# Patient Record
Sex: FEMALE | Race: BLACK OR AFRICAN AMERICAN | NOT HISPANIC OR LATINO | Employment: FULL TIME | URBAN - METROPOLITAN AREA
[De-identification: names, ages, dates, MRNs, and addresses within clinical notes are randomized per-mention and may not be internally consistent; named-entity substitution may affect disease eponyms.]

---

## 2017-01-03 ENCOUNTER — OFFICE VISIT (OUTPATIENT)
Dept: PULMONOLOGY | Facility: CLINIC | Age: 37
End: 2017-01-03
Payer: COMMERCIAL

## 2017-01-03 VITALS
WEIGHT: 156.94 LBS | OXYGEN SATURATION: 99 % | HEIGHT: 67 IN | SYSTOLIC BLOOD PRESSURE: 102 MMHG | RESPIRATION RATE: 18 BRPM | DIASTOLIC BLOOD PRESSURE: 64 MMHG | BODY MASS INDEX: 24.63 KG/M2 | HEART RATE: 79 BPM

## 2017-01-03 DIAGNOSIS — G47.33 OSA (OBSTRUCTIVE SLEEP APNEA): Primary | ICD-10-CM

## 2017-01-03 PROCEDURE — 99999 PR PBB SHADOW E&M-EST. PATIENT-LVL III: CPT | Mod: PBBFAC,,, | Performed by: NURSE PRACTITIONER

## 2017-01-03 PROCEDURE — 99213 OFFICE O/P EST LOW 20 MIN: CPT | Mod: S$GLB,,, | Performed by: NURSE PRACTITIONER

## 2017-01-03 PROCEDURE — 1159F MED LIST DOCD IN RCRD: CPT | Mod: S$GLB,,, | Performed by: NURSE PRACTITIONER

## 2017-01-03 NOTE — PROGRESS NOTES
"Subjective:      Patient ID: Shasta Rivera is a 36 y.o. female.    Chief Complaint: Sleep Apnea    HPI Comments: Patient presents for sleep apnea on autopap. She had problems with mask initially, then URI. She is motivated to wear pap. Breathing better at night with PAP.         Visit Vitals    /64    Pulse 79    Resp 18    Ht 5' 7" (1.702 m)    Wt 71.2 kg (156 lb 15.5 oz)    LMP 12/07/2016 (Approximate)    SpO2 99%    BMI 24.58 kg/m2     Body mass index is 24.58 kg/(m^2).    Review of Systems   Constitutional: Negative.    HENT: Negative.    Respiratory: Negative.    Cardiovascular: Negative.    Musculoskeletal: Negative.    Gastrointestinal: Negative.    Neurological: Negative.    Psychiatric/Behavioral: Negative.      Objective:      Physical Exam   Constitutional: She is oriented to person, place, and time. She appears well-developed and well-nourished.   HENT:   Head: Normocephalic and atraumatic.   Mouth/Throat: Oropharynx is clear and moist.   Neck: Normal range of motion. Neck supple.   Cardiovascular: Normal rate and regular rhythm.  Exam reveals no gallop.    No murmur heard.  Pulmonary/Chest: Effort normal and breath sounds normal.   Abdominal: Soft. She exhibits no mass.   Musculoskeletal: Normal range of motion. She exhibits no edema.   Neurological: She is alert and oriented to person, place, and time.   Skin: Skin is warm and dry.   Psychiatric: She has a normal mood and affect.     Personal Diagnostic Review  Autopap download: Patient wears on average 5 hrs and 10 minutes. Greater than 4 hrs 39 % of the time. 90% percentile pressure 7 with AHI 2.2 events/hr      Assessment:       1. SAMIRA (obstructive sleep apnea)        Outpatient Encounter Prescriptions as of 1/3/2017   Medication Sig Dispense Refill    cetirizine (ZYRTEC) 10 MG tablet TAKE ONE TABLET BY MOUTH ONE TIME DAILY  30 tablet 9    hydrocodone-acetaminophen 10-325mg (NORCO)  mg Tab TAKE 1 TABLET BY MOUTH TWICE A " DAY AS NEEDED FOR 30 DAYS  0    SUDAFED 12 HOUR 120 mg 12 hr tablet TAKE 1 TABLET BY MOUTH TWICE DAILY 60 tablet 5    terbinafine HCl (LAMISIL) 250 mg tablet Take 1 tablet (250 mg total) by mouth once daily. 1 pill by mouth x 90 days. Avoid alcohol intake while taking medication 90 tablet 0    zolpidem (AMBIEN) 5 MG Tab TAKE 1 TO 2 TABLETS BY MOUTH NIGHTLY AT BEDTIME AS NEEDED 30 tablet 3     No facility-administered encounter medications on file as of 1/3/2017.      No orders of the defined types were placed in this encounter.    Plan:      Follow up in 6 weeks to review compliance and symptoms or call earlier if any problems

## 2017-02-09 ENCOUNTER — LAB VISIT (OUTPATIENT)
Dept: LAB | Facility: HOSPITAL | Age: 37
End: 2017-02-09
Attending: PODIATRIST
Payer: COMMERCIAL

## 2017-02-09 ENCOUNTER — OFFICE VISIT (OUTPATIENT)
Dept: PODIATRY | Facility: CLINIC | Age: 37
End: 2017-02-09
Payer: COMMERCIAL

## 2017-02-09 VITALS
BODY MASS INDEX: 23.88 KG/M2 | DIASTOLIC BLOOD PRESSURE: 75 MMHG | HEART RATE: 82 BPM | WEIGHT: 152.13 LBS | SYSTOLIC BLOOD PRESSURE: 112 MMHG | HEIGHT: 67 IN

## 2017-02-09 DIAGNOSIS — B35.1 DERMATOPHYTOSIS OF NAIL: Primary | ICD-10-CM

## 2017-02-09 DIAGNOSIS — B35.1 DERMATOPHYTOSIS OF NAIL: ICD-10-CM

## 2017-02-09 LAB
ALBUMIN SERPL BCP-MCNC: 3.9 G/DL
ALP SERPL-CCNC: 57 U/L
ALT SERPL W/O P-5'-P-CCNC: 12 U/L
AST SERPL-CCNC: 20 U/L
BILIRUB DIRECT SERPL-MCNC: 0.2 MG/DL
BILIRUB SERPL-MCNC: 0.5 MG/DL
PROT SERPL-MCNC: 7.8 G/DL

## 2017-02-09 PROCEDURE — 99213 OFFICE O/P EST LOW 20 MIN: CPT | Mod: S$GLB,,, | Performed by: PODIATRIST

## 2017-02-09 PROCEDURE — 99999 PR PBB SHADOW E&M-EST. PATIENT-LVL III: CPT | Mod: PBBFAC,,, | Performed by: PODIATRIST

## 2017-02-09 NOTE — PROGRESS NOTES
"Podiatry Note  Requesting Consult Provider:   PCP: Dr. Mary Redmond MD    CHIEF COMPLAINT   Chief Complaint   Patient presents with    Follow-up     bilateral hallux fungal nails, pt states fungus improvement with Lamisil treatment         HPI:    Shasta Rivera is a 36 y.o. female presenting to podiatry clinic for fu fungal toenails. Pt states she has started lamisil and has noted improvement in nail discoloration. She is pleased with new results. She has no complaints with taking oral medication.        REVIEW OF SYSTEMS  General: Denies any fever or chills  Chest: Denies shortness of breath, wheezing, coughing, or sputum production  Heart: Denies chest pain, cold extremities, orthopenia, or reduced exercise tolerance  As noted above and per history of current illness above, otherwise negative in the remainder of the 14 systems.      PHYSICAL EXAM:      Vitals:    02/09/17 1015   BP: 112/75   Pulse: 82   Weight: 69 kg (152 lb 1.9 oz)   Height: 5' 7" (1.702 m)       General: This patient is well-developed, well-nourished and appears stated age, well-oriented to person, place and time, and cooperative and pleasant on today's visit      LOWER EXTREMITY  Vascular:   · Palpable DP/PT pulses b/l  · Skin temperature warm to warm from prox to distally  · CFT <5 secs b/l  · There is no edema noted b/l    Dermatologic:   · Nails 1-5 with clean nail plate, no evidence of mycosis   · No erythema or drainage noted b/l  · Webspaces are C/D/I B/L  · There is no hyperkeratotic tissue noted.   · Skin texture and turgor WNL    Neurologic:  · epicritic sensation grossly intact b/l   · Light touch and sharp/dull sensation intact b/l  · Achilles and patellar deep tendon reflexes intact  · Babinski reflex absent b/l    Musculoskeletal/Orthopedic:  · No symptomatic structural abnormalities noted.   · Muscle strength AT/EHL/EDL/PT: 5/5; Achilles/Gastroc/Soleus: 5/5; PB/PL: 5/5 Muscle tone is normal.  · Ankle joint ROM  B/L " supple DF/PF, non-crepitus  · STJ ROM supple inv/ev, non crepitus   · MTPJ b/l supple DF/PF, non crepitus    ASSESSMENT   1. Onychomycosis due to dermatophyte     PLAN    1. Patient was educated about clinical and imaging findings, and verbalizes understanding of above.  2. I Discussed treatment options for nail fungus.   -continue with oral lamisil   3. RTC PRN     Report Electronically Signed By:  Beba Dick DPM   Podiatric Medicine & Surgery  [facility]  2/9/2017

## 2017-02-14 ENCOUNTER — TELEPHONE (OUTPATIENT)
Dept: PULMONOLOGY | Facility: CLINIC | Age: 37
End: 2017-02-14

## 2017-02-15 ENCOUNTER — OFFICE VISIT (OUTPATIENT)
Dept: SLEEP MEDICINE | Facility: CLINIC | Age: 37
End: 2017-02-15
Payer: COMMERCIAL

## 2017-02-15 VITALS
WEIGHT: 151 LBS | SYSTOLIC BLOOD PRESSURE: 102 MMHG | DIASTOLIC BLOOD PRESSURE: 76 MMHG | OXYGEN SATURATION: 99 % | HEART RATE: 88 BPM | HEIGHT: 67 IN | BODY MASS INDEX: 23.7 KG/M2 | RESPIRATION RATE: 18 BRPM

## 2017-02-15 DIAGNOSIS — Z72.820 LACK OF ADEQUATE SLEEP: ICD-10-CM

## 2017-02-15 DIAGNOSIS — G47.33 OSA (OBSTRUCTIVE SLEEP APNEA): Primary | ICD-10-CM

## 2017-02-15 PROCEDURE — 99213 OFFICE O/P EST LOW 20 MIN: CPT | Mod: S$GLB,,, | Performed by: NURSE PRACTITIONER

## 2017-02-15 PROCEDURE — 99999 PR PBB SHADOW E&M-EST. PATIENT-LVL III: CPT | Mod: PBBFAC,,, | Performed by: NURSE PRACTITIONER

## 2017-02-15 NOTE — PROGRESS NOTES
"Subjective:      Patient ID: Shasta Rivera is a 36 y.o. female.    Chief Complaint: Sleep Apnea    HPI Comments: Patient presents to the office today for evaluation of sleep apnea.  She is wondering CPAP nightly, but still has daytime hypersomnolence.  She is sleeping more soundly.  She admits to approximately 5 hours of sleep per night and watching TV in bed.  [place] Sleepiness Scale score 14      Visit Vitals    /76    Pulse 88    Resp 18    Ht 5' 7" (1.702 m)    Wt 68.5 kg (151 lb 0.2 oz)    LMP 02/09/2017    SpO2 99%    BMI 23.65 kg/m2     Body mass index is 23.65 kg/(m^2).    Review of Systems   Constitutional: Positive for fatigue.   HENT: Negative.    Respiratory: Negative.    Cardiovascular: Negative.    Musculoskeletal: Negative.    Gastrointestinal: Negative.    Neurological: Negative.    Psychiatric/Behavioral: Positive for sleep disturbance.     Objective:      Physical Exam   Constitutional: She is oriented to person, place, and time. She appears well-developed and well-nourished.   HENT:   Head: Normocephalic and atraumatic.   Mouth/Throat: Oropharynx is clear and moist.   Neck: Normal range of motion. Neck supple.   Cardiovascular: Exam reveals no gallop.    No murmur heard.  Abdominal: She exhibits no mass. There is no tenderness.   Musculoskeletal: She exhibits no edema.   Neurological: She is alert and oriented to person, place, and time.   Psychiatric: She has a normal mood and affect.     Personal Diagnostic Review  Autopap download: Patient wears on average 5 hrs and 43 minutes. Greater than 4 hrs 93.3 % of the time. 90% percentile pressure 7 with AHI 1.9 events/hr      Assessment:       1. SAMIRA (obstructive sleep apnea)    2. Lack of adequate sleep        Outpatient Encounter Prescriptions as of 2/15/2017   Medication Sig Dispense Refill    cetirizine (ZYRTEC) 10 MG tablet TAKE ONE TABLET BY MOUTH ONE TIME DAILY  30 tablet 9    hydrocodone-acetaminophen 10-325mg (NORCO) "  mg Tab TAKE 1 TABLET BY MOUTH TWICE A DAY AS NEEDED FOR 30 DAYS  0    SUDAFED 12 HOUR 120 mg 12 hr tablet TAKE 1 TABLET BY MOUTH TWICE DAILY 60 tablet 5    terbinafine HCl (LAMISIL) 250 mg tablet Take 1 tablet (250 mg total) by mouth once daily. 1 pill by mouth x 90 days. Avoid alcohol intake while taking medication 90 tablet 0    zolpidem (AMBIEN) 5 MG Tab TAKE 1 TO 2 TABLETS BY MOUTH NIGHTLY AT BEDTIME AS NEEDED 30 tablet 3     No facility-administered encounter medications on file as of 2/15/2017.      No orders of the defined types were placed in this encounter.    Plan:   Doing well on PAP settings. Patient is compliant. Obtain 8hrs of sleep.  Follow up in 6 months with PAP data download or call earlier if any problems.    Total time spent in face to face counseling and coordination of care -  20minutes over 50% of time was used in discussion of prognosis, risks, benefits of treatment, instructions and compliance with regimen .

## 2017-02-27 ENCOUNTER — OFFICE VISIT (OUTPATIENT)
Dept: FAMILY MEDICINE | Facility: CLINIC | Age: 37
End: 2017-02-27
Payer: COMMERCIAL

## 2017-02-27 VITALS
WEIGHT: 151 LBS | HEIGHT: 66 IN | TEMPERATURE: 97 F | OXYGEN SATURATION: 99 % | RESPIRATION RATE: 18 BRPM | HEART RATE: 89 BPM | SYSTOLIC BLOOD PRESSURE: 118 MMHG | BODY MASS INDEX: 24.27 KG/M2 | DIASTOLIC BLOOD PRESSURE: 64 MMHG

## 2017-02-27 DIAGNOSIS — Z12.4 CERVICAL CANCER SCREENING: ICD-10-CM

## 2017-02-27 DIAGNOSIS — D25.9 UTERINE LEIOMYOMA, UNSPECIFIED LOCATION: ICD-10-CM

## 2017-02-27 DIAGNOSIS — J30.89 NON-SEASONAL ALLERGIC RHINITIS DUE TO OTHER ALLERGIC TRIGGER: ICD-10-CM

## 2017-02-27 DIAGNOSIS — Z00.00 PREVENTATIVE HEALTH CARE: Primary | ICD-10-CM

## 2017-02-27 PROCEDURE — 99999 PR PBB SHADOW E&M-EST. PATIENT-LVL III: CPT | Mod: PBBFAC,,, | Performed by: FAMILY MEDICINE

## 2017-02-27 PROCEDURE — 99395 PREV VISIT EST AGE 18-39: CPT | Mod: S$GLB,,, | Performed by: FAMILY MEDICINE

## 2017-02-27 PROCEDURE — 88175 CYTOPATH C/V AUTO FLUID REDO: CPT

## 2017-02-27 RX ORDER — ZOLPIDEM TARTRATE 5 MG/1
TABLET ORAL
Qty: 30 TABLET | Refills: 5 | Status: SHIPPED | OUTPATIENT
Start: 2017-02-27 | End: 2017-08-27 | Stop reason: SDUPTHER

## 2017-02-27 RX ORDER — CETIRIZINE HYDROCHLORIDE 10 MG/1
10 TABLET ORAL DAILY
Qty: 30 TABLET | Refills: 9 | Status: SHIPPED | OUTPATIENT
Start: 2017-02-27 | End: 2017-04-06 | Stop reason: SDUPTHER

## 2017-02-27 RX ORDER — PSEUDOEPHEDRINE HCL 120 MG/1
120 TABLET, FILM COATED, EXTENDED RELEASE ORAL 2 TIMES DAILY
Qty: 60 TABLET | Refills: 5 | Status: SHIPPED | OUTPATIENT
Start: 2017-02-27 | End: 2017-09-24 | Stop reason: SDUPTHER

## 2017-02-27 NOTE — PROGRESS NOTES
CHIEF COMPLAINT: This is a 36-year-old female here for preventive health exam.    SUBJECTIVE: The patient reports that she was diagnosed with mild SAMIRA and has been using CPAP with no improvement.  Patient admits that she does not sleep more than 4-6 hours a night, however.  She is working full-time and going to grad school.  She's gained 8 pounds in the last year.  She is no longer taking OCP.  Her menstrual cycles are regular.  Patient complains of vivid dreams/nightmares with use of 5 mg of Ambien for insomnia.  She often takes half a tablet.  Patient is being treated for onychomycosis with terbinafine.  She requests refill of pseudoephedrine and Zyrtec for allergic rhinitis.    Eye exam 2016. Pap smear October 2014. Tdap September 2012. Flu vaccine October 2016.     ROS:  GENERAL: Patient denies fever, chills, night sweats. Patient denies weight loss. Patient denies anorexia, fatigue, weakness or swollen glands.  SKIN: Patient denies rash or hair loss.  HEENT: Patient denies sore throat, ear pain, hearing loss, nasal congestion, or runny nose. Patient denies visual disturbance, eye irritation or discharge.  LUNGS: Patient denies cough, wheeze or hemoptysis.  CARDIOVASCULAR: Patient denies chest pain, shortness of breath, palpitations, syncope or lower extremity edema.  GI: Patient denies abdominal pain, nausea, vomiting, diarrhea, constipation, blood in stool or melena.  GENITOURINARY: Patient denies pelvic pain, vaginal discharge, itch or odor. Patient denies irregular vaginal bleeding. Patient denies dysuria, frequency, hematuria, nocturia, urgency or incontinence.  BREASTS: Patient denies breast pain, mass or nipple discharge.  MUSCULOSKELETAL: Patient denies joint pain, swelling, redness or warmth.  NEUROLOGIC: Patient denies headache, vertigo, paresthesias, weakness in limb, dysarthria, dysphagia or abnormality of gait.  PSYCHIATRIC: Patient denies anxiety, depression, or memory loss. Positive for  insomnia.     OBJECTIVE:   GENERAL: Well-developed well-nourished black female alert and oriented x3, in no acute distress. Memory, judgment and cognition without deficit. Weight gain of a pounds in the last year.  SKIN: Clear without rash. Normal color and tone.  HEENT: Eyes: Clear conjunctivae. Pupils equal reactive to light and accommodation. Ears: Clear TMs. Clear canals. Nose: Without congestion. Pharynx: Without injection or exudates.  NECK: Supple, normal range of motion. No masses, lymphadenopathy or enlarged thyroid. No JVD. Carotids 2+ and equal. No bruits.  LUNGS: Clear to auscultation. Normal respiratory effort.  CARDIOVASCULAR: Regular rhythm, normal S1, S2 without murmur, gallop or rub.  BACK: No CVA or spinal tenderness.  BREASTS: No masses, tenderness or nipple discharge.  ABDOMEN: Normal appearance. Active bowel sounds. Soft, nontender without mass or organomegaly. No rebound or guarding.  EXTREMITIES: Without cyanosis, clubbing or edema. Distal pulses 2+ and equal. Normal range of motion in all extremities. No joint effusion, erythema or warmth.  NEUROLOGIC: Cranial nerves II through XII without deficit. Motor strength equal bilaterally. Sensation normal to touch. Deep tendon reflexes 2+ and equal. Gait without abnormality. No tremor. Negative cerebellar signs.   PELVIC: External: Without lesions or inflammation.  Vaginal: Clear.  Cervix: Small nabothian cyst, nontender.  Pap X2.  Uterus: Enlarged and retroflexed.  Adnexa: Non-tender, without masses.      ASSESSMENT:  1. Preventative health care    2. Non-seasonal allergic rhinitis due to other allergic trigger    3. Uterine leiomyoma, unspecified location    4. Cervical cancer screening      PLAN:   1.  Weight reduction.  Exercise regularly.  2.  Age-appropriate counseling.  3.  Refill medication.  4.  Follow-up annually.

## 2017-03-27 RX ORDER — TERBINAFINE HYDROCHLORIDE 250 MG/1
TABLET ORAL
Qty: 90 TABLET | Refills: 0 | Status: SHIPPED | OUTPATIENT
Start: 2017-03-27 | End: 2017-04-03 | Stop reason: ALTCHOICE

## 2017-03-29 ENCOUNTER — PATIENT MESSAGE (OUTPATIENT)
Dept: PODIATRY | Facility: CLINIC | Age: 37
End: 2017-03-29

## 2017-04-03 ENCOUNTER — LAB VISIT (OUTPATIENT)
Dept: LAB | Facility: HOSPITAL | Age: 37
End: 2017-04-03
Attending: PODIATRIST
Payer: COMMERCIAL

## 2017-04-03 ENCOUNTER — OFFICE VISIT (OUTPATIENT)
Dept: PODIATRY | Facility: CLINIC | Age: 37
End: 2017-04-03
Payer: COMMERCIAL

## 2017-04-03 ENCOUNTER — TELEPHONE (OUTPATIENT)
Dept: PODIATRY | Facility: CLINIC | Age: 37
End: 2017-04-03

## 2017-04-03 VITALS
DIASTOLIC BLOOD PRESSURE: 69 MMHG | HEART RATE: 97 BPM | BODY MASS INDEX: 23.74 KG/M2 | WEIGHT: 147.69 LBS | HEIGHT: 66 IN | SYSTOLIC BLOOD PRESSURE: 112 MMHG

## 2017-04-03 DIAGNOSIS — B35.1 DERMATOPHYTOSIS OF NAIL: ICD-10-CM

## 2017-04-03 DIAGNOSIS — B35.1 DERMATOPHYTOSIS OF NAIL: Primary | ICD-10-CM

## 2017-04-03 LAB
ALBUMIN SERPL BCP-MCNC: 3.9 G/DL
ALP SERPL-CCNC: 75 U/L
ALT SERPL W/O P-5'-P-CCNC: 16 U/L
AST SERPL-CCNC: 23 U/L
BILIRUB DIRECT SERPL-MCNC: 0.1 MG/DL
BILIRUB SERPL-MCNC: 0.2 MG/DL
PROT SERPL-MCNC: 8 G/DL

## 2017-04-03 PROCEDURE — 36415 COLL VENOUS BLD VENIPUNCTURE: CPT | Mod: PO

## 2017-04-03 PROCEDURE — 99999 PR PBB SHADOW E&M-EST. PATIENT-LVL III: CPT | Mod: PBBFAC,,, | Performed by: PODIATRIST

## 2017-04-03 PROCEDURE — 80076 HEPATIC FUNCTION PANEL: CPT

## 2017-04-03 PROCEDURE — 1160F RVW MEDS BY RX/DR IN RCRD: CPT | Mod: S$GLB,,, | Performed by: PODIATRIST

## 2017-04-03 PROCEDURE — 99213 OFFICE O/P EST LOW 20 MIN: CPT | Mod: S$GLB,,, | Performed by: PODIATRIST

## 2017-04-03 RX ORDER — TERBINAFINE HYDROCHLORIDE 250 MG/1
250 TABLET ORAL DAILY
Qty: 90 TABLET | Refills: 0 | Status: SHIPPED | OUTPATIENT
Start: 2017-04-03 | End: 2018-01-24

## 2017-04-03 NOTE — TELEPHONE ENCOUNTER
----- Message from Judith Burks sent at 4/3/2017 11:37 AM CDT -----  Contact: Carmencita with [facility]  [phone]  Checking on the status of a Prior Auth for Terbinafine requested on 3/27.  Please call ASAP.

## 2017-04-03 NOTE — MR AVS SNAPSHOT
[facility]  [address]  [place] [address]  Phone: [phone]  Fax: [phone]                  Shasta Rivera   4/3/2017 3:00 PM   Office Visit    Description:  Female : 1980   Provider:  Beba Dick DPM   Department:  [facility]           Reason for Visit     Follow-up           Diagnoses this Visit        Comments    Dermatophytosis of nail    -  Primary            To Do List           Future Appointments        Provider Department Dept Phone    4/3/2017 6:45 PM LAB, SAME DAY [facility] [phone]    2017 3:00 PM Beab Dick DPM [facility] [phone]    2017 3:25 PM LABORATORY, [facility] [phone]    2017 8:40 AM Elizabeth Lejeune, NP [facility] [phone]      Goals (5 Years of Data)     None       These Medications        Disp Refills Start End    terbinafine HCl (LAMISIL) 250 mg tablet 90 tablet 0 4/3/2017     Take 1 tablet (250 mg total) by mouth once daily. 1 pill by mouth x 90 days. Avoid alcohol intake while taking medication - Oral    Pharmacy: [place] [id number] [place] [facility]  [facility] #: [phone]         [facility]     [facility] Care Line -  Assistance  Unless otherwise directed by your provider, please contact [facility], our nurse care line that is available for  assistance.     Registered nurses in the [facility] provide: appointment scheduling, clinical advisement, health education, and other advisory services.  Call: [phone] (toll free)               Medications           Message regarding Medications     Verify the changes and/or additions to your medication regime listed below are the same as discussed with your clinician today.  If any of these changes or additions are incorrect, please notify your healthcare provider.        START taking these NEW medications        Refills  "   terbinafine HCl (LAMISIL) 250 mg tablet 0    Sig: Take 1 tablet (250 mg total) by mouth once daily. 1 pill by mouth x 90 days. Avoid alcohol intake while taking medication    Class: Normal    Route: Oral           Verify that the below list of medications is an accurate representation of the medications you are currently taking.  If none reported, the list may be blank. If incorrect, please contact your healthcare provider. Carry this list with you in case of emergency.           Current Medications     cetirizine (ZYRTEC) 10 MG tablet Take 1 tablet (10 mg total) by mouth once daily.    hydrocodone-acetaminophen 10-325mg (NORCO)  mg Tab TAKE 1 TABLET BY MOUTH TWICE A DAY AS NEEDED FOR 30 DAYS    zolpidem (AMBIEN) 5 MG Tab TAKE 1 TO 2 TABLETS BY MOUTH NIGHTLY AT BEDTIME AS NEEDED    pseudoephedrine (SUDAFED 12 HOUR) 120 mg 12 hr tablet Take 1 tablet (120 mg total) by mouth 2 (two) times daily.    terbinafine HCl (LAMISIL) 250 mg tablet Take 1 tablet (250 mg total) by mouth once daily. 1 pill by mouth x 90 days. Avoid alcohol intake while taking medication           Clinical Reference Information           Your Vitals Were     BP Pulse Height Weight BMI    112/69 (BP Location: Right arm, Patient Position: Sitting, BP Method: Automatic) 97 5' 6" (1.676 m) 67 kg (147 lb 11.3 oz) 23.84 kg/m2      Blood Pressure          Most Recent Value    BP  112/69      Allergies as of 4/3/2017     No Known Allergies      Immunizations Administered on Date of Encounter - 4/3/2017     None      Orders Placed During Today's Visit     Future Labs/Procedures Expected by Expires    Hepatic function panel  4/3/2017 6/2/2018    Hepatic function panel  4/3/2017 6/2/2018      Language Assistance Services     ATTENTION: Language assistance services are available, free of charge. Please call [phone].      ATENCIÓN: Si habla hazel, tiene a valdez disposición servicios gratuitos de asistencia lingüística. Llame al " [phone].     JACQUES Ý: N?u b?n nói Ti?ng Vi?t, có các d?ch v? h? tr? ngôn ng? mi?n phí dành cho b?n. G?i s? [phone].         [facility] complies with applicable Federal civil rights laws and does not discriminate on the basis of race, color, national origin, age, disability, or sex.

## 2017-04-03 NOTE — TELEPHONE ENCOUNTER
Returned pharmacy call there was no answer no voicemail option given.  Amie Núñez MA  Office of [facility]   Podiatry Department, [facility]

## 2017-04-03 NOTE — PROGRESS NOTES
"Podiatry Note    CHIEF COMPLAINT   Chief Complaint   Patient presents with    Follow-up     6 wk f/u for fungal nails. Verbalizes relief. Interested in alternative treatment         HPI:    Shasta Rivera is a 37 y.o. female presenting to podiatry clinic for fu fungal toenails. Pt states she has started lamisil and has noted improvement in nail discoloration. She is pleased with new results. She would like to continue therapy as there is a mild amount of residual discoloration b/l hallux.      REVIEW OF SYSTEMS  General: Denies any fever or chills  Chest: Denies shortness of breath, wheezing, coughing, or sputum production  Heart: Denies chest pain, cold extremities, orthopenia, or reduced exercise tolerance  As noted above and per history of current illness above, otherwise negative in the remainder of the 14 systems.      PHYSICAL EXAM:      Vitals:    04/03/17 1510   BP: 112/69   Pulse: 97   Weight: 67 kg (147 lb 11.3 oz)   Height: 5' 6" (1.676 m)   PainSc: 0-No pain       General: This patient is well-developed, well-nourished and appears stated age, well-oriented to person, place and time, and cooperative and pleasant on today's visit      LOWER EXTREMITY  Vascular:   · Palpable DP/PT pulses b/l  · Skin temperature warm to warm from prox to distally  · CFT <5 secs b/l  · There is no edema noted b/l    Dermatologic:   · Nails 1-5 with clean nail plate, distal discolration noted b/l hallux  · No erythema or drainage noted b/l  · Webspaces are C/D/I B/L  · There is no hyperkeratotic tissue noted.   · Skin texture and turgor WNL    Neurologic:  · epicritic sensation grossly intact b/l   · Light touch and sharp/dull sensation intact b/l  · Achilles and patellar deep tendon reflexes intact  · Babinski reflex absent b/l    Musculoskeletal/Orthopedic:  · No symptomatic structural abnormalities noted.   · Muscle strength AT/EHL/EDL/PT: 5/5; Achilles/Gastroc/Soleus: 5/5; PB/PL: 5/5 Muscle tone is normal.  · Ankle " joint ROM  B/L supple DF/PF, non-crepitus  · STJ ROM supple inv/ev, non crepitus   · MTPJ b/l supple DF/PF, non crepitus    ASSESSMENT   1. Onychomycosis due to dermatophyte     PLAN    1. Patient was educated about clinical and imaging findings, and verbalizes understanding of above.  2. I Discussed treatment options for nail fungus.   -continue with oral lamisil   -new LFTs baseline  3. RTC 6 wk follow up    Report Electronically Signed By:  Beba Dick DPM   Podiatric Medicine & Surgery  [facility]  4/3/2017

## 2017-04-05 ENCOUNTER — TELEPHONE (OUTPATIENT)
Dept: PODIATRY | Facility: CLINIC | Age: 37
End: 2017-04-05

## 2017-04-05 NOTE — TELEPHONE ENCOUNTER
Attempted to contact patient in reference to lab results reviewed by Dr. Dick, IAMM being normal and to inform patient that prescription medication is not covered by insurance so she should contact insurance to see what information is needed to cover medication. Unable to leave voicemail.

## 2017-04-06 RX ORDER — CETIRIZINE HYDROCHLORIDE 10 MG/1
10 TABLET ORAL DAILY
Qty: 30 TABLET | Refills: 5 | Status: SHIPPED | OUTPATIENT
Start: 2017-04-06 | End: 2023-05-23

## 2017-04-07 ENCOUNTER — OFFICE VISIT (OUTPATIENT)
Dept: OTOLARYNGOLOGY | Facility: CLINIC | Age: 37
End: 2017-04-07
Payer: COMMERCIAL

## 2017-04-07 VITALS
HEART RATE: 105 BPM | DIASTOLIC BLOOD PRESSURE: 79 MMHG | BODY MASS INDEX: 23.98 KG/M2 | WEIGHT: 148.56 LBS | SYSTOLIC BLOOD PRESSURE: 119 MMHG

## 2017-04-07 DIAGNOSIS — J06.9 UPPER RESPIRATORY TRACT INFECTION, UNSPECIFIED TYPE: Primary | ICD-10-CM

## 2017-04-07 DIAGNOSIS — J30.9 ALLERGIC RHINITIS, UNSPECIFIED ALLERGIC RHINITIS TRIGGER, UNSPECIFIED RHINITIS SEASONALITY: ICD-10-CM

## 2017-04-07 PROCEDURE — 1160F RVW MEDS BY RX/DR IN RCRD: CPT | Mod: S$GLB,,, | Performed by: PHYSICIAN ASSISTANT

## 2017-04-07 PROCEDURE — 99999 PR PBB SHADOW E&M-EST. PATIENT-LVL III: CPT | Mod: PBBFAC,,, | Performed by: PHYSICIAN ASSISTANT

## 2017-04-07 PROCEDURE — 99204 OFFICE O/P NEW MOD 45 MIN: CPT | Mod: S$GLB,,, | Performed by: PHYSICIAN ASSISTANT

## 2017-04-07 RX ORDER — FLUTICASONE PROPIONATE 50 MCG
2 SPRAY, SUSPENSION (ML) NASAL DAILY
Qty: 1 BOTTLE | Refills: 12 | Status: SHIPPED | OUTPATIENT
Start: 2017-04-07 | End: 2020-05-04

## 2017-04-07 RX ORDER — AMOXICILLIN AND CLAVULANATE POTASSIUM 875; 125 MG/1; MG/1
1 TABLET, FILM COATED ORAL 2 TIMES DAILY
Qty: 20 TABLET | Refills: 0 | Status: SHIPPED | OUTPATIENT
Start: 2017-04-07 | End: 2017-04-17

## 2017-04-07 RX ORDER — BENZONATATE 100 MG/1
100 CAPSULE ORAL 3 TIMES DAILY PRN
Qty: 20 CAPSULE | Refills: 0 | Status: SHIPPED | OUTPATIENT
Start: 2017-04-07 | End: 2017-04-17

## 2017-04-07 NOTE — PROGRESS NOTES
Subjective:       Patient ID: Shasta Rivera is a 37 y.o. female.    Chief Complaint: Sinus Problem and Cough    HPI Comments: Patient is a very pleasant 37 year old female here to see me today for the first time for evaluation of sinus issues.  She has history of allergic rhinitis and typically takes Zyrtec and Sudafed with good control.  She's had worsening nasal congestion, thick yellow nasal drainage and postnasal drip for the past 10 days.  She had low-grade fever 3 days ago.  She's coughing up thick discolored mucus; seems worse at night.  She says her chest is sore from coughing so much.  She has sore throat but denies dysphagia.  Denies ear pain or drainage.  She is not currently using any nasal sprays.    Review of Systems   Constitutional: Positive for fever (low-grade). Negative for activity change, appetite change and unexpected weight change.   HENT: Positive for congestion, postnasal drip, rhinorrhea and sore throat. Negative for ear discharge, ear pain, hearing loss, nosebleeds, sinus pressure, tinnitus and trouble swallowing.    Eyes: Negative for discharge and itching.   Respiratory: Positive for cough. Negative for shortness of breath and wheezing.         Has SAMIRA and wears CPAP   Cardiovascular: Negative for chest pain and palpitations.   Gastrointestinal: Negative for diarrhea, nausea and vomiting.   Musculoskeletal: Positive for back pain. Negative for arthralgias and neck pain.   Allergic/Immunologic: Positive for environmental allergies. Negative for food allergies.   Neurological: Positive for light-headedness and headaches. Negative for dizziness.   Hematological: Negative for adenopathy.   Psychiatric/Behavioral: Positive for sleep disturbance (up at night with cough).       Objective:      Physical Exam   Constitutional: She is oriented to person, place, and time. She appears well-developed and well-nourished. No distress.   HENT:   Head: Normocephalic and atraumatic.   Right Ear:  Hearing, tympanic membrane, external ear and ear canal normal. No middle ear effusion.   Left Ear: Hearing, tympanic membrane, external ear and ear canal normal.  No middle ear effusion.   Nose: Mucosal edema and rhinorrhea present. No nasal deformity or septal deviation. No epistaxis. Right sinus exhibits no maxillary sinus tenderness and no frontal sinus tenderness. Left sinus exhibits no maxillary sinus tenderness and no frontal sinus tenderness.   Mouth/Throat: Uvula is midline, oropharynx is clear and moist and mucous membranes are normal. Mucous membranes are not pale and not dry. No trismus in the jaw. Normal dentition. No oropharyngeal exudate or posterior oropharyngeal erythema. Tonsils are 2+ on the right. Tonsils are 2+ on the left. No tonsillar exudate.   Eyes: Conjunctivae, EOM and lids are normal. Pupils are equal, round, and reactive to light. Right eye exhibits no chemosis. Left eye exhibits no chemosis. Right conjunctiva is not injected. Left conjunctiva is not injected. No scleral icterus. Right eye exhibits normal extraocular motion and no nystagmus. Left eye exhibits normal extraocular motion and no nystagmus.   Neck: Trachea normal and phonation normal. No tracheal tenderness present. No tracheal deviation present. No thyroid mass and no thyromegaly present.   Cardiovascular: Intact distal pulses.    Pulmonary/Chest: Effort normal. No stridor. No respiratory distress.   Abdominal: She exhibits no distension.   Lymphadenopathy:        Head (right side): No submental, no submandibular, no preauricular, no posterior auricular and no occipital adenopathy present.        Head (left side): No submental, no submandibular, no preauricular, no posterior auricular and no occipital adenopathy present.     She has no cervical adenopathy.   Neurological: She is alert and oriented to person, place, and time. No cranial nerve deficit.   Skin: Skin is warm and dry. No rash noted. No erythema.   Psychiatric: She  has a normal mood and affect. Her behavior is normal.       Assessment:       1. Upper respiratory tract infection, unspecified type    2. Allergic rhinitis, unspecified allergic rhinitis trigger, unspecified rhinitis seasonality        Plan:         She's now had 10 days of symptoms and is worsening.  Will treat with Augmentin x 10 days.  Recommend Flonase.   The patient was given a prescription for a steroid nasal spray, and we discussed in detail the proper mechanism of use directing the spray away from the nasal septum.  In addition, we also discussed that it will take two to three weeks of daily use to achieve maximal effectiveness.  Also discussed using frequent nasal saline spray; plain Mucinex, and good hydration.  She requested something for cough; will add Tessalon as needed.  Return to clinic only as needed.

## 2017-04-14 ENCOUNTER — PATIENT MESSAGE (OUTPATIENT)
Dept: PODIATRY | Facility: CLINIC | Age: 37
End: 2017-04-14

## 2017-05-31 ENCOUNTER — OFFICE VISIT (OUTPATIENT)
Dept: PODIATRY | Facility: CLINIC | Age: 37
End: 2017-05-31
Payer: COMMERCIAL

## 2017-05-31 ENCOUNTER — LAB VISIT (OUTPATIENT)
Dept: LAB | Facility: HOSPITAL | Age: 37
End: 2017-05-31
Attending: PODIATRIST
Payer: COMMERCIAL

## 2017-05-31 VITALS
HEART RATE: 75 BPM | DIASTOLIC BLOOD PRESSURE: 71 MMHG | SYSTOLIC BLOOD PRESSURE: 106 MMHG | HEIGHT: 66 IN | BODY MASS INDEX: 23.88 KG/M2 | WEIGHT: 148.56 LBS

## 2017-05-31 DIAGNOSIS — B35.1 DERMATOPHYTOSIS OF NAIL: Primary | ICD-10-CM

## 2017-05-31 DIAGNOSIS — B35.1 DERMATOPHYTOSIS OF NAIL: ICD-10-CM

## 2017-05-31 LAB
ALBUMIN SERPL BCP-MCNC: 3.9 G/DL
ALP SERPL-CCNC: 62 U/L
ALT SERPL W/O P-5'-P-CCNC: 15 U/L
AST SERPL-CCNC: 22 U/L
BILIRUB DIRECT SERPL-MCNC: 0.1 MG/DL
BILIRUB SERPL-MCNC: 0.3 MG/DL
PROT SERPL-MCNC: 7.9 G/DL

## 2017-05-31 PROCEDURE — 80076 HEPATIC FUNCTION PANEL: CPT

## 2017-05-31 PROCEDURE — 99999 PR PBB SHADOW E&M-EST. PATIENT-LVL III: CPT | Mod: PBBFAC,,, | Performed by: PODIATRIST

## 2017-05-31 PROCEDURE — 99213 OFFICE O/P EST LOW 20 MIN: CPT | Mod: S$GLB,,, | Performed by: PODIATRIST

## 2017-05-31 PROCEDURE — 36415 COLL VENOUS BLD VENIPUNCTURE: CPT | Mod: PO

## 2017-05-31 NOTE — PROGRESS NOTES
"Podiatry Note    CHIEF COMPLAINT   Chief Complaint   Patient presents with    Follow-up     6 wk f/u fungal nails         HPI:    Shasta Rivera is a 37 y.o. female presenting to podiatry clinic for fu fungal toenails. Pt states she is scheduled for LFT today. She notes improvement with nail since initiation of lamisil. No further complaints.    REVIEW OF SYSTEMS  General: Denies any fever or chills  Chest: Denies shortness of breath, wheezing, coughing, or sputum production  Heart: Denies chest pain, cold extremities, orthopenia, or reduced exercise tolerance  As noted above and per history of current illness above, otherwise negative in the remainder of the 14 systems.      PHYSICAL EXAM:      Vitals:    05/31/17 1523   BP: 106/71   Pulse: 75   Weight: 67.4 kg (148 lb 9.4 oz)   Height: 5' 6" (1.676 m)       General: This patient is well-developed, well-nourished and appears stated age, well-oriented to person, place and time, and cooperative and pleasant on today's visit      LOWER EXTREMITY  Vascular:   · Palpable DP/PT pulses b/l  · Skin temperature warm to warm from prox to distally  · CFT <5 secs b/l  · There is no edema noted b/l    Dermatologic:   · Nails 1-5 with clean nail plate, distal discolration noted b/l hallux MILD  · No erythema or drainage noted b/l  · Webspaces are C/D/I B/L  · There is no hyperkeratotic tissue noted.   · Skin texture and turgor WNL    Neurologic:  · epicritic sensation grossly intact b/l   · Light touch and sharp/dull sensation intact b/l  · Achilles and patellar deep tendon reflexes intact  · Babinski reflex absent b/l    Musculoskeletal/Orthopedic:  · No symptomatic structural abnormalities noted.   · Muscle strength AT/EHL/EDL/PT: 5/5; Achilles/Gastroc/Soleus: 5/5; PB/PL: 5/5 Muscle tone is normal.  · Ankle joint ROM  B/L supple DF/PF, non-crepitus  · STJ ROM supple inv/ev, non crepitus   · MTPJ b/l supple DF/PF, non crepitus    ASSESSMENT   1. Onychomycosis due to " dermatophyte     PLAN    1. Patient was educated about clinical and imaging findings, and verbalizes understanding of above.  2. I Discussed treatment options for nail fungus.   -continue with oral lamisil   -new LFTs baseline today    Report Electronically Signed By:  Beba Dick DPM   Podiatric Medicine & Surgery  [facility]  5/31/2017

## 2017-07-06 ENCOUNTER — OFFICE VISIT (OUTPATIENT)
Dept: FAMILY MEDICINE | Facility: CLINIC | Age: 37
End: 2017-07-06
Payer: COMMERCIAL

## 2017-07-06 VITALS
HEIGHT: 66 IN | WEIGHT: 146.81 LBS | DIASTOLIC BLOOD PRESSURE: 74 MMHG | HEART RATE: 80 BPM | TEMPERATURE: 99 F | BODY MASS INDEX: 23.59 KG/M2 | SYSTOLIC BLOOD PRESSURE: 120 MMHG

## 2017-07-06 DIAGNOSIS — H57.13 EYE DISCOMFORT, BILATERAL: ICD-10-CM

## 2017-07-06 DIAGNOSIS — B34.9 ACUTE VIRAL SYNDROME: Primary | ICD-10-CM

## 2017-07-06 DIAGNOSIS — R50.9 FEVER, UNSPECIFIED FEVER CAUSE: ICD-10-CM

## 2017-07-06 PROCEDURE — 99999 PR PBB SHADOW E&M-EST. PATIENT-LVL III: CPT | Mod: PBBFAC,,, | Performed by: FAMILY MEDICINE

## 2017-07-06 PROCEDURE — 99213 OFFICE O/P EST LOW 20 MIN: CPT | Mod: S$GLB,,, | Performed by: FAMILY MEDICINE

## 2017-07-06 NOTE — PROGRESS NOTES
Subjective:      Patient ID: Shasta Rivera is a 37 y.o. female.    Chief Complaint: Fever and Other (eyes sore feeling)      Past Medical History:   Diagnosis Date    Abnormal cervical Pap smear with positive HPV DNA test     Allergic rhinitis     Onychomycosis     SAMIRA on CPAP     Uterine fibroid      History reviewed. No pertinent surgical history.  Family History   Problem Relation Age of Onset    Hypertension Mother     Hyperlipidemia Mother     Stroke Maternal Grandfather     Hyperlipidemia Father     Diabetes Paternal Grandmother     Hypertension Paternal Grandmother     Cancer Maternal Grandmother      Social History     Social History    Marital status:      Spouse name: N/A    Number of children: N/A    Years of education: N/A     Occupational History     [employer]     Social History Main Topics    Smoking status: Never Smoker    Smokeless tobacco: Never Used    Alcohol use 0.6 oz/week     1 Glasses of wine per week    Drug use: No    Sexual activity: No     Other Topics Concern    Not on file     Social History Narrative    The patient is  and works as a .       Current Outpatient Prescriptions:     cetirizine (ZYRTEC) 10 MG tablet, Take 1 tablet (10 mg total) by mouth once daily., Disp: 30 tablet, Rfl: 5    hydrocodone-acetaminophen 10-325mg (NORCO)  mg Tab, TAKE 1 TABLET BY MOUTH TWICE A DAY AS NEEDED FOR 30 DAYS, Disp: , Rfl: 0    pseudoephedrine (SUDAFED 12 HOUR) 120 mg 12 hr tablet, Take 1 tablet (120 mg total) by mouth 2 (two) times daily., Disp: 60 tablet, Rfl: 5    terbinafine HCl (LAMISIL) 250 mg tablet, Take 1 tablet (250 mg total) by mouth once daily. 1 pill by mouth x 90 days. Avoid alcohol intake while taking medication, Disp: 90 tablet, Rfl: 0    zolpidem (AMBIEN) 5 MG Tab, TAKE 1 TO 2 TABLETS BY MOUTH NIGHTLY AT BEDTIME AS NEEDED, Disp: 30 tablet, Rfl: 5    fluticasone (FLONASE) 50 mcg/actuation nasal spray, 2 sprays by Each Nare  "route once daily., Disp: 1 Bottle, Rfl: 12  Review of patient's allergies indicates:  No Known Allergies    Review of Systems   Constitutional: Negative for chills and fever.   HENT: Negative for ear pain and sinus pressure.    Eyes: Positive for photophobia. Negative for discharge, redness, itching and visual disturbance.   Respiratory: Negative for cough and shortness of breath.    Cardiovascular: Negative for chest pain and palpitations.   Gastrointestinal: Negative for abdominal pain and nausea.   Musculoskeletal: Positive for arthralgias and myalgias.   Neurological: Positive for light-headedness. Negative for speech difficulty and weakness.     HPI   Monday started with fever of 101.  She has had intermittent fevers up until yesterday.  No fevers today. Other symptoms have been achiness, malaise, eye sensitivity.  Eyes sensitive to light and extreme movement on right or left.  This symptoms is nearly gone.  She has also had skin sensitivity which is improving.  Went back to work today.      Objective:   /74   Pulse 80   Temp 98.9 °F (37.2 °C) (Tympanic)   Ht 5' 6" (1.676 m)   Wt 66.6 kg (146 lb 13.2 oz)   BMI 23.70 kg/m²      Physical Exam   Constitutional: She is oriented to person, place, and time. She is cooperative. No distress.   HENT:   Right Ear: Hearing, tympanic membrane, external ear and ear canal normal.   Left Ear: Hearing, tympanic membrane, external ear and ear canal normal.   Mouth/Throat: Uvula is midline, oropharynx is clear and moist and mucous membranes are normal.   Eyes: Conjunctivae, EOM and lids are normal. Pupils are equal, round, and reactive to light.   Cardiovascular: Normal rate, regular rhythm and normal heart sounds.    Pulmonary/Chest: Effort normal and breath sounds normal.   Abdominal: Soft. There is no tenderness. There is no rebound and no guarding.   Musculoskeletal: She exhibits no edema.   Neurological: She is alert and oriented to person, place, and time. No " cranial nerve deficit. Coordination and gait normal.   Skin: Skin is warm, dry and intact. No rash noted. She is not diaphoretic. No erythema.   Psychiatric: She has a normal mood and affect. Her speech is normal and behavior is normal.   Nursing note and vitals reviewed.          Assessment:       1. Acute viral syndrome    2. Fever, unspecified fever cause    3. Eye discomfort, bilateral            Plan:         Acute viral syndrome  Comments:  Getting better at this time.  Call if not continuing to improve or worsens.    Fever, unspecified fever cause  Comments:  Last fever yeterday - started Monday.  Today afebrile.    Eye discomfort, bilateral  Comments:  Eye disomfort over farhana past week with virus, but improved today.  RTC if persistent or worsening.        Patient Care Team:  Mary Redmond MD as PCP - General (Family Medicine)

## 2017-07-06 NOTE — PATIENT INSTRUCTIONS
"  Viral Syndrome (Adult)  A viral illness may cause a number of symptoms. The symptoms depend on the part of the body that the virus affects. If it settles in your nose, throat, and lungs, it may cause cough, sore throat, congestion, and sometimes headache. If it settles in your stomach and intestinal tract, it may cause vomiting and diarrhea. Sometimes it causes vague symptoms like "aching all over," feeling tired, loss of appetite, or fever.  A viral illness usually lasts 1 to 2 weeks, but sometimes it lasts longer. In some cases, a more serious infection can look like a viral syndrome in the first few days of the illness. You may need another exam and additional tests to know the difference. Watch for the warning signs listed below.  Home care  Follow these guidelines for taking care of yourself at home:  · If symptoms are severe, rest at home for the first 2 to 3 days.  · Stay away from cigarette smoke - both your smoke and the smoke from others.  · You may use over-the-counter acetaminophen or ibuprofen for fever, muscle aching, and headache, unless another medicine was prescribed for this. If you have chronic liver or kidney disease or ever had a stomach ulcer or GI bleeding, talk with your doctor before using these medicines. No one who is younger than 18 and ill with a fever should take aspirin. It may cause severe disease or death.  · Your appetite may be poor, so a light diet is fine. Avoid dehydration by drinking 8 to 12 8-ounce glasses of fluids each day. This may include water; orange juice; lemonade; apple, grape, and cranberry juice; clear fruit drinks; electrolyte replacement and sports drinks; and decaffeinated teas and coffee. If you have been diagnosed with a kidney disease, ask your doctor how much and what types of fluids you should drink to prevent dehydration. If you have kidney disease, drinking too much fluid can cause it build up in the your body and be dangerous to your " health.  · Over-the-counter remedies won't shorten the length of the illness but may be helpful for cough, sore throat; and nasal and sinus congestion. Don't use decongestants if you have high blood pressure.  Follow-up care  Follow up with your healthcare provider if you do not improve over the next week.  Call 911  Get emergency medical care if any of the following occur:  · Convulsion  · Feeling weak, dizzy, or like you are going to faint  · Chest pain, shortness of breath, wheezing, or difficulty breathing  When to seek medical advice  Call your healthcare provider right away if any of these occur:  · Cough with lots of colored sputum (mucus) or blood in your sputum  · Chest pain, shortness of breath, wheezing, or difficulty breathing  · Severe headache; face, neck, or ear pain  · Severe, constant pain in the lower right side of your belly (abdominal)  · Continued vomiting (cant keep liquids down)  · Frequent diarrhea (more than 5 times a day); blood (red or black color) or mucus in diarrhea  · Feeling weak, dizzy, or like you are going to faint  · Extreme thirst  · Fever of 100.4°F (38°C) or higher, or as directed by your healthcare provider  Date Last Reviewed: 9/25/2015  © 7218-3414 [employer]. [address] [place] [address], [place] [address]. All rights reserved. This information is not intended as a substitute for professional medical care. Always follow your healthcare professional's instructions.

## 2017-07-18 ENCOUNTER — OFFICE VISIT (OUTPATIENT)
Dept: SLEEP MEDICINE | Facility: CLINIC | Age: 37
End: 2017-07-18
Payer: COMMERCIAL

## 2017-07-18 VITALS
RESPIRATION RATE: 18 BRPM | OXYGEN SATURATION: 99 % | WEIGHT: 148.81 LBS | HEART RATE: 89 BPM | HEIGHT: 66 IN | SYSTOLIC BLOOD PRESSURE: 120 MMHG | DIASTOLIC BLOOD PRESSURE: 70 MMHG | BODY MASS INDEX: 23.92 KG/M2

## 2017-07-18 DIAGNOSIS — Z72.820 LACK OF ADEQUATE SLEEP: ICD-10-CM

## 2017-07-18 DIAGNOSIS — G47.33 OSA (OBSTRUCTIVE SLEEP APNEA): Primary | ICD-10-CM

## 2017-07-18 PROCEDURE — 99999 PR PBB SHADOW E&M-EST. PATIENT-LVL III: CPT | Mod: PBBFAC,,, | Performed by: NURSE PRACTITIONER

## 2017-07-18 PROCEDURE — 99213 OFFICE O/P EST LOW 20 MIN: CPT | Mod: S$GLB,,, | Performed by: NURSE PRACTITIONER

## 2017-07-18 NOTE — PROGRESS NOTES
"Subjective:      Patient ID: Shasta Rivera is a 37 y.o. female.    Chief Complaint: Sleep Apnea    Presents to office for review of AutoPAP therapy. Patient states improved symptoms with use of AutoPAP. Sleeping more soundly. Waking up feeling more refreshed. Improved daytime sleepiness. Patient states she is benefiting from use of the AutoPAP. She has not worn on some days due to viral illness. Not obtaining adequate sleep.  Patient Active Problem List:     Abnormal cervical Pap smear with positive HPV DNA test     Allergic rhinitis     Uterine fibroid            /70 (BP Location: Right arm, Patient Position: Sitting, BP Method: Manual)   Pulse 89   Resp 18   Ht 5' 6" (1.676 m)   Wt 67.5 kg (148 lb 13 oz)   SpO2 99%   BMI 24.02 kg/m²   Body mass index is 24.02 kg/m².    Review of Systems   Constitutional: Negative.    HENT: Negative.    Respiratory: Negative.    Cardiovascular: Negative.    Musculoskeletal: Negative.    Gastrointestinal: Negative.    Neurological: Negative.    Psychiatric/Behavioral: Negative.      Objective:      Physical Exam   Constitutional: She is oriented to person, place, and time. She appears well-developed and well-nourished.   HENT:   Head: Normocephalic and atraumatic.   Mouth/Throat: Oropharynx is clear and moist.   Neck: Normal range of motion. Neck supple.   Cardiovascular: Normal rate and regular rhythm.  Exam reveals no gallop.    No murmur heard.  Abdominal: Soft. She exhibits no mass. There is no tenderness.   Musculoskeletal: Normal range of motion. She exhibits no edema.   Neurological: She is alert and oriented to person, place, and time.   Skin: Skin is warm and dry.   Psychiatric: She has a normal mood and affect.     Personal Diagnostic Review  Autopap download: Patient wears on average 5 hrs and 25 minutes. Greater than 4 hrs 53 % of the time. 90% percentile pressure 6 with AHI 1.5 events/hr      Assessment:       1. SAMIRA (obstructive sleep apnea)    2. " Lack of adequate sleep        Outpatient Encounter Prescriptions as of 7/18/2017   Medication Sig Dispense Refill    cetirizine (ZYRTEC) 10 MG tablet Take 1 tablet (10 mg total) by mouth once daily. 30 tablet 5    fluticasone (FLONASE) 50 mcg/actuation nasal spray 2 sprays by Each Nare route once daily. 1 Bottle 12    hydrocodone-acetaminophen 10-325mg (NORCO)  mg Tab TAKE 1 TABLET BY MOUTH TWICE A DAY AS NEEDED FOR 30 DAYS  0    pseudoephedrine (SUDAFED 12 HOUR) 120 mg 12 hr tablet Take 1 tablet (120 mg total) by mouth 2 (two) times daily. 60 tablet 5    terbinafine HCl (LAMISIL) 250 mg tablet Take 1 tablet (250 mg total) by mouth once daily. 1 pill by mouth x 90 days. Avoid alcohol intake while taking medication 90 tablet 0    zolpidem (AMBIEN) 5 MG Tab TAKE 1 TO 2 TABLETS BY MOUTH NIGHTLY AT BEDTIME AS NEEDED 30 tablet 5     No facility-administered encounter medications on file as of 7/18/2017.      No orders of the defined types were placed in this encounter.    Plan:   Increase sleep time.   Doing well on PAP settings. Follow up in 12 months with PAP data download or call earlier if any problems.

## 2017-08-05 ENCOUNTER — PATIENT MESSAGE (OUTPATIENT)
Dept: PODIATRY | Facility: CLINIC | Age: 37
End: 2017-08-05

## 2017-08-29 RX ORDER — ZOLPIDEM TARTRATE 5 MG/1
TABLET ORAL
Qty: 30 TABLET | Refills: 5 | Status: SHIPPED | OUTPATIENT
Start: 2017-08-29 | End: 2018-01-30 | Stop reason: SDUPTHER

## 2017-09-25 RX ORDER — PSEUDOEPHEDRINE HYDROCHLORIDE 120 MG/1
120 TABLET, FILM COATED, EXTENDED RELEASE ORAL 2 TIMES DAILY
Qty: 60 TABLET | Refills: 5 | Status: SHIPPED | OUTPATIENT
Start: 2017-09-25 | End: 2018-04-28 | Stop reason: SDUPTHER

## 2018-01-24 ENCOUNTER — OFFICE VISIT (OUTPATIENT)
Dept: INTERNAL MEDICINE | Facility: CLINIC | Age: 38
End: 2018-01-24
Payer: COMMERCIAL

## 2018-01-24 VITALS
HEART RATE: 93 BPM | BODY MASS INDEX: 23.1 KG/M2 | DIASTOLIC BLOOD PRESSURE: 84 MMHG | TEMPERATURE: 97 F | SYSTOLIC BLOOD PRESSURE: 126 MMHG | HEIGHT: 66 IN | WEIGHT: 143.75 LBS

## 2018-01-24 DIAGNOSIS — Z20.828 EXPOSURE TO THE FLU: ICD-10-CM

## 2018-01-24 DIAGNOSIS — R68.89 FLU-LIKE SYMPTOMS: Primary | ICD-10-CM

## 2018-01-24 LAB
FLUAV AG SPEC QL IA: NEGATIVE
FLUBV AG SPEC QL IA: NEGATIVE
SPECIMEN SOURCE: NORMAL

## 2018-01-24 PROCEDURE — 87400 INFLUENZA A/B EACH AG IA: CPT | Mod: PO

## 2018-01-24 PROCEDURE — 99213 OFFICE O/P EST LOW 20 MIN: CPT | Mod: SA,S$GLB,, | Performed by: NURSE PRACTITIONER

## 2018-01-24 PROCEDURE — 99999 PR PBB SHADOW E&M-EST. PATIENT-LVL III: CPT | Mod: PBBFAC,,, | Performed by: NURSE PRACTITIONER

## 2018-01-24 NOTE — PROGRESS NOTES
Subjective:       Patient ID: Shasta Rivera is a 37 y.o. female.    Chief Complaint: Nasal Congestion; Cough; and Sore Throat    Pt presents with above symptoms that began last week but appear to be lingering. She reports multiple co-workers with flu. She wants to be sure she does not have the flu. She is taking theraflu, zyrtec, and sudafed with some relief.       Review of Systems   Constitutional: Positive for fatigue. Negative for chills and fever.   HENT: Positive for congestion, postnasal drip, rhinorrhea and sore throat. Negative for ear pain, sinus pressure and sneezing.    Eyes: Negative for photophobia, pain and visual disturbance.   Respiratory: Positive for cough. Negative for chest tightness and shortness of breath.    Cardiovascular: Negative for chest pain, palpitations and leg swelling.   Gastrointestinal: Negative for abdominal pain, constipation, diarrhea, nausea and vomiting.   Genitourinary: Negative for dysuria and frequency.   Musculoskeletal: Negative for arthralgias.   Neurological: Negative for dizziness, light-headedness and headaches.   Psychiatric/Behavioral: Negative for sleep disturbance.       Objective:      Physical Exam   Constitutional: She is oriented to person, place, and time. She appears well-developed and well-nourished.   HENT:   Head: Normocephalic and atraumatic.   Right Ear: Tympanic membrane normal.   Left Ear: Tympanic membrane normal.   Nose: Mucosal edema (mild ) present.   Mouth/Throat: Uvula is midline, oropharynx is clear and moist and mucous membranes are normal.   Eyes: Conjunctivae and EOM are normal.   Neck: Normal range of motion. Neck supple.   Cardiovascular: Normal rate, regular rhythm and normal heart sounds.    Pulmonary/Chest: Effort normal and breath sounds normal.   Musculoskeletal: Normal range of motion.   Neurological: She is alert and oriented to person, place, and time.   Skin: Skin is warm and dry.   Psychiatric: She has a normal mood and  affect.       Assessment:       1. Flu-like symptoms    2. Exposure to the flu        Plan:   Flu-like symptoms  -     Influenza antigen Nasopharyngeal Swab; Future; Expected date: 01/24/2018    Exposure to the flu      As above  Will call with results and further recommendations  Continue supportive care

## 2018-01-25 RX ORDER — OSELTAMIVIR PHOSPHATE 75 MG/1
75 CAPSULE ORAL DAILY
Qty: 10 CAPSULE | Refills: 0 | Status: SHIPPED | OUTPATIENT
Start: 2018-01-25 | End: 2018-01-30

## 2018-01-30 ENCOUNTER — OFFICE VISIT (OUTPATIENT)
Dept: FAMILY MEDICINE | Facility: CLINIC | Age: 38
End: 2018-01-30
Payer: COMMERCIAL

## 2018-01-30 VITALS
SYSTOLIC BLOOD PRESSURE: 118 MMHG | OXYGEN SATURATION: 98 % | DIASTOLIC BLOOD PRESSURE: 76 MMHG | BODY MASS INDEX: 22.46 KG/M2 | HEIGHT: 66 IN | HEART RATE: 122 BPM | RESPIRATION RATE: 18 BRPM | TEMPERATURE: 98 F | WEIGHT: 139.75 LBS

## 2018-01-30 DIAGNOSIS — Z00.00 PREVENTATIVE HEALTH CARE: Primary | ICD-10-CM

## 2018-01-30 DIAGNOSIS — J30.9 CHRONIC ALLERGIC RHINITIS, UNSPECIFIED SEASONALITY, UNSPECIFIED TRIGGER: ICD-10-CM

## 2018-01-30 DIAGNOSIS — F51.01 PRIMARY INSOMNIA: ICD-10-CM

## 2018-01-30 DIAGNOSIS — R87.618 ABNORMAL PAPANICOLAOU SMEAR OF CERVIX WITH POSITIVE HUMAN PAPILLOMA VIRUS (HPV) TEST: ICD-10-CM

## 2018-01-30 PROCEDURE — 99395 PREV VISIT EST AGE 18-39: CPT | Mod: S$GLB,,, | Performed by: FAMILY MEDICINE

## 2018-01-30 PROCEDURE — 99999 PR PBB SHADOW E&M-EST. PATIENT-LVL III: CPT | Mod: PBBFAC,,, | Performed by: FAMILY MEDICINE

## 2018-01-30 RX ORDER — ZOLPIDEM TARTRATE 5 MG/1
TABLET ORAL
Qty: 30 TABLET | Refills: 5 | Status: SHIPPED | OUTPATIENT
Start: 2018-01-30 | End: 2018-08-27 | Stop reason: SDUPTHER

## 2018-03-02 ENCOUNTER — PATIENT MESSAGE (OUTPATIENT)
Dept: FAMILY MEDICINE | Facility: CLINIC | Age: 38
End: 2018-03-02

## 2018-03-02 RX ORDER — ALPRAZOLAM 0.25 MG/1
.25-.5 TABLET ORAL 3 TIMES DAILY PRN
Qty: 30 TABLET | Refills: 0 | Status: SHIPPED | OUTPATIENT
Start: 2018-03-02 | End: 2018-04-26 | Stop reason: SDUPTHER

## 2018-04-27 RX ORDER — ALPRAZOLAM 0.25 MG/1
.25-.5 TABLET ORAL 3 TIMES DAILY PRN
Qty: 30 TABLET | Refills: 2 | Status: SHIPPED | OUTPATIENT
Start: 2018-04-27 | End: 2019-01-05 | Stop reason: SDUPTHER

## 2018-04-30 RX ORDER — PSEUDOEPHEDRINE HYDROCHLORIDE 120 MG/1
120 TABLET, FILM COATED, EXTENDED RELEASE ORAL 2 TIMES DAILY
Qty: 60 TABLET | Refills: 3 | Status: SHIPPED | OUTPATIENT
Start: 2018-04-30 | End: 2019-01-25 | Stop reason: SDUPTHER

## 2018-07-19 ENCOUNTER — OFFICE VISIT (OUTPATIENT)
Dept: INTERNAL MEDICINE | Facility: CLINIC | Age: 38
End: 2018-07-19
Payer: COMMERCIAL

## 2018-07-19 VITALS
WEIGHT: 147.94 LBS | BODY MASS INDEX: 23.77 KG/M2 | DIASTOLIC BLOOD PRESSURE: 78 MMHG | TEMPERATURE: 98 F | HEIGHT: 66 IN | OXYGEN SATURATION: 99 % | HEART RATE: 81 BPM | SYSTOLIC BLOOD PRESSURE: 128 MMHG

## 2018-07-19 DIAGNOSIS — J30.9 CHRONIC ALLERGIC RHINITIS: Primary | ICD-10-CM

## 2018-07-19 DIAGNOSIS — J01.10 ACUTE NON-RECURRENT FRONTAL SINUSITIS: ICD-10-CM

## 2018-07-19 PROCEDURE — 99999 PR PBB SHADOW E&M-EST. PATIENT-LVL IV: CPT | Mod: PBBFAC,,, | Performed by: NURSE PRACTITIONER

## 2018-07-19 PROCEDURE — 3008F BODY MASS INDEX DOCD: CPT | Mod: CPTII,S$GLB,, | Performed by: NURSE PRACTITIONER

## 2018-07-19 PROCEDURE — 96372 THER/PROPH/DIAG INJ SC/IM: CPT | Mod: S$GLB,,, | Performed by: NURSE PRACTITIONER

## 2018-07-19 PROCEDURE — 99214 OFFICE O/P EST MOD 30 MIN: CPT | Mod: 25,S$GLB,, | Performed by: NURSE PRACTITIONER

## 2018-07-19 RX ORDER — BETAMETHASONE SODIUM PHOSPHATE AND BETAMETHASONE ACETATE 3; 3 MG/ML; MG/ML
12 INJECTION, SUSPENSION INTRA-ARTICULAR; INTRALESIONAL; INTRAMUSCULAR; SOFT TISSUE
Status: COMPLETED | OUTPATIENT
Start: 2018-07-19 | End: 2018-07-19

## 2018-07-19 RX ORDER — AMOXICILLIN AND CLAVULANATE POTASSIUM 875; 125 MG/1; MG/1
1 TABLET, FILM COATED ORAL EVERY 12 HOURS
Qty: 14 TABLET | Refills: 0 | Status: SHIPPED | OUTPATIENT
Start: 2018-07-19 | End: 2018-07-26

## 2018-07-19 RX ADMIN — BETAMETHASONE SODIUM PHOSPHATE AND BETAMETHASONE ACETATE 12 MG: 3; 3 INJECTION, SUSPENSION INTRA-ARTICULAR; INTRALESIONAL; INTRAMUSCULAR; SOFT TISSUE at 01:07

## 2018-07-19 NOTE — PATIENT INSTRUCTIONS
Sinusitis (Antibiotic Treatment)    The sinuses are air-filled spaces within the bones of the face. They connect to the inside of the nose. Sinusitis is an inflammation of the tissue lining the sinus cavity. Sinus inflammation can occur during a cold. It can also be due to allergies to pollens and other particles in the air. Sinusitis can cause symptoms of sinus congestion and fullness. A sinus infection causes fever, headache and facial pain. There is often green or yellow drainage from the nose or into the back of the throat (post-nasal drip). You have been given antibiotics to treat this condition.  Home care:  · Take the full course of antibiotics as instructed. Do not stop taking them, even if you feel better.  · Drink plenty of water, hot tea, and other liquids. This may help thin mucus. It also may promote sinus drainage.  · Heat may help soothe painful areas of the face. Use a towel soaked in hot water. Or,  the shower and direct the hot spray onto your face. Using a vaporizer along with a menthol rub at night may also help.   · An expectorant containing guaifenesin may help thin the mucus and promote drainage from the sinuses.  · Over-the-counter decongestants may be used unless a similar medicine was prescribed. Nasal sprays work the fastest. Use one that contains phenylephrine or oxymetazoline. First blow the nose gently. Then use the spray. Do not use these medicines more often than directed on the label or symptoms may get worse. You may also use tablets containing pseudoephedrine. Avoid products that combine ingredients, because side effects may be increased. Read labels. You can also ask the pharmacist for help. (NOTE: Persons with high blood pressure should not use decongestants. They can raise blood pressure.)  · Over-the-counter antihistamines may help if allergies contributed to your sinusitis.    · Do not use nasal rinses or irrigation during an acute sinus infection, unless told to by  your health care provider. Rinsing may spread the infection to other sinuses.  · Use acetaminophen or ibuprofen to control pain, unless another pain medicine was prescribed. (If you have chronic liver or kidney disease or ever had a stomach ulcer, talk with your doctor before using these medicines. Aspirin should never be used in anyone under 18 years of age who is ill with a fever. It may cause severe liver damage.)  · Don't smoke. This can worsen symptoms.  Follow-up care  Follow up with your healthcare provider or our staff if you are not improving within the next week.  When to seek medical advice  Call your healthcare provider if any of these occur:  · Facial pain or headache becoming more severe  · Stiff neck  · Unusual drowsiness or confusion  · Swelling of the forehead or eyelids  · Vision problems, including blurred or double vision  · Fever of 100.4ºF (38ºC) or higher, or as directed by your healthcare provider  · Seizure  · Breathing problems  · Symptoms not resolving within 10 days  Date Last Reviewed: 4/13/2015  © 6291-5808 [employer]. [address] [place] [address], [place] [address]. All rights reserved. This information is not intended as a substitute for professional medical care. Always follow your healthcare professional's instructions.

## 2018-07-19 NOTE — PROGRESS NOTES
Subjective:       Patient ID: Shasta Rivera is a 38 y.o. female.    Chief Complaint: Sinusitis    Sinusitis   This is a new problem. The current episode started 1 to 4 weeks ago. The problem has been rapidly worsening since onset. The maximum temperature recorded prior to her arrival was 100.4 - 100.9 F. The fever has been present for 1 to 2 days. Her pain is at a severity of 6/10. The pain is moderate. Associated symptoms include congestion, headaches and sinus pressure. Pertinent negatives include no chills, coughing, diaphoresis, ear pain, hoarse voice, neck pain, shortness of breath, sneezing, sore throat or swollen glands. Past treatments include oral decongestants and saline sprays. The treatment provided mild relief.           Past Medical History:   Diagnosis Date    Abnormal cervical Pap smear with positive HPV DNA test     Allergic rhinitis     Onychomycosis     SAMIRA on CPAP     Uterine fibroid      Past Surgical History:   Procedure Laterality Date    LUMBAR EPIDURAL INJECTION       Past Surgical History:   Procedure Laterality Date    LUMBAR EPIDURAL INJECTION       Social History     Social History    Marital status:      Spouse name: N/A    Number of children: N/A    Years of education: N/A     Occupational History     [employer]     Social History Main Topics    Smoking status: Never Smoker    Smokeless tobacco: Never Used    Alcohol use 0.6 oz/week     1 Glasses of wine per week    Drug use: No    Sexual activity: No     Other Topics Concern    Not on file     Social History Narrative    The patient is  and works as a .  She is currently getting a Masters in Civil Engineering online.     Review of patient's allergies indicates:  No Known Allergies  Current Outpatient Prescriptions   Medication Sig    ALPRAZolam (XANAX) 0.25 MG tablet TAKE 1-2 TABLETS (0.25-0.5 MG TOTAL) BY MOUTH 3 (THREE) TIMES DAILY AS NEEDED FOR ANXIETY.    cetirizine (ZYRTEC) 10 MG  tablet Take 1 tablet (10 mg total) by mouth once daily.    fluticasone (FLONASE) 50 mcg/actuation nasal spray 2 sprays by Each Nare route once daily.    SUDAFED 12 HOUR 120 mg 12 hr tablet TAKE 1 TABLET (120 MG TOTAL) BY MOUTH 2 (TWO) TIMES DAILY.    zolpidem (AMBIEN) 5 MG Tab TAKE 1 TO 2 TABLETS BY MOUTH NIGHTLY AT BEDTIME AS NEEDED    amoxicillin-clavulanate 875-125mg (AUGMENTIN) 875-125 mg per tablet Take 1 tablet by mouth every 12 (twelve) hours. for 7 days     Current Facility-Administered Medications   Medication    betamethasone acetate-betamethasone sodium phosphate injection 12 mg           Review of Systems   Constitutional: Positive for fever. Negative for activity change, appetite change, chills, diaphoresis, fatigue and unexpected weight change.   HENT: Positive for congestion, postnasal drip, rhinorrhea, sinus pain and sinus pressure. Negative for dental problem, drooling, ear discharge, ear pain, facial swelling, hearing loss, hoarse voice, mouth sores, nosebleeds, sneezing, sore throat, tinnitus, trouble swallowing and voice change.    Eyes: Negative for photophobia, pain and visual disturbance.   Respiratory: Negative for cough, choking, chest tightness, shortness of breath and wheezing.    Cardiovascular: Negative for chest pain, palpitations and leg swelling.   Gastrointestinal: Negative for abdominal distention, abdominal pain, constipation, diarrhea, nausea and vomiting.   Genitourinary: Negative for dysuria.   Musculoskeletal: Negative for arthralgias, back pain, joint swelling, neck pain and neck stiffness.   Skin: Negative for rash.   Allergic/Immunologic: Negative for immunocompromised state.   Neurological: Positive for headaches. Negative for dizziness, tremors, seizures, syncope, facial asymmetry, speech difficulty, weakness, light-headedness and numbness.   Hematological: Negative for adenopathy. Does not bruise/bleed easily.   Psychiatric/Behavioral: Negative for confusion and  sleep disturbance.       Objective:      Physical Exam   Constitutional: She is oriented to person, place, and time. No distress.   HENT:   Right Ear: Tympanic membrane is erythematous.   Left Ear: Tympanic membrane is erythematous.   Nose: Mucosal edema, rhinorrhea and sinus tenderness present.   Mouth/Throat: No oral lesions. No uvula swelling. Posterior oropharyngeal erythema present. No oropharyngeal exudate.   Beefy red appearance to nasal mucosa with purulent discharge. Pain noted when head is lowered.   Eyes: Conjunctivae and EOM are normal. Pupils are equal, round, and reactive to light.   Neck: Normal range of motion. Neck supple.   Cardiovascular: Normal rate, regular rhythm and normal heart sounds.  Exam reveals no gallop and no friction rub.    No murmur heard.  Pulmonary/Chest: Effort normal and breath sounds normal. No respiratory distress. She has no wheezes. She has no rales.   Musculoskeletal: Normal range of motion.   Neurological: She is alert and oriented to person, place, and time.   Skin: Skin is warm and dry.   Psychiatric: She has a normal mood and affect.       Assessment:     Vitals:    07/19/18 1304   BP: 128/78   Pulse: 81   Temp: 98 °F (36.7 °C)         1. Chronic allergic rhinitis    2. Acute non-recurrent frontal sinusitis        Plan:   Chronic allergic rhinitis  -     betamethasone acetate-betamethasone sodium phosphate injection 12 mg; Inject 2 mLs (12 mg total) into the muscle one time.    Acute non-recurrent frontal sinusitis  -     betamethasone acetate-betamethasone sodium phosphate injection 12 mg; Inject 2 mLs (12 mg total) into the muscle one time.  -     amoxicillin-clavulanate 875-125mg (AUGMENTIN) 875-125 mg per tablet; Take 1 tablet by mouth every 12 (twelve) hours. for 7 days  Dispense: 14 tablet; Refill: 0        As above    Home care:  · Take the full course of antibiotics as instructed. Do not stop taking them, even if you feel better.  · Drink plenty of water, hot  tea, and other liquids. This may help thin mucus. It also may promote sinus drainage.  · Heat may help soothe painful areas of the face. Use a towel soaked in hot water. Or,  the shower and direct the hot spray onto your face. Using a vaporizer along with a menthol rub at night may also help.   · An expectorant containing guaifenesin may help thin the mucus and promote drainage from the sinuses.  · Over-the-counter decongestants may be used unless a similar medicine was prescribed. Nasal sprays work the fastest. Use one that contains phenylephrine or oxymetazoline. First blow the nose gently. Then use the spray. Do not use these medicines more often than directed on the label or symptoms may get worse. You may also use tablets containing pseudoephedrine. Avoid products that combine ingredients, because side effects may be increased. Read labels. You can also ask the pharmacist for help. (NOTE: Persons with high blood pressure should not use decongestants. They can raise blood pressure.)  · Over-the-counter antihistamines may help if allergies contributed to your sinusitis.    · Do not use nasal rinses or irrigation during an acute sinus infection, unless told to by your health care provider. Rinsing may spread the infection to other sinuses.  · Use acetaminophen or ibuprofen to control pain, unless another pain medicine was prescribed. (If you have chronic liver or kidney disease or ever had a stomach ulcer, talk with your doctor before using these medicines. Aspirin should never be used in anyone under 18 years of age who is ill with a fever. It may cause severe liver damage.)  · Don't smoke. This can worsen symptoms.  Follow-up care  Follow up with your healthcare provider or our staff if you are not improving within the next week.  When to seek medical advice  Call your healthcare provider if any of these occur:  · Facial pain or headache becoming more severe  · Stiff neck  · Unusual drowsiness or  confusion  · Swelling of the forehead or eyelids  · Vision problems, including blurred or double vision  · Fever of 100.4ºF (38ºC) or higher, or as directed by your healthcare provider  · Seizure  · Breathing problems  · Symptoms not resolving within 10 days

## 2018-07-22 ENCOUNTER — PATIENT MESSAGE (OUTPATIENT)
Dept: INTERNAL MEDICINE | Facility: CLINIC | Age: 38
End: 2018-07-22

## 2018-08-28 ENCOUNTER — OFFICE VISIT (OUTPATIENT)
Dept: FAMILY MEDICINE | Facility: CLINIC | Age: 38
End: 2018-08-28
Payer: COMMERCIAL

## 2018-08-28 VITALS
TEMPERATURE: 99 F | SYSTOLIC BLOOD PRESSURE: 100 MMHG | WEIGHT: 149.5 LBS | HEART RATE: 77 BPM | BODY MASS INDEX: 24.03 KG/M2 | HEIGHT: 66 IN | DIASTOLIC BLOOD PRESSURE: 74 MMHG | OXYGEN SATURATION: 99 % | RESPIRATION RATE: 18 BRPM

## 2018-08-28 DIAGNOSIS — F51.01 PRIMARY INSOMNIA: ICD-10-CM

## 2018-08-28 DIAGNOSIS — Z00.00 PREVENTATIVE HEALTH CARE: Primary | ICD-10-CM

## 2018-08-28 DIAGNOSIS — D25.9 UTERINE LEIOMYOMA, UNSPECIFIED LOCATION: ICD-10-CM

## 2018-08-28 PROCEDURE — 99999 PR PBB SHADOW E&M-EST. PATIENT-LVL III: CPT | Mod: PBBFAC,,, | Performed by: FAMILY MEDICINE

## 2018-08-28 PROCEDURE — 99395 PREV VISIT EST AGE 18-39: CPT | Mod: S$GLB,,, | Performed by: FAMILY MEDICINE

## 2018-08-28 RX ORDER — ZOLPIDEM TARTRATE 5 MG/1
TABLET ORAL
Qty: 30 TABLET | Refills: 5 | Status: SHIPPED | OUTPATIENT
Start: 2018-08-28 | End: 2019-03-14 | Stop reason: SDUPTHER

## 2018-08-28 NOTE — PROGRESS NOTES
CHIEF COMPLAINT:  This is a 38-year-old female here for preventive health exam.      SUBJECTIVE:  The patient is doing well without complaints.  She lost her  in February 2018.  She occasionally uses alprazolam which was prescribed for her at that time.  Her menstrual cycles are regular, often with heavy flow and dysmenorrhea.  Patient has a uterine fibroid.  She takes pseudoephedrine and Zyrtec for allergic rhinitis.  The patient has insomnia for which she uses zolpidem as needed.     Eye exam 2016. Pap smear February 2017, due again in February 2020. Tdap September 2012. Flu vaccine October 2017.     ROS:  GENERAL: Patient denies fever, chills, night sweats. Patient denies weight gain or loss. Patient denies anorexia, fatigue, weakness or swollen glands.  SKIN: Patient denies rash or hair loss.  HEENT: Patient denies sore throat, ear pain, hearing loss, nasal congestion, or runny nose. Patient denies visual disturbance, eye irritation or discharge.  LUNGS: Patient denies cough, wheeze or hemoptysis.  CARDIOVASCULAR: Patient denies chest pain, shortness of breath, palpitations, syncope or lower extremity edema.  GI: Patient denies abdominal pain, nausea, vomiting, diarrhea, constipation, blood in stool or melena.  GENITOURINARY: Patient denies pelvic pain, vaginal discharge, itch or odor. Patient denies irregular vaginal bleeding. Patient denies dysuria, frequency, hematuria, nocturia, urgency or incontinence.  BREASTS: Patient denies breast pain, mass or nipple discharge.  MUSCULOSKELETAL: Patient denies joint pain, swelling, redness or warmth.  NEUROLOGIC: Patient denies headache, vertigo, paresthesias, weakness in limb, dysarthria, dysphagia or abnormality of gait.  PSYCHIATRIC: Patient denies anxiety, depression, or memory loss. Positive for insomnia.     OBJECTIVE:   GENERAL: Well-developed well-nourished black female alert and oriented x3, in no acute distress. Memory, judgment and cognition without  deficit.   SKIN: Clear without rash. Normal color and tone.  HEENT: Eyes: Clear conjunctivae. Pupils equal reactive to light and accommodation. Ears: Clear TMs. Clear canals. Nose: Without congestion. Pharynx: Without injection or exudates.  NECK: Supple, normal range of motion. No masses, lymphadenopathy or enlarged thyroid. No JVD. Carotids 2+ and equal. No bruits.  LUNGS: Clear to auscultation. Normal respiratory effort.  CARDIOVASCULAR: Regular rhythm, normal S1, S2 without murmur, gallop or rub.  BACK: No CVA or spinal tenderness.  BREASTS: No masses, tenderness or nipple discharge.  ABDOMEN: Normal appearance. Active bowel sounds. Soft, nontender without mass or organomegaly. No rebound or guarding.  EXTREMITIES: Without cyanosis, clubbing or edema. Distal pulses 2+ and equal. Normal range of motion in all extremities. No joint effusion, erythema or warmth.  NEUROLOGIC: Cranial nerves II through XII without deficit. Motor strength equal bilaterally. Sensation normal to touch. Deep tendon reflexes 2+ and equal. Gait without abnormality. No tremor. Negative cerebellar signs.   PELVIC:  Deferred due to menses.       ASSESSMENT:  1. Preventative health care    2. Primary insomnia    3. Uterine leiomyoma, unspecified location      PLAN:   1.   Exercise regularly.  2.  Age-appropriate counseling.  3.  Fasting lab.  4.  Refill zolpidem 5 mg.  5.  Follow-up annually.

## 2018-09-01 ENCOUNTER — LAB VISIT (OUTPATIENT)
Dept: LAB | Facility: HOSPITAL | Age: 38
End: 2018-09-01
Attending: FAMILY MEDICINE
Payer: COMMERCIAL

## 2018-09-01 DIAGNOSIS — Z00.00 PREVENTATIVE HEALTH CARE: ICD-10-CM

## 2018-09-01 LAB
ANION GAP SERPL CALC-SCNC: 10 MMOL/L
BASOPHILS # BLD AUTO: 0.04 K/UL
BASOPHILS NFR BLD: 0.7 %
BUN SERPL-MCNC: 16 MG/DL
CALCIUM SERPL-MCNC: 9.5 MG/DL
CHLORIDE SERPL-SCNC: 104 MMOL/L
CHOLEST SERPL-MCNC: 172 MG/DL
CHOLEST/HDLC SERPL: 2.3 {RATIO}
CO2 SERPL-SCNC: 24 MMOL/L
CREAT SERPL-MCNC: 0.8 MG/DL
DIFFERENTIAL METHOD: ABNORMAL
EOSINOPHIL # BLD AUTO: 0.1 K/UL
EOSINOPHIL NFR BLD: 1.4 %
ERYTHROCYTE [DISTWIDTH] IN BLOOD BY AUTOMATED COUNT: 13.5 %
EST. GFR  (AFRICAN AMERICAN): >60 ML/MIN/1.73 M^2
EST. GFR  (NON AFRICAN AMERICAN): >60 ML/MIN/1.73 M^2
GLUCOSE SERPL-MCNC: 87 MG/DL
HCT VFR BLD AUTO: 36.9 %
HDLC SERPL-MCNC: 75 MG/DL
HDLC SERPL: 43.6 %
HGB BLD-MCNC: 11.9 G/DL
IMM GRANULOCYTES # BLD AUTO: 0.01 K/UL
IMM GRANULOCYTES NFR BLD AUTO: 0.2 %
LDLC SERPL CALC-MCNC: 90.8 MG/DL
LYMPHOCYTES # BLD AUTO: 2.1 K/UL
LYMPHOCYTES NFR BLD: 34.9 %
MCH RBC QN AUTO: 30.1 PG
MCHC RBC AUTO-ENTMCNC: 32.2 G/DL
MCV RBC AUTO: 93 FL
MONOCYTES # BLD AUTO: 0.4 K/UL
MONOCYTES NFR BLD: 6.3 %
NEUTROPHILS # BLD AUTO: 3.3 K/UL
NEUTROPHILS NFR BLD: 56.5 %
NONHDLC SERPL-MCNC: 97 MG/DL
NRBC BLD-RTO: 0 /100 WBC
PLATELET # BLD AUTO: 281 K/UL
PMV BLD AUTO: 11.3 FL
POTASSIUM SERPL-SCNC: 4.4 MMOL/L
RBC # BLD AUTO: 3.95 M/UL
SODIUM SERPL-SCNC: 138 MMOL/L
TRIGL SERPL-MCNC: 31 MG/DL
WBC # BLD AUTO: 5.87 K/UL

## 2018-09-01 PROCEDURE — 85025 COMPLETE CBC W/AUTO DIFF WBC: CPT

## 2018-09-01 PROCEDURE — 80048 BASIC METABOLIC PNL TOTAL CA: CPT

## 2018-09-01 PROCEDURE — 36415 COLL VENOUS BLD VENIPUNCTURE: CPT | Mod: PO

## 2018-09-01 PROCEDURE — 80061 LIPID PANEL: CPT

## 2018-11-09 ENCOUNTER — LAB VISIT (OUTPATIENT)
Dept: LAB | Facility: HOSPITAL | Age: 38
End: 2018-11-09
Attending: FAMILY MEDICINE
Payer: COMMERCIAL

## 2018-11-09 ENCOUNTER — OFFICE VISIT (OUTPATIENT)
Dept: INTERNAL MEDICINE | Facility: CLINIC | Age: 38
End: 2018-11-09
Payer: COMMERCIAL

## 2018-11-09 VITALS
HEART RATE: 107 BPM | SYSTOLIC BLOOD PRESSURE: 128 MMHG | OXYGEN SATURATION: 99 % | HEIGHT: 66 IN | BODY MASS INDEX: 24.38 KG/M2 | DIASTOLIC BLOOD PRESSURE: 76 MMHG | TEMPERATURE: 97 F | RESPIRATION RATE: 20 BRPM | WEIGHT: 151.69 LBS

## 2018-11-09 DIAGNOSIS — Z11.3 SCREEN FOR STD (SEXUALLY TRANSMITTED DISEASE): Primary | ICD-10-CM

## 2018-11-09 DIAGNOSIS — Z11.3 SCREEN FOR STD (SEXUALLY TRANSMITTED DISEASE): ICD-10-CM

## 2018-11-09 PROCEDURE — 86703 HIV-1/HIV-2 1 RESULT ANTBDY: CPT

## 2018-11-09 PROCEDURE — 3008F BODY MASS INDEX DOCD: CPT | Mod: CPTII,S$GLB,, | Performed by: FAMILY MEDICINE

## 2018-11-09 PROCEDURE — 87491 CHLMYD TRACH DNA AMP PROBE: CPT

## 2018-11-09 PROCEDURE — 86696 HERPES SIMPLEX TYPE 2 TEST: CPT

## 2018-11-09 PROCEDURE — 86592 SYPHILIS TEST NON-TREP QUAL: CPT

## 2018-11-09 PROCEDURE — 36415 COLL VENOUS BLD VENIPUNCTURE: CPT

## 2018-11-09 PROCEDURE — 99213 OFFICE O/P EST LOW 20 MIN: CPT | Mod: S$GLB,,, | Performed by: FAMILY MEDICINE

## 2018-11-09 PROCEDURE — 99999 PR PBB SHADOW E&M-EST. PATIENT-LVL III: CPT | Mod: PBBFAC,,, | Performed by: FAMILY MEDICINE

## 2018-11-09 PROCEDURE — 80074 ACUTE HEPATITIS PANEL: CPT

## 2018-11-09 NOTE — PROGRESS NOTES
Subjective:       Patient ID: Shasta Rivera is a 38 y.o. female.    Chief Complaint: STD check    HPI     39 yo F    Presents for   STD screen  Unprotected sex 4 days ago  Asymptomatic  No vaginal discharge  No pain / abdomen pain    Seeks to just check for STDS        Review of Systems   Constitutional: Negative for chills and fever.   HENT: Negative for congestion, sinus pressure and voice change.    Eyes: Negative for pain and visual disturbance.   Respiratory: Negative for shortness of breath and wheezing.    Cardiovascular: Negative for chest pain and palpitations.   Gastrointestinal: Negative for constipation and diarrhea.   Genitourinary: Negative for difficulty urinating and dysuria.   Musculoskeletal: Negative for gait problem and myalgias.   Skin: Negative for pallor and rash.   Neurological: Negative for dizziness and light-headedness.   Psychiatric/Behavioral: Negative for confusion and dysphoric mood.       Objective:       Vitals:    11/09/18 1637   BP: 128/76   Pulse: 107   Resp: 20   Temp: 97.1 °F (36.2 °C)       Physical Exam   Constitutional: She is oriented to person, place, and time. She appears well-developed and well-nourished. She does not have a sickly appearance. No distress.   HENT:   Head: Normocephalic and atraumatic.   Right Ear: External ear normal.   Left Ear: External ear normal.   Eyes: Conjunctivae, EOM and lids are normal.   Neck: Trachea normal, normal range of motion and full passive range of motion without pain.   Cardiovascular: Normal rate, regular rhythm and normal heart sounds.   Pulmonary/Chest: Effort normal and breath sounds normal. No stridor. No respiratory distress.   Abdominal: Soft. Normal appearance and bowel sounds are normal. She exhibits no distension. There is no tenderness. There is no guarding. No hernia.   Musculoskeletal: Normal range of motion.   Lymphadenopathy:     She has no cervical adenopathy.   Neurological: She is alert and oriented to person,  place, and time. She is not disoriented.   Skin: Skin is warm, dry and intact. No rash noted. She is not diaphoretic.   Psychiatric: She has a normal mood and affect. Her speech is normal and behavior is normal. Thought content normal.       Assessment:       1. Screen for STD (sexually transmitted disease)        Plan:   Screen for STD (sexually transmitted disease)  -     C. trachomatis/N. gonorrhoeae by AMP DNA  -     Hepatitis panel, acute; Future; Expected date: 11/09/2018  -     Herpes Simplex Virus (HSV) Types 1 & 2, IgG, Herpes Titer; Future; Expected date: 11/09/2018  -     HIV-1 and HIV-2 antibodies; Future; Expected date: 11/09/2018  -     RPR; Future; Expected date: 11/09/2018  -     Herpes Simplex Virus (HSV) Types 1 & 2, IgG, Herpes Titer; Future; Expected date: 11/09/2018      Offered pregnancy test - patient declined  Discussed concern too soon to test positive given 4 days ago.  Encouraged to return or see her PCP in 4 weeks for repeat testing.  Patient agreed/understood.  Patient seemed anxious and uncomfortable.  I assessed for any form of trauma.  She denies any assault. She reports no concern with her safety - just wants to get an STD screen.    No Follow-up on file.

## 2018-11-10 LAB
C TRACH DNA SPEC QL NAA+PROBE: NOT DETECTED
N GONORRHOEA DNA SPEC QL NAA+PROBE: NOT DETECTED
RPR SER QL: NORMAL

## 2018-11-12 ENCOUNTER — TELEPHONE (OUTPATIENT)
Dept: INTERNAL MEDICINE | Facility: CLINIC | Age: 38
End: 2018-11-12

## 2018-11-12 LAB
HAV IGM SERPL QL IA: NEGATIVE
HBV CORE IGM SERPL QL IA: NEGATIVE
HBV SURFACE AG SERPL QL IA: NEGATIVE
HCV AB SERPL QL IA: NEGATIVE
HIV 1+2 AB+HIV1 P24 AG SERPL QL IA: NEGATIVE

## 2018-11-13 LAB
HSV1 IGG SERPL QL IA: NEGATIVE
HSV1 IGG SERPL QL IA: NEGATIVE
HSV2 IGG SERPL QL IA: POSITIVE
HSV2 IGG SERPL QL IA: POSITIVE

## 2018-11-14 ENCOUNTER — TELEPHONE (OUTPATIENT)
Dept: INTERNAL MEDICINE | Facility: CLINIC | Age: 38
End: 2018-11-14

## 2018-11-14 ENCOUNTER — PATIENT MESSAGE (OUTPATIENT)
Dept: INTERNAL MEDICINE | Facility: CLINIC | Age: 38
End: 2018-11-14

## 2018-11-14 NOTE — TELEPHONE ENCOUNTER
----- Message from Edd Rizzo MD sent at 11/14/2018 11:09 AM CST -----  Please call the patient regarding her abnormal result.  HSV 2 is positive.  This means exposure to Herpes simplex virus in the past.  If having no symptoms ( skin lesions on genital area ) no treatment in necessary .  Other labs negative  Will need to have repeated in several weeks.

## 2018-11-14 NOTE — TELEPHONE ENCOUNTER
Pt notified of lab results   Verbalized understanding  When does pt need to come in to have repeat test done, and also we need order for the repeat test

## 2018-11-15 NOTE — TELEPHONE ENCOUNTER
She can discuss this with Dr. Rizzo on return.  I concur with Marya Lara NP, the serology will remain positive.  Repeating will only yield a positive result.    Have her follow up with Dr. Rizzo in next couple of weeks to discuss labs in detail

## 2018-11-15 NOTE — TELEPHONE ENCOUNTER
Spoke with patient and informed her that once test is positive it will remain positive so any repeat test will yield the same results. Patient informed that if she notices an out break or any genital lesions she can come in and be treated. Patient verbalized understanding and stated she would still like to repeat lab work.

## 2018-11-15 NOTE — TELEPHONE ENCOUNTER
There is no need to repeat this test.  I do not know why this would need to be done.  But Dr. Rizzo is the one who ordered the test not me.  Once herpes serology is positive, it stays positive.

## 2018-11-20 NOTE — TELEPHONE ENCOUNTER
Called and discussed labs with patient. Patient informed Dr Rizzo would like to repeat HIV lab in 6 weeks, and if she would still like to repeat HSV lab it can be done at that time. Patient verbalized understanding and would like both labs repeated on January 5, 2019 at [facility].

## 2018-11-23 DIAGNOSIS — Z11.3 SCREEN FOR STD (SEXUALLY TRANSMITTED DISEASE): Primary | ICD-10-CM

## 2018-11-26 ENCOUNTER — PATIENT MESSAGE (OUTPATIENT)
Dept: INTERNAL MEDICINE | Facility: CLINIC | Age: 38
End: 2018-11-26

## 2018-12-07 ENCOUNTER — OFFICE VISIT (OUTPATIENT)
Dept: INTERNAL MEDICINE | Facility: CLINIC | Age: 38
End: 2018-12-07
Payer: COMMERCIAL

## 2018-12-07 VITALS
HEIGHT: 66 IN | HEART RATE: 92 BPM | WEIGHT: 155.63 LBS | TEMPERATURE: 97 F | BODY MASS INDEX: 25.01 KG/M2 | DIASTOLIC BLOOD PRESSURE: 68 MMHG | SYSTOLIC BLOOD PRESSURE: 104 MMHG

## 2018-12-07 DIAGNOSIS — B96.89 BV (BACTERIAL VAGINOSIS): ICD-10-CM

## 2018-12-07 DIAGNOSIS — N76.0 BV (BACTERIAL VAGINOSIS): ICD-10-CM

## 2018-12-07 DIAGNOSIS — B37.31 YEAST VAGINITIS: Primary | ICD-10-CM

## 2018-12-07 PROCEDURE — 99999 PR PBB SHADOW E&M-EST. PATIENT-LVL III: CPT | Mod: PBBFAC,,, | Performed by: NURSE PRACTITIONER

## 2018-12-07 PROCEDURE — 99214 OFFICE O/P EST MOD 30 MIN: CPT | Mod: S$GLB,,, | Performed by: NURSE PRACTITIONER

## 2018-12-07 PROCEDURE — 3008F BODY MASS INDEX DOCD: CPT | Mod: CPTII,S$GLB,, | Performed by: NURSE PRACTITIONER

## 2018-12-07 RX ORDER — METRONIDAZOLE 7.5 MG/G
1 GEL VAGINAL NIGHTLY
Qty: 70 G | Refills: 0 | Status: SHIPPED | OUTPATIENT
Start: 2018-12-07 | End: 2018-12-14

## 2018-12-07 RX ORDER — FLUCONAZOLE 150 MG/1
150 TABLET ORAL ONCE
Qty: 2 TABLET | Refills: 0 | Status: SHIPPED | OUTPATIENT
Start: 2018-12-07 | End: 2018-12-07

## 2018-12-07 NOTE — PROGRESS NOTES
"Subjective:       Patient ID: Shasta Rivera is a 38 y.o. female.    Chief Complaint: Vaginitis    Patient comes in today with concern of vaginal discharge ongoing for a few days.  She has had an issue with the yeast infection prior to her last.  That resolved with the Diflucan however returned after the.  Resolved.  She denies any concern of STDs and reports recent STD screening was negative.  She reports her discharge is a thin yellow with no odor.  No itching. This is how her yeast infections have presented in the past. No dysuria.         /68   Pulse 92   Temp 97.2 °F (36.2 °C) (Tympanic)   Ht 5' 6.25" (1.683 m)   Wt 70.6 kg (155 lb 10.3 oz)   BMI 24.93 kg/m²     Review of Systems   Constitutional: Negative for activity change, appetite change, chills, diaphoresis, fatigue, fever and unexpected weight change.   HENT: Negative.    Eyes: Negative for visual disturbance.   Respiratory: Negative for cough, shortness of breath and wheezing.    Cardiovascular: Negative for chest pain, palpitations and leg swelling.   Gastrointestinal: Negative for abdominal distention, abdominal pain, blood in stool, constipation, diarrhea, nausea and vomiting.   Genitourinary: Positive for vaginal discharge. Negative for decreased urine volume, difficulty urinating, dyspareunia, dysuria, enuresis, flank pain, frequency, genital sores, hematuria, menstrual problem, pelvic pain, urgency, vaginal bleeding and vaginal pain.   Skin: Negative for rash.   Neurological: Negative.  Negative for dizziness, syncope, speech difficulty, light-headedness and headaches.   Psychiatric/Behavioral: Negative for agitation, confusion and hallucinations. The patient is not nervous/anxious.        Objective:      Physical Exam   Constitutional: She is oriented to person, place, and time. She appears well-developed and well-nourished. She is cooperative. No distress.   HENT:   Head: Normocephalic and atraumatic.   Eyes: Conjunctivae are " normal. Right eye exhibits no discharge. Left eye exhibits no discharge.   Cardiovascular: Normal rate, regular rhythm and normal heart sounds.   No murmur heard.  Pulmonary/Chest: Effort normal and breath sounds normal. No respiratory distress. She has no wheezes. She has no rales. She exhibits no tenderness.   Abdominal: Soft. She exhibits no distension and no mass. There is no tenderness. There is no rebound and no guarding. No hernia.   Musculoskeletal: Normal range of motion.   Neurological: She is alert and oriented to person, place, and time.   Skin: Skin is warm and dry. No rash noted. She is not diaphoretic.   Psychiatric: She has a normal mood and affect. Her behavior is normal. Judgment and thought content normal.   Nursing note and vitals reviewed.      Assessment:       1. Yeast vaginitis    2. BV (bacterial vaginosis)        Plan:       Shasta was seen today for vaginitis.    Diagnoses and all orders for this visit:    Yeast vaginitis  -     fluconazole (DIFLUCAN) 150 MG Tab; Take 1 tablet (150 mg total) by mouth once. Repeat in 3 days if symptoms still persist. for 1 dose    BV (bacterial vaginosis)  -     metroNIDAZOLE (METROGEL) 0.75 % vaginal gel; Place 1 applicator vaginally every evening. for 7 days      Encouraged patient to have a vaginosis screen today, she declines and just wants to be treated for yeast and bacterial vaginosis today. She agrees if symptoms do not improve, she will come in for swabbing. She declines swab today. She is insistent that she has no concern for std.

## 2018-12-22 ENCOUNTER — LAB VISIT (OUTPATIENT)
Dept: LAB | Facility: HOSPITAL | Age: 38
End: 2018-12-22
Attending: FAMILY MEDICINE
Payer: COMMERCIAL

## 2018-12-22 DIAGNOSIS — Z11.3 SCREEN FOR STD (SEXUALLY TRANSMITTED DISEASE): ICD-10-CM

## 2018-12-22 PROCEDURE — 36415 COLL VENOUS BLD VENIPUNCTURE: CPT | Mod: PO

## 2018-12-22 PROCEDURE — 86696 HERPES SIMPLEX TYPE 2 TEST: CPT

## 2018-12-22 PROCEDURE — 86703 HIV-1/HIV-2 1 RESULT ANTBDY: CPT

## 2018-12-24 LAB — HIV 1+2 AB+HIV1 P24 AG SERPL QL IA: NEGATIVE

## 2018-12-26 LAB
HSV1 IGG SERPL QL IA: NEGATIVE
HSV2 IGG SERPL QL IA: POSITIVE

## 2019-01-07 RX ORDER — ALPRAZOLAM 0.25 MG/1
.25-.5 TABLET ORAL 3 TIMES DAILY PRN
Qty: 30 TABLET | Refills: 2 | Status: SHIPPED | OUTPATIENT
Start: 2019-01-07 | End: 2019-12-14 | Stop reason: SDUPTHER

## 2019-01-17 ENCOUNTER — PATIENT MESSAGE (OUTPATIENT)
Dept: PULMONOLOGY | Facility: CLINIC | Age: 39
End: 2019-01-17

## 2019-01-25 RX ORDER — PSEUDOEPHEDRINE HYDROCHLORIDE 120 MG/1
120 TABLET, FILM COATED, EXTENDED RELEASE ORAL 2 TIMES DAILY
Qty: 60 TABLET | Refills: 5 | Status: SHIPPED | OUTPATIENT
Start: 2019-01-25 | End: 2020-03-23

## 2019-03-14 ENCOUNTER — LAB VISIT (OUTPATIENT)
Dept: LAB | Facility: HOSPITAL | Age: 39
End: 2019-03-14
Payer: COMMERCIAL

## 2019-03-14 ENCOUNTER — OFFICE VISIT (OUTPATIENT)
Dept: FAMILY MEDICINE | Facility: CLINIC | Age: 39
End: 2019-03-14
Payer: COMMERCIAL

## 2019-03-14 VITALS
HEIGHT: 66 IN | TEMPERATURE: 98 F | RESPIRATION RATE: 17 BRPM | SYSTOLIC BLOOD PRESSURE: 122 MMHG | BODY MASS INDEX: 24.77 KG/M2 | WEIGHT: 154.13 LBS | DIASTOLIC BLOOD PRESSURE: 78 MMHG

## 2019-03-14 DIAGNOSIS — Z00.00 PREVENTATIVE HEALTH CARE: Primary | ICD-10-CM

## 2019-03-14 DIAGNOSIS — Z12.31 ENCOUNTER FOR SCREENING MAMMOGRAM FOR MALIGNANT NEOPLASM OF BREAST: ICD-10-CM

## 2019-03-14 DIAGNOSIS — J30.9 ALLERGIC RHINITIS, UNSPECIFIED SEASONALITY, UNSPECIFIED TRIGGER: ICD-10-CM

## 2019-03-14 DIAGNOSIS — D25.9 UTERINE LEIOMYOMA, UNSPECIFIED LOCATION: ICD-10-CM

## 2019-03-14 DIAGNOSIS — Z00.00 PREVENTATIVE HEALTH CARE: ICD-10-CM

## 2019-03-14 DIAGNOSIS — F51.01 PRIMARY INSOMNIA: ICD-10-CM

## 2019-03-14 LAB
BASOPHILS # BLD AUTO: 0.06 K/UL
BASOPHILS NFR BLD: 0.7 %
DIFFERENTIAL METHOD: ABNORMAL
EOSINOPHIL # BLD AUTO: 0.1 K/UL
EOSINOPHIL NFR BLD: 1 %
ERYTHROCYTE [DISTWIDTH] IN BLOOD BY AUTOMATED COUNT: 12.8 %
HCT VFR BLD AUTO: 36.8 %
HGB BLD-MCNC: 12.1 G/DL
IMM GRANULOCYTES # BLD AUTO: 0.03 K/UL
IMM GRANULOCYTES NFR BLD AUTO: 0.3 %
LYMPHOCYTES # BLD AUTO: 2.8 K/UL
LYMPHOCYTES NFR BLD: 31.3 %
MCH RBC QN AUTO: 30 PG
MCHC RBC AUTO-ENTMCNC: 32.9 G/DL
MCV RBC AUTO: 91 FL
MONOCYTES # BLD AUTO: 0.8 K/UL
MONOCYTES NFR BLD: 8.4 %
NEUTROPHILS # BLD AUTO: 5.2 K/UL
NEUTROPHILS NFR BLD: 58.3 %
NRBC BLD-RTO: 0 /100 WBC
PLATELET # BLD AUTO: 318 K/UL
PMV BLD AUTO: 11.2 FL
RBC # BLD AUTO: 4.03 M/UL
WBC # BLD AUTO: 8.94 K/UL

## 2019-03-14 PROCEDURE — 36415 COLL VENOUS BLD VENIPUNCTURE: CPT | Mod: PO

## 2019-03-14 PROCEDURE — 99396 PR PREVENTIVE VISIT,EST,40-64: ICD-10-PCS | Mod: S$GLB,,, | Performed by: FAMILY MEDICINE

## 2019-03-14 PROCEDURE — 99396 PREV VISIT EST AGE 40-64: CPT | Mod: S$GLB,,, | Performed by: FAMILY MEDICINE

## 2019-03-14 PROCEDURE — 99999 PR PBB SHADOW E&M-EST. PATIENT-LVL III: CPT | Mod: PBBFAC,,, | Performed by: FAMILY MEDICINE

## 2019-03-14 PROCEDURE — 99999 PR PBB SHADOW E&M-EST. PATIENT-LVL III: ICD-10-PCS | Mod: PBBFAC,,, | Performed by: FAMILY MEDICINE

## 2019-03-14 PROCEDURE — 85025 COMPLETE CBC W/AUTO DIFF WBC: CPT

## 2019-03-14 RX ORDER — ZOLPIDEM TARTRATE 5 MG/1
TABLET ORAL
Qty: 30 TABLET | Refills: 5 | Status: SHIPPED | OUTPATIENT
Start: 2019-03-14 | End: 2019-10-06 | Stop reason: SDUPTHER

## 2019-03-14 NOTE — PROGRESS NOTES
CHIEF COMPLAINT:  This is a 39-year-old female here for preventive health exam.    SUBJECTIVE:  Patient is doing well without complaints except for intermittent tightness of left upper extremity.  Patient recently had serology which was positive for herpes simplex 2.  She  reports no past history of herpetic outbreaks.  She continues to have heavy painful periods.  CBC in September 2018 showed mild anemia.  She has known uterine fibroid.  She occasionally uses alprazolam for anxiety.  She takes zolpidem 5 mg for sleeplessness.  She takes pseudoephedrine and Zyrtec for allergic rhinitis.  The patient often has numbness and discoloration of finger tips especially when exposed to cold.     Eye exam 2016. Pap smear February 2017, due again in February 2020. Tdap September 2012. Flu vaccine October 2017.     ROS:  GENERAL: Patient denies fever, chills, night sweats. Patient denies weight gain or loss. Patient denies anorexia, fatigue, weakness or swollen glands.  SKIN: Patient denies rash or hair loss.  HEENT: Patient denies sore throat, ear pain, hearing loss, nasal congestion, or runny nose. Patient denies visual disturbance, eye irritation or discharge.  LUNGS: Patient denies cough, wheeze or hemoptysis.  CARDIOVASCULAR: Patient denies chest pain, shortness of breath, palpitations, syncope or lower extremity edema.  GI: Patient denies abdominal pain, nausea, vomiting, diarrhea, constipation, blood in stool or melena.  GENITOURINARY: Patient denies pelvic pain, vaginal discharge, itch or odor. Patient denies irregular vaginal bleeding. Patient denies dysuria, frequency, hematuria, nocturia, urgency or incontinence.  BREASTS: Patient denies breast pain, mass or nipple discharge.  MUSCULOSKELETAL: Patient denies joint pain, swelling, redness or warmth.  NEUROLOGIC: Patient denies headache, vertigo, paresthesias, weakness in limb, dysarthria, dysphagia or abnormality of gait.  PSYCHIATRIC: Patient denies anxiety,  depression, or memory loss. Positive for insomnia.     OBJECTIVE:   GENERAL: Well-developed well-nourished black female alert and oriented x3, in no acute distress. Memory, judgment and cognition without deficit.   SKIN: Clear without rash. Normal color and tone.  HEENT: Eyes: Clear conjunctivae. Pupils equal reactive to light and accommodation. Ears: Clear TMs. Clear canals. Nose: Without congestion. Pharynx: Without injection or exudates.  NECK: Supple, normal range of motion. No masses, lymphadenopathy or enlarged thyroid. No JVD. Carotids 2+ and equal. No bruits.  LUNGS: Clear to auscultation. Normal respiratory effort.  CARDIOVASCULAR: Regular rhythm, normal S1, S2 without murmur, gallop or rub.  BACK: No CVA or spinal tenderness.  BREASTS: No masses, tenderness or nipple discharge.  ABDOMEN: Normal appearance. Active bowel sounds. Soft, nontender without mass or organomegaly. No rebound or guarding.  EXTREMITIES: Without cyanosis, clubbing or edema. Distal pulses 2+ and equal. Normal range of motion in all extremities. No joint effusion, erythema or warmth.  NEUROLOGIC: Cranial nerves II through XII without deficit. Motor strength equal bilaterally. Sensation normal to touch. Deep tendon reflexes 2+ and equal. Gait without abnormality. No tremor. Negative cerebellar signs.   PELVIC: External: Without lesions or inflammation.  Vaginal: Clear.  Cervix: Small nabothian cyst, nontender. No Pap.  Uterus: Enlarged 10-12 week size and retroflexed.  Adnexa: Non-tender, without masses.     ASSESSMENT:  1. Preventative health care    2. Primary insomnia    3. Allergic rhinitis, unspecified seasonality, unspecified trigger    4. Uterine leiomyoma, unspecified location    5. Encounter for screening mammogram for malignant neoplasm of breast      PLAN:   1.  Exercise regularly.  2.  Age-appropriate counseling.  3.  Mammogram.  4.  Check CBC.  5.  Discussed epidemiology of herpes simplex infection.  6.  Patient declines  OBGYN consult.  7.  Refill zolpidem.  8.  Follow-up annually.

## 2019-04-12 ENCOUNTER — HOSPITAL ENCOUNTER (OUTPATIENT)
Dept: RADIOLOGY | Facility: HOSPITAL | Age: 39
Discharge: HOME | End: 2019-04-12
Attending: FAMILY MEDICINE
Payer: COMMERCIAL

## 2019-04-12 VITALS — BODY MASS INDEX: 24.75 KG/M2 | HEIGHT: 66 IN | WEIGHT: 154 LBS

## 2019-04-12 DIAGNOSIS — Z12.31 ENCOUNTER FOR SCREENING MAMMOGRAM FOR MALIGNANT NEOPLASM OF BREAST: ICD-10-CM

## 2019-04-12 PROCEDURE — 77067 SCR MAMMO BI INCL CAD: CPT | Mod: 26,,, | Performed by: RADIOLOGY

## 2019-04-12 PROCEDURE — 77067 SCR MAMMO BI INCL CAD: CPT | Mod: TC

## 2019-04-12 PROCEDURE — 77063 BREAST TOMOSYNTHESIS BI: CPT | Mod: 26,,, | Performed by: RADIOLOGY

## 2019-04-12 PROCEDURE — 77067 MAMMO DIGITAL SCREENING BILAT WITH TOMOSYNTHESIS_CAD: ICD-10-PCS | Mod: 26,,, | Performed by: RADIOLOGY

## 2019-04-12 PROCEDURE — 77063 MAMMO DIGITAL SCREENING BILAT WITH TOMOSYNTHESIS_CAD: ICD-10-PCS | Mod: 26,,, | Performed by: RADIOLOGY

## 2019-05-10 ENCOUNTER — TELEPHONE (OUTPATIENT)
Dept: FAMILY MEDICINE | Facility: CLINIC | Age: 39
End: 2019-05-10

## 2019-05-10 NOTE — TELEPHONE ENCOUNTER
----- Message from Yoana Coulter sent at 5/10/2019  9:04 AM CDT -----  Contact: [facility])-[phone]  Would like to consult with nurse regarding medication(XANAX 0.25 MG). Pharmacy has some question about medication. Please call back at [phone]. Thanks/ar

## 2019-05-10 NOTE — TELEPHONE ENCOUNTER
Spoke with Spike from [facility] and informed him that it ok with Dr. Redmond, if medication was filled along with norco, with understanding.

## 2019-05-10 NOTE — TELEPHONE ENCOUNTER
Spoke with hannah from [facility] and he stated that the pt had 30 norcos 10mg pills prescribed by another doctor this month and he wanted to know if it was ok for him to fill her xanax along with the norco.

## 2019-10-07 RX ORDER — ZOLPIDEM TARTRATE 5 MG/1
TABLET ORAL
Qty: 30 TABLET | Refills: 5 | Status: SHIPPED | OUTPATIENT
Start: 2019-10-07 | End: 2020-05-04 | Stop reason: SDUPTHER

## 2019-11-26 ENCOUNTER — OFFICE VISIT (OUTPATIENT)
Dept: FAMILY MEDICINE | Facility: CLINIC | Age: 39
End: 2019-11-26
Payer: COMMERCIAL

## 2019-11-26 ENCOUNTER — PATIENT MESSAGE (OUTPATIENT)
Dept: FAMILY MEDICINE | Facility: CLINIC | Age: 39
End: 2019-11-26

## 2019-11-26 VITALS
WEIGHT: 150.38 LBS | HEIGHT: 66 IN | DIASTOLIC BLOOD PRESSURE: 72 MMHG | TEMPERATURE: 99 F | HEART RATE: 102 BPM | RESPIRATION RATE: 18 BRPM | SYSTOLIC BLOOD PRESSURE: 120 MMHG | BODY MASS INDEX: 24.17 KG/M2 | OXYGEN SATURATION: 100 %

## 2019-11-26 DIAGNOSIS — K52.9 ACUTE GASTROENTERITIS: Primary | ICD-10-CM

## 2019-11-26 PROCEDURE — 99999 PR PBB SHADOW E&M-EST. PATIENT-LVL III: ICD-10-PCS | Mod: PBBFAC,,, | Performed by: FAMILY MEDICINE

## 2019-11-26 PROCEDURE — 99999 PR PBB SHADOW E&M-EST. PATIENT-LVL III: CPT | Mod: PBBFAC,,, | Performed by: FAMILY MEDICINE

## 2019-11-26 PROCEDURE — 3008F PR BODY MASS INDEX (BMI) DOCUMENTED: ICD-10-PCS | Mod: CPTII,S$GLB,, | Performed by: FAMILY MEDICINE

## 2019-11-26 PROCEDURE — 3008F BODY MASS INDEX DOCD: CPT | Mod: CPTII,S$GLB,, | Performed by: FAMILY MEDICINE

## 2019-11-26 PROCEDURE — 99213 OFFICE O/P EST LOW 20 MIN: CPT | Mod: S$GLB,,, | Performed by: FAMILY MEDICINE

## 2019-11-26 PROCEDURE — 99213 PR OFFICE/OUTPT VISIT, EST, LEVL III, 20-29 MIN: ICD-10-PCS | Mod: S$GLB,,, | Performed by: FAMILY MEDICINE

## 2019-11-26 RX ORDER — PROMETHAZINE HYDROCHLORIDE 25 MG/1
25 TABLET ORAL
Qty: 20 TABLET | Refills: 0 | Status: SHIPPED | OUTPATIENT
Start: 2019-11-26 | End: 2019-11-26 | Stop reason: SDUPTHER

## 2019-11-26 RX ORDER — PROMETHAZINE HYDROCHLORIDE 25 MG/1
25 TABLET ORAL
Qty: 20 TABLET | Refills: 0 | Status: SHIPPED | OUTPATIENT
Start: 2019-11-26 | End: 2019-11-27 | Stop reason: SDUPTHER

## 2019-11-26 RX ORDER — HYDROCODONE BITARTRATE AND ACETAMINOPHEN 10; 325 MG/1; MG/1
1 TABLET ORAL 2 TIMES DAILY PRN
Refills: 0 | COMMUNITY
Start: 2019-09-08 | End: 2020-05-04

## 2019-11-26 NOTE — PROGRESS NOTES
CHIEF COMPLAINT:  This is a 39-year-old female complaining of persistent diarrhea.    SUBJECTIVE:  The patient had acute onset of frequent stooling at 2 o'clock in the morning 72 hr ago.  Stools were followed by emesis.  Patient had tried a new dish at a favorite restaurant.  The next day she ate gumbo and went to a football game.  She began having abdominal gurgling and mild cramping and then had diarrhea.  Diarrhea seemed to subside for 24 hr but then returned this morning.  Stool is black colored and lose.  Patient admits to taking Pepto-Bismol.  She has had no further emesis but complains of nausea.  She denies fever or chills.  She denies ill contacts.    ROS:  GENERAL: Patient denies fever, chills, night sweats. Patient denies weight gain or loss. Patient denies anorexia, weakness or swollen glands.  Positive for fatigue.  SKIN: Patient denies rash.  HEENT: Patient denies sore throat, ear pain, hearing loss, nasal congestion, or runny nose. Patient denies visual disturbance, eye irritation or discharge.  LUNGS: Patient denies cough, wheeze or hemoptysis.  CARDIOVASCULAR: Patient denies chest pain, shortness of breath, palpitations, syncope or lower extremity edema.  GI: Patient denies constipation, blood in stool or melena.  Positive for abdominal pain, nausea, vomiting and diarrhea.  GENITOURINARY: Patient denies pelvic pain, vaginal discharge, itch or odor. Patient denies irregular vaginal bleeding. Patient denies dysuria, frequency, hematuria, nocturia, urgency or incontinence.    OBJECTIVE:   GENERAL: Well-developed well-nourished mildly ill-appearing black female alert and oriented x3, in no acute distress. Memory, judgment and cognition without deficit.   SKIN: Clear without rash. Normal color and tone.  HEENT: Eyes: Clear conjunctivae. Pupils equal reactive to light and accommodation. Ears: Clear TMs. Clear canals. Nose: Without congestion. Pharynx: Without injection or exudates.  Moist mucous  membranes.  NECK: Supple, normal range of motion. No masses, lymphadenopathy or enlarged thyroid. No JVD. Carotids 2+ and equal. No bruits.  LUNGS: Clear to auscultation. Normal respiratory effort.  CARDIOVASCULAR: Regular rhythm, normal S1, S2 without murmur, gallop or rub.  BACK: No CVA or spinal tenderness.  ABDOMEN: Normal appearance.  Decreased  bowel sounds. Soft, nontender without mass or organomegaly. No rebound or guarding.  NEUROLOGIC:  Gait without abnormality. No tremor.     ASSESSMENT:  1. Acute gastroenteritis    ]  PLAN:   1.  Clear liquid diet.  Avoid caffeine and dairy products.  2.  Advanced diet as tolerated.  Avoid fried, greasy or spicy foods.  3.  Phenergan 25 mg every 4-6 hours as needed for nausea.  4.  Follow up if no improvement or worsening symptoms.    This note is generated with speech recognition software and is subject to transcription error and sound alike phrases that may be missed by proofreading.

## 2019-11-27 ENCOUNTER — PATIENT MESSAGE (OUTPATIENT)
Dept: FAMILY MEDICINE | Facility: CLINIC | Age: 39
End: 2019-11-27

## 2019-11-27 NOTE — TELEPHONE ENCOUNTER
Pt states that medication was sent to the wrong pharmacy and needs to be sent to the pharmacy that is selected on this message.

## 2019-11-27 NOTE — TELEPHONE ENCOUNTER
Darby message from patient:  Good evening   [facility].  Is there another way to send it?  If so if you let me know once it is sent I will confirm with them before your clinic closes.   Thanks    Called [facility]: 'We're sorry, your call cannot be completed at this time. Please hang up and try again later' is the message received after ringing for a while

## 2019-11-28 RX ORDER — PROMETHAZINE HYDROCHLORIDE 25 MG/1
25 TABLET ORAL
Qty: 20 TABLET | Refills: 0 | Status: SHIPPED | OUTPATIENT
Start: 2019-11-28 | End: 2020-05-04

## 2019-12-16 RX ORDER — ALPRAZOLAM 0.25 MG/1
TABLET ORAL
Qty: 30 TABLET | Refills: 2 | Status: SHIPPED | OUTPATIENT
Start: 2019-12-16 | End: 2020-05-04

## 2020-01-16 ENCOUNTER — OFFICE VISIT (OUTPATIENT)
Dept: PULMONOLOGY | Facility: CLINIC | Age: 40
End: 2020-01-16
Payer: COMMERCIAL

## 2020-01-16 VITALS
BODY MASS INDEX: 24.84 KG/M2 | HEART RATE: 86 BPM | DIASTOLIC BLOOD PRESSURE: 70 MMHG | HEIGHT: 66 IN | SYSTOLIC BLOOD PRESSURE: 112 MMHG | RESPIRATION RATE: 16 BRPM | OXYGEN SATURATION: 97 % | WEIGHT: 154.56 LBS

## 2020-01-16 DIAGNOSIS — Z72.820 POOR SLEEP: ICD-10-CM

## 2020-01-16 DIAGNOSIS — G47.33 OSA (OBSTRUCTIVE SLEEP APNEA): Primary | ICD-10-CM

## 2020-01-16 DIAGNOSIS — G47.10 HYPERSOMNIA: ICD-10-CM

## 2020-01-16 PROCEDURE — 99213 PR OFFICE/OUTPT VISIT, EST, LEVL III, 20-29 MIN: ICD-10-PCS | Mod: S$GLB,,, | Performed by: NURSE PRACTITIONER

## 2020-01-16 PROCEDURE — 99213 OFFICE O/P EST LOW 20 MIN: CPT | Mod: S$GLB,,, | Performed by: NURSE PRACTITIONER

## 2020-01-16 PROCEDURE — 99999 PR PBB SHADOW E&M-EST. PATIENT-LVL III: ICD-10-PCS | Mod: PBBFAC,,, | Performed by: NURSE PRACTITIONER

## 2020-01-16 PROCEDURE — 99999 PR PBB SHADOW E&M-EST. PATIENT-LVL III: CPT | Mod: PBBFAC,,, | Performed by: NURSE PRACTITIONER

## 2020-01-16 NOTE — PROGRESS NOTES
"Subjective:      Patient ID: Shasta Rivera is a 39 y.o. female.    Chief Complaint: Sleep Apnea    HPI  Patient presents to the office today for evaluation of sleep apnea.  She has not worn her CPAP in 2 years.  She is wanting this start back on her auto PAP.  She needs supplies.  She is waking up gasping for air.  Poor sleep quality.  Williams Sleepiness Scale score 12.  Weight is stable from visit in 2017.    Patient Active Problem List   Diagnosis    Allergic rhinitis    Uterine fibroid    Primary insomnia    SAMIRA (obstructive sleep apnea)       /70   Pulse 86   Resp 16   Ht 5' 6" (1.676 m)   Wt 70.1 kg (154 lb 8.7 oz)   SpO2 97%   BMI 24.94 kg/m²   Body mass index is 24.94 kg/m².    Review of Systems   Respiratory: Positive for snoring and asthma nighttime symptoms.    Psychiatric/Behavioral: Positive for sleep disturbance.   All other systems reviewed and are negative.    Objective:      Physical Exam   Constitutional: She is oriented to person, place, and time. She appears well-developed and well-nourished.   HENT:   Head: Normocephalic and atraumatic.   Neck: Normal range of motion. Neck supple.   Musculoskeletal: Normal range of motion.   Neurological: She is alert and oriented to person, place, and time.   Skin: Skin is warm and dry.   Psychiatric: She has a normal mood and affect.     Personal Diagnostic Review  No recent data      Assessment:       1. SAMIRA (obstructive sleep apnea)    2. Hypersomnia    3. Poor sleep        Outpatient Encounter Medications as of 1/16/2020   Medication Sig Dispense Refill    ALPRAZolam (XANAX) 0.25 MG tablet TAKE 1-2 TABLETS BY MOUTH 3 TIMES DAILY AS NEEDED FOR ANXIETY. 30 tablet 2    cetirizine (ZYRTEC) 10 MG tablet Take 1 tablet (10 mg total) by mouth once daily. 30 tablet 5    FLUCELVAX QUAD 8908-6325, PF, 60 mcg (15 mcg x 4)/0.5 mL Syrg TO BE ADMINISTERED BY PHARMACIST FOR IMMUNIZATION  0    fluticasone (FLONASE) 50 mcg/actuation nasal spray 2 " sprays by Each Nare route once daily. 1 Bottle 12    HYDROcodone-acetaminophen (NORCO)  mg per tablet Take 1 tablet by mouth 2 (two) times daily as needed.  0    SUDAFED 12 HOUR 120 mg 12 hr tablet TAKE 1 TABLET (120 MG TOTAL) BY MOUTH 2 (TWO) TIMES DAILY. 60 tablet 5    zolpidem (AMBIEN) 5 MG Tab TAKE 1 TO 2 TABLETS BY MOUTH NIGHTLY AT BEDTIME AS NEEDED 30 tablet 5    promethazine (PHENERGAN) 25 MG tablet Take 1 tablet (25 mg total) by mouth every 4 to 6 hours as needed for Nausea. (Patient not taking: Reported on 1/16/2020) 20 tablet 0     No facility-administered encounter medications on file as of 1/16/2020.      Orders Placed This Encounter   Procedures    CPAP/BIPAP SUPPLIES     Order Specific Question:   Type of mask:     Answer:   Nasal     Order Specific Question:   Headgear?     Answer:   Yes     Order Specific Question:   Tubing?     Answer:   Yes     Order Specific Question:   Humidifier chamber?     Answer:   Yes     Order Specific Question:   Chin strap?     Answer:   Yes     Order Specific Question:   Filters?     Answer:   Yes     Order Specific Question:   Cushions?     Answer:   Yes     Order Specific Question:   Length of need (1-99 months):     Answer:   99     Plan:        Problem List Items Addressed This Visit        Other    SAMIRA (obstructive sleep apnea) - Primary    Overview     Autopap         Current Assessment & Plan     Supply order. Start wearing nightly.          Relevant Orders    CPAP/BIPAP SUPPLIES      Other Visit Diagnoses     Hypersomnia        Poor sleep

## 2020-03-23 RX ORDER — PSEUDOEPHEDRINE HCL 120 MG
TABLET, EXTENDED RELEASE ORAL
Qty: 60 TABLET | Refills: 0 | Status: SHIPPED | OUTPATIENT
Start: 2020-03-23 | End: 2020-06-22 | Stop reason: SDUPTHER

## 2020-04-23 ENCOUNTER — PATIENT MESSAGE (OUTPATIENT)
Dept: FAMILY MEDICINE | Facility: CLINIC | Age: 40
End: 2020-04-23

## 2020-04-23 RX ORDER — ALPRAZOLAM 0.25 MG/1
TABLET ORAL
Qty: 30 TABLET | Refills: 2 | OUTPATIENT
Start: 2020-04-23

## 2020-04-23 RX ORDER — ZOLPIDEM TARTRATE 5 MG/1
TABLET ORAL
Qty: 30 TABLET | OUTPATIENT
Start: 2020-04-23

## 2020-05-02 ENCOUNTER — PATIENT MESSAGE (OUTPATIENT)
Dept: FAMILY MEDICINE | Facility: CLINIC | Age: 40
End: 2020-05-02

## 2020-05-04 ENCOUNTER — OFFICE VISIT (OUTPATIENT)
Dept: FAMILY MEDICINE | Facility: CLINIC | Age: 40
End: 2020-05-04
Payer: COMMERCIAL

## 2020-05-04 VITALS — HEIGHT: 66 IN | WEIGHT: 155 LBS | BODY MASS INDEX: 24.91 KG/M2

## 2020-05-04 DIAGNOSIS — F41.1 GAD (GENERALIZED ANXIETY DISORDER): ICD-10-CM

## 2020-05-04 DIAGNOSIS — F51.01 PRIMARY INSOMNIA: Primary | ICD-10-CM

## 2020-05-04 DIAGNOSIS — J30.9 ALLERGIC RHINITIS, UNSPECIFIED SEASONALITY, UNSPECIFIED TRIGGER: ICD-10-CM

## 2020-05-04 PROCEDURE — 99214 OFFICE O/P EST MOD 30 MIN: CPT | Mod: 95,,, | Performed by: FAMILY MEDICINE

## 2020-05-04 PROCEDURE — 99214 PR OFFICE/OUTPT VISIT, EST, LEVL IV, 30-39 MIN: ICD-10-PCS | Mod: 95,,, | Performed by: FAMILY MEDICINE

## 2020-05-04 RX ORDER — ALPRAZOLAM 0.25 MG/1
TABLET ORAL
Qty: 30 TABLET | Refills: 0 | Status: SHIPPED | OUTPATIENT
Start: 2020-05-04 | End: 2020-05-04 | Stop reason: SDUPTHER

## 2020-05-04 RX ORDER — ALPRAZOLAM 0.25 MG/1
TABLET ORAL
Qty: 30 TABLET | Refills: 3 | Status: SHIPPED | OUTPATIENT
Start: 2020-05-04 | End: 2020-12-10 | Stop reason: SDUPTHER

## 2020-05-04 RX ORDER — ZOLPIDEM TARTRATE 5 MG/1
5 TABLET ORAL NIGHTLY PRN
Qty: 30 TABLET | Refills: 5 | Status: SHIPPED | OUTPATIENT
Start: 2020-05-04 | End: 2020-11-02

## 2020-05-04 NOTE — PROGRESS NOTES
The patient location is: At work  The chief complaint leading to consultation is:  Follow-up chronic medical conditions  Visit type: Audiovisual   Total time spent with patient: 20 min  Each patient to whom he or she provides medical services by telemedicine is:  (1) informed of the relationship between the physician and patient and the respective role of any other health care provider with respect to management of the patient; and (2) notified that he or she may decline to receive medical services by telemedicine and may withdraw from such care at any time.      CHIEF COMPLAINT:  This is a 40-year-old female here for follow-up chronic medical conditions.     SUBJECTIVE:  Patient is doing well without complaints. She continues to have heavy painful periods. She has a history of mild anemia.  She has known uterine fibroid.  She takes alprazolam as needed for anxiety.  She takes zolpidem 5 mg for sleeplessness.  She takes pseudoephedrine and Zyrtec for allergic rhinitis.  The patient often has numbness and discoloration of finger tips especially when exposed to cold.  Patient recently had serology which was positive for herpes simplex 2.  She  reports no past history of herpetic outbreaks.      Eye exam 2019. Pap smear February 2017, due again in February 2020. Mammogram April 2019. Tdap September 2012. Flu vaccine September 2019.     ROS:  GENERAL: Patient denies fever, chills, night sweats. Patient denies weight gain or loss. Patient denies anorexia, fatigue, weakness or swollen glands.  SKIN: Patient denies rash or hair loss.  HEENT: Patient denies sore throat, ear pain, hearing loss, nasal congestion, or runny nose. Patient denies visual disturbance, eye irritation or discharge.  LUNGS: Patient denies cough, wheeze or hemoptysis.  CARDIOVASCULAR: Patient denies chest pain, shortness of breath, palpitations, syncope or lower extremity edema.  GI: Patient denies abdominal pain, nausea, vomiting, diarrhea,  constipation, blood in stool or melena.  GENITOURINARY: Patient denies pelvic pain, vaginal discharge, itch or odor. Patient denies irregular vaginal bleeding. Patient denies dysuria, frequency, hematuria, nocturia, urgency or incontinence.  BREASTS: Patient denies breast pain, mass or nipple discharge.  MUSCULOSKELETAL: Patient denies joint pain, swelling, redness or warmth.  NEUROLOGIC: Patient denies headache, vertigo, paresthesias, weakness in limb, dysarthria, dysphagia or abnormality of gait.  PSYCHIATRIC: Patient denies depression, or memory loss. Positive for insomnia and anxiety.     OBJECTIVE:   GENERAL: Well-developed well-nourished, black female alert and oriented x3 in no acute distress. Memory, judgment and cognition without deficit. No audible wheezing.  Normal affect.  SKIN: Clear without rash. Normal color and tone.  HEENT: Eyes: Clear conjunctivae. No scleral icterus.  Nose:  Not congested-sounding.    NECK: Supple, normal range of motion.  LUNGS:  Normal respiratory effort.  EXTREMITIES:  Normal range of motion in all extremities.  NEUROLOGIC:  Gait without abnormality.  No tremor.    ASSESSMENT:  1. Primary insomnia    2. MAYE (generalized anxiety disorder)    3. Allergic rhinitis, unspecified seasonality, unspecified trigger      PLAN:  1.  Exercise regularly.  2.  Age-appropriate counseling.  3.  Refill alprazolam and zolpidem.  4.  Return to clinic in 3-6 months for preventive health exam/Pap smear and fasting lab.  5.  Patient declines mammogram in 2020.    This visit was 20 min, greater than 50% of which involved counseling.    This note is generated with speech recognition software and is subject to transcription error and sound alike phrases that may be missed by proofreading.

## 2020-05-24 ENCOUNTER — NURSE TRIAGE (OUTPATIENT)
Dept: ADMINISTRATIVE | Facility: CLINIC | Age: 40
End: 2020-05-24

## 2020-05-24 NOTE — TELEPHONE ENCOUNTER
"    Reason for Disposition   [1] SEVERE pain AND [2] not improved 2 hours after pain medicine    Additional Information   Negative: Shock suspected (e.g., cold/pale/clammy skin, too weak to stand, low BP, rapid pulse)   Negative: [1] Similar pain previously AND [2] it was from "heart attack"   Negative: [1] Similar pain previously AND [2] it was from "angina" AND [3] not relieved by nitroglycerin   Negative: Sounds like a life-threatening emergency to the triager   Negative: Followed an arm injury   Negative: Chest pain   Negative: Pain, redness, or swelling at intravenous (IV) site or along course of vein   Negative: Wound looks infected   Negative: Elbow pain is main symptom   Negative: Wrist pain is main symptom   Negative: Difficulty breathing or unusual sweating (e.g., sweating without exertion)   Negative: [1] Age > 40 AND [2] associated chest or jaw pain AND [3] pain lasts > 5 minutes   Negative: [1] Age > 40 AND [2] no obvious cause AND [3] pain even when not moving the arm    (Exception: pain is clearly made worse by moving arm or bending neck)    Protocols used: ARM PAIN-A-AH    Pt stated she has been having severe pain in her right arm. Stated the pain started Wednesday and is worse. Pt stated she been taking 800 mg Ibuprofen with no relief. Per triage protocol, advised to go to Urgent Care or ED within an hour and not to drive. Pt verbalized understanding.  "

## 2020-06-25 RX ORDER — PSEUDOEPHEDRINE HCL 120 MG/1
120 TABLET, FILM COATED, EXTENDED RELEASE ORAL 2 TIMES DAILY
Qty: 60 TABLET | Refills: 3 | Status: SHIPPED | OUTPATIENT
Start: 2020-06-25 | End: 2020-12-10 | Stop reason: SDUPTHER

## 2020-10-06 ENCOUNTER — PATIENT MESSAGE (OUTPATIENT)
Dept: ADMINISTRATIVE | Facility: HOSPITAL | Age: 40
End: 2020-10-06

## 2020-10-29 ENCOUNTER — PATIENT OUTREACH (OUTPATIENT)
Dept: ADMINISTRATIVE | Facility: HOSPITAL | Age: 40
End: 2020-10-29

## 2020-12-07 ENCOUNTER — PATIENT MESSAGE (OUTPATIENT)
Dept: FAMILY MEDICINE | Facility: CLINIC | Age: 40
End: 2020-12-07

## 2020-12-07 RX ORDER — ALPRAZOLAM 0.25 MG/1
TABLET ORAL
Qty: 30 TABLET | OUTPATIENT
Start: 2020-12-07

## 2020-12-07 RX ORDER — ZOLPIDEM TARTRATE 5 MG/1
5 TABLET ORAL NIGHTLY PRN
Qty: 30 TABLET | Refills: 0 | OUTPATIENT
Start: 2020-12-07

## 2020-12-07 RX ORDER — PSEUDOEPHEDRINE HCL 120 MG
TABLET, EXTENDED RELEASE ORAL
Qty: 60 TABLET | Refills: 2 | OUTPATIENT
Start: 2020-12-07

## 2020-12-08 NOTE — TELEPHONE ENCOUNTER
Pt would like to do a virtual for her annual to be able to get her meds refilled. She does not want to come In due to the number of cases that have gone up in covid. Please advise.

## 2020-12-10 ENCOUNTER — OFFICE VISIT (OUTPATIENT)
Dept: FAMILY MEDICINE | Facility: CLINIC | Age: 40
End: 2020-12-10
Payer: COMMERCIAL

## 2020-12-10 VITALS — BODY MASS INDEX: 23.78 KG/M2 | HEIGHT: 66 IN | WEIGHT: 148 LBS

## 2020-12-10 DIAGNOSIS — J30.9 ALLERGIC RHINITIS, UNSPECIFIED SEASONALITY, UNSPECIFIED TRIGGER: ICD-10-CM

## 2020-12-10 DIAGNOSIS — F51.01 PRIMARY INSOMNIA: ICD-10-CM

## 2020-12-10 DIAGNOSIS — D25.9 UTERINE LEIOMYOMA, UNSPECIFIED LOCATION: ICD-10-CM

## 2020-12-10 DIAGNOSIS — F41.1 GAD (GENERALIZED ANXIETY DISORDER): Primary | ICD-10-CM

## 2020-12-10 PROCEDURE — 99214 OFFICE O/P EST MOD 30 MIN: CPT | Mod: 95,,, | Performed by: FAMILY MEDICINE

## 2020-12-10 PROCEDURE — 99214 PR OFFICE/OUTPT VISIT, EST, LEVL IV, 30-39 MIN: ICD-10-PCS | Mod: 95,,, | Performed by: FAMILY MEDICINE

## 2020-12-10 RX ORDER — PSEUDOEPHEDRINE HCL 120 MG/1
120 TABLET, FILM COATED, EXTENDED RELEASE ORAL 2 TIMES DAILY
Qty: 60 TABLET | Refills: 5 | Status: SHIPPED | OUTPATIENT
Start: 2020-12-10 | End: 2021-06-14

## 2020-12-10 RX ORDER — ZOLPIDEM TARTRATE 5 MG/1
5 TABLET ORAL NIGHTLY PRN
Qty: 30 TABLET | Refills: 5 | Status: SHIPPED | OUTPATIENT
Start: 2020-12-10 | End: 2021-07-06

## 2020-12-10 RX ORDER — ALPRAZOLAM 0.25 MG/1
TABLET ORAL
Qty: 30 TABLET | Refills: 3 | Status: SHIPPED | OUTPATIENT
Start: 2020-12-10 | End: 2022-08-12 | Stop reason: SDUPTHER

## 2020-12-10 NOTE — PROGRESS NOTES
The patient location is: At work   The chief complaint leading to consultation is: Follow up chronic medical conditions    Visit type: Audiovisual    Face to Face time with patient:  18 min  25 minutes of total time spent on the encounter, which includes face to face time and non-face to face time preparing to see the patient (eg, review of tests), Obtaining and/or reviewing separately obtained history, Documenting clinical information in the electronic or other health record, Independently interpreting results (not separately reported) and communicating results to the patient/family/caregiver, or Care coordination (not separately reported).     Each patient to whom he or she provides medical services by telemedicine is:  (1) informed of the relationship between the physician and patient and the respective role of any other health care provider with respect to management of the patient; and (2) notified that he or she may decline to receive medical services by telemedicine and may withdraw from such care at any time.      CHIEF COMPLAINT:  This is a 40-year-old female here for follow up of chronic medical conditions.     SUBJECTIVE:  Patient is doing well without complaints. She occasionally uses alprazolam for anxiety.  She takes zolpidem 5 mg for sleeplessness.  She takes pseudoephedrine and Zyrtec for allergic rhinitis.  The patient often has numbness and discoloration of finger tips especially when exposed to cold.  Patient has positive serology for herpes simplex 2.  She  reports no past history of herpetic outbreaks. She continues to have heavy painful periods.  She has known uterine fibroid.   She has a history of mild anemia.     Eye exam 2020. Pap smear February 2017, due again in February 2020. Mammogram April 2019. Tdap September 2012. Flu vaccine October 2020.     ROS:  GENERAL: Patient denies fever, chills, night sweats. Patient denies weight gain or loss. Patient denies anorexia, fatigue, weakness or  swollen glands.  SKIN: Patient denies rash or hair loss.  HEENT: Patient denies sore throat, ear pain, hearing loss, nasal congestion, or runny nose. Patient denies visual disturbance, eye irritation or discharge.  LUNGS: Patient denies cough, wheeze or hemoptysis.  CARDIOVASCULAR: Patient denies chest pain, shortness of breath, palpitations, syncope or lower extremity edema.  GI: Patient denies abdominal pain, nausea, vomiting, diarrhea, constipation, blood in stool or melena.  GENITOURINARY: Patient denies pelvic pain, vaginal discharge, itch or odor. Patient denies irregular vaginal bleeding. Patient denies dysuria, frequency, hematuria, nocturia, urgency or incontinence.  BREASTS: Patient denies breast pain, mass or nipple discharge.  MUSCULOSKELETAL: Patient denies joint pain, swelling, redness or warmth.  NEUROLOGIC: Patient denies headache, vertigo, paresthesias, weakness in limb, dysarthria, dysphagia or abnormality of gait.  PSYCHIATRIC: Patient denies anxiety, depression, or memory loss. Positive for insomnia.     OBJECTIVE:   GENERAL: Well-developed well-nourished, pleasant black female alert and oriented x3 in no acute distress. Memory, judgment and cognition without deficit.  Normal affect.  SKIN: Clear without rash. Normal color and tone.  HEENT: Eyes: Clear conjunctivae. No scleral icterus.  Nose:  Not congested-sounding.    NECK: Supple, normal range of motion.  LUNGS:  Normal respiratory effort.  No audible wheezing.  EXTREMITIES:  Normal range of motion in all extremities.  NEUROLOGIC:  Gait without abnormality.  No tremor.    ASSESSMENT:  1. MAYE (generalized anxiety disorder)    2. Primary insomnia    3. Uterine leiomyoma, unspecified location    4. Allergic rhinitis, unspecified seasonality, unspecified trigger        PLAN:  1.  Exercise regularly.  2.  Age-appropriate counseling.  3.  Fasting lab when patient feels comfortable coming into the clinic.  4.  Mammogram declined at this time.  5.   Refill on medications for 6 months.  6. Follow-up in 6 months for preventive health exam.    This note is generated with speech recognition software and is subject to transcription error and sound alike phrases that may be missed by proofreading.

## 2021-01-06 DIAGNOSIS — Z12.31 OTHER SCREENING MAMMOGRAM: ICD-10-CM

## 2021-03-02 ENCOUNTER — OFFICE VISIT (OUTPATIENT)
Dept: FAMILY MEDICINE | Facility: CLINIC | Age: 41
End: 2021-03-02
Payer: COMMERCIAL

## 2021-03-02 VITALS
HEART RATE: 83 BPM | OXYGEN SATURATION: 99 % | SYSTOLIC BLOOD PRESSURE: 124 MMHG | BODY MASS INDEX: 24.59 KG/M2 | WEIGHT: 153 LBS | HEIGHT: 66 IN | TEMPERATURE: 98 F | DIASTOLIC BLOOD PRESSURE: 82 MMHG

## 2021-03-02 DIAGNOSIS — Z12.31 ENCOUNTER FOR SCREENING MAMMOGRAM FOR MALIGNANT NEOPLASM OF BREAST: ICD-10-CM

## 2021-03-02 DIAGNOSIS — N92.0 MENORRHAGIA WITH REGULAR CYCLE: ICD-10-CM

## 2021-03-02 DIAGNOSIS — I73.00 RAYNAUD'S PHENOMENON WITHOUT GANGRENE: ICD-10-CM

## 2021-03-02 DIAGNOSIS — Z12.4 CERVICAL CANCER SCREENING: ICD-10-CM

## 2021-03-02 DIAGNOSIS — D25.9 UTERINE LEIOMYOMA, UNSPECIFIED LOCATION: ICD-10-CM

## 2021-03-02 DIAGNOSIS — F41.1 GAD (GENERALIZED ANXIETY DISORDER): ICD-10-CM

## 2021-03-02 DIAGNOSIS — Z00.00 PREVENTATIVE HEALTH CARE: Primary | ICD-10-CM

## 2021-03-02 PROCEDURE — 88175 CYTOPATH C/V AUTO FLUID REDO: CPT | Performed by: FAMILY MEDICINE

## 2021-03-02 PROCEDURE — 99396 PR PREVENTIVE VISIT,EST,40-64: ICD-10-PCS | Mod: S$GLB,,, | Performed by: FAMILY MEDICINE

## 2021-03-02 PROCEDURE — 99396 PREV VISIT EST AGE 40-64: CPT | Mod: S$GLB,,, | Performed by: FAMILY MEDICINE

## 2021-03-02 PROCEDURE — 99999 PR PBB SHADOW E&M-EST. PATIENT-LVL III: ICD-10-PCS | Mod: PBBFAC,,, | Performed by: FAMILY MEDICINE

## 2021-03-02 PROCEDURE — 87624 HPV HI-RISK TYP POOLED RSLT: CPT | Performed by: FAMILY MEDICINE

## 2021-03-02 PROCEDURE — 99999 PR PBB SHADOW E&M-EST. PATIENT-LVL III: CPT | Mod: PBBFAC,,, | Performed by: FAMILY MEDICINE

## 2021-03-03 ENCOUNTER — TELEPHONE (OUTPATIENT)
Dept: FAMILY MEDICINE | Facility: CLINIC | Age: 41
End: 2021-03-03

## 2021-03-03 ENCOUNTER — PATIENT MESSAGE (OUTPATIENT)
Dept: FAMILY MEDICINE | Facility: CLINIC | Age: 41
End: 2021-03-03

## 2021-03-06 ENCOUNTER — PATIENT MESSAGE (OUTPATIENT)
Dept: FAMILY MEDICINE | Facility: CLINIC | Age: 41
End: 2021-03-06

## 2021-03-11 LAB
CLINICAL INFO: NORMAL
CYTO CVX: NORMAL
CYTOLOGIST CVX/VAG CYTO: NORMAL
CYTOLOGY CMNT CVX/VAG CYTO-IMP: NORMAL
CYTOLOGY PAP THIN PREP EXPLANATION: NORMAL
DATE OF PREVIOUS PAP: NORMAL
DATE PREVIOUS BX: NO
HPV I/H RISK 4 DNA CVX QL NAA+PROBE: NOT DETECTED
LMP START DATE: NORMAL
SPECIMEN SOURCE CVX/VAG CYTO: NORMAL
STAT OF ADQ CVX/VAG CYTO-IMP: NORMAL

## 2021-03-16 ENCOUNTER — TELEPHONE (OUTPATIENT)
Dept: RADIOLOGY | Facility: HOSPITAL | Age: 41
End: 2021-03-16

## 2021-03-17 ENCOUNTER — HOSPITAL ENCOUNTER (OUTPATIENT)
Dept: RADIOLOGY | Facility: HOSPITAL | Age: 41
Discharge: HOME | End: 2021-03-17
Attending: FAMILY MEDICINE
Payer: COMMERCIAL

## 2021-03-17 VITALS — HEIGHT: 66 IN | WEIGHT: 153 LBS | BODY MASS INDEX: 24.59 KG/M2

## 2021-03-17 DIAGNOSIS — D25.9 UTERINE LEIOMYOMA, UNSPECIFIED LOCATION: ICD-10-CM

## 2021-03-17 DIAGNOSIS — N92.0 MENORRHAGIA WITH REGULAR CYCLE: ICD-10-CM

## 2021-03-17 DIAGNOSIS — Z12.31 ENCOUNTER FOR SCREENING MAMMOGRAM FOR MALIGNANT NEOPLASM OF BREAST: ICD-10-CM

## 2021-03-17 PROCEDURE — 77067 SCR MAMMO BI INCL CAD: CPT | Mod: 26,,, | Performed by: RADIOLOGY

## 2021-03-17 PROCEDURE — 76856 US PELVIS COMP WITH TRANSVAG NON-OB (XPD): ICD-10-PCS | Mod: 26,,, | Performed by: RADIOLOGY

## 2021-03-17 PROCEDURE — 77067 SCR MAMMO BI INCL CAD: CPT | Mod: TC

## 2021-03-17 PROCEDURE — 77063 BREAST TOMOSYNTHESIS BI: CPT | Mod: 26,,, | Performed by: RADIOLOGY

## 2021-03-17 PROCEDURE — 76830 TRANSVAGINAL US NON-OB: CPT | Mod: 26,,, | Performed by: RADIOLOGY

## 2021-03-17 PROCEDURE — 76856 US EXAM PELVIC COMPLETE: CPT | Mod: 26,,, | Performed by: RADIOLOGY

## 2021-03-17 PROCEDURE — 77063 MAMMO DIGITAL SCREENING BILAT WITH TOMO: ICD-10-PCS | Mod: 26,,, | Performed by: RADIOLOGY

## 2021-03-17 PROCEDURE — 76856 US EXAM PELVIC COMPLETE: CPT | Mod: TC

## 2021-03-17 PROCEDURE — 76830 US PELVIS COMP WITH TRANSVAG NON-OB (XPD): ICD-10-PCS | Mod: 26,,, | Performed by: RADIOLOGY

## 2021-03-17 PROCEDURE — 77067 MAMMO DIGITAL SCREENING BILAT WITH TOMO: ICD-10-PCS | Mod: 26,,, | Performed by: RADIOLOGY

## 2021-03-26 ENCOUNTER — PATIENT MESSAGE (OUTPATIENT)
Dept: FAMILY MEDICINE | Facility: CLINIC | Age: 41
End: 2021-03-26

## 2021-06-24 ENCOUNTER — PATIENT MESSAGE (OUTPATIENT)
Dept: FAMILY MEDICINE | Facility: CLINIC | Age: 41
End: 2021-06-24

## 2021-06-30 ENCOUNTER — LAB VISIT (OUTPATIENT)
Dept: LAB | Facility: HOSPITAL | Age: 41
End: 2021-06-30
Attending: OBSTETRICS & GYNECOLOGY
Payer: COMMERCIAL

## 2021-06-30 ENCOUNTER — OFFICE VISIT (OUTPATIENT)
Dept: OBSTETRICS AND GYNECOLOGY | Facility: CLINIC | Age: 41
End: 2021-06-30
Payer: COMMERCIAL

## 2021-06-30 VITALS
HEIGHT: 66 IN | DIASTOLIC BLOOD PRESSURE: 68 MMHG | WEIGHT: 150.56 LBS | SYSTOLIC BLOOD PRESSURE: 110 MMHG | BODY MASS INDEX: 24.2 KG/M2

## 2021-06-30 DIAGNOSIS — D25.1 INTRAMURAL AND SUBMUCOUS LEIOMYOMA OF UTERUS: Primary | ICD-10-CM

## 2021-06-30 DIAGNOSIS — D25.0 INTRAMURAL AND SUBMUCOUS LEIOMYOMA OF UTERUS: Primary | ICD-10-CM

## 2021-06-30 DIAGNOSIS — N92.0 MENORRHAGIA WITH REGULAR CYCLE: ICD-10-CM

## 2021-06-30 DIAGNOSIS — Z91.89 AT RISK FOR FERTILITY PROBLEMS: ICD-10-CM

## 2021-06-30 PROCEDURE — 99999 PR PBB SHADOW E&M-EST. PATIENT-LVL III: ICD-10-PCS | Mod: PBBFAC,,, | Performed by: OBSTETRICS & GYNECOLOGY

## 2021-06-30 PROCEDURE — 36415 COLL VENOUS BLD VENIPUNCTURE: CPT | Performed by: OBSTETRICS & GYNECOLOGY

## 2021-06-30 PROCEDURE — 99204 PR OFFICE/OUTPT VISIT, NEW, LEVL IV, 45-59 MIN: ICD-10-PCS | Mod: 25,S$GLB,, | Performed by: OBSTETRICS & GYNECOLOGY

## 2021-06-30 PROCEDURE — 83520 IMMUNOASSAY QUANT NOS NONAB: CPT | Performed by: OBSTETRICS & GYNECOLOGY

## 2021-06-30 PROCEDURE — 58100 BIOPSY OF UTERUS LINING: CPT | Mod: S$GLB,,, | Performed by: OBSTETRICS & GYNECOLOGY

## 2021-06-30 PROCEDURE — 99999 PR PBB SHADOW E&M-EST. PATIENT-LVL III: CPT | Mod: PBBFAC,,, | Performed by: OBSTETRICS & GYNECOLOGY

## 2021-06-30 PROCEDURE — 88305 TISSUE EXAM BY PATHOLOGIST: ICD-10-PCS | Mod: 26,,, | Performed by: STUDENT IN AN ORGANIZED HEALTH CARE EDUCATION/TRAINING PROGRAM

## 2021-06-30 PROCEDURE — 88305 TISSUE EXAM BY PATHOLOGIST: CPT | Mod: 26,,, | Performed by: STUDENT IN AN ORGANIZED HEALTH CARE EDUCATION/TRAINING PROGRAM

## 2021-06-30 PROCEDURE — 58100 ENDOMETRIAL BIOPSY: ICD-10-PCS | Mod: S$GLB,,, | Performed by: OBSTETRICS & GYNECOLOGY

## 2021-06-30 PROCEDURE — 88305 TISSUE EXAM BY PATHOLOGIST: CPT | Performed by: STUDENT IN AN ORGANIZED HEALTH CARE EDUCATION/TRAINING PROGRAM

## 2021-06-30 PROCEDURE — 99204 OFFICE O/P NEW MOD 45 MIN: CPT | Mod: 25,S$GLB,, | Performed by: OBSTETRICS & GYNECOLOGY

## 2021-07-01 ENCOUNTER — PATIENT MESSAGE (OUTPATIENT)
Dept: OBSTETRICS AND GYNECOLOGY | Facility: CLINIC | Age: 41
End: 2021-07-01

## 2021-07-01 LAB — MIS SERPL-MCNC: 1.9 NG/ML (ref 0.03–5.5)

## 2021-07-06 RX ORDER — ZOLPIDEM TARTRATE 5 MG/1
5 TABLET ORAL NIGHTLY PRN
Qty: 30 TABLET | Refills: 5 | Status: SHIPPED | OUTPATIENT
Start: 2021-07-06 | End: 2022-02-15

## 2021-07-08 LAB
FINAL PATHOLOGIC DIAGNOSIS: NORMAL
GROSS: NORMAL
Lab: NORMAL

## 2021-07-09 ENCOUNTER — TELEPHONE (OUTPATIENT)
Dept: OBSTETRICS AND GYNECOLOGY | Facility: HOSPITAL | Age: 41
End: 2021-07-09

## 2021-07-09 ENCOUNTER — PATIENT MESSAGE (OUTPATIENT)
Dept: OBSTETRICS AND GYNECOLOGY | Facility: HOSPITAL | Age: 41
End: 2021-07-09

## 2021-07-09 ENCOUNTER — PATIENT MESSAGE (OUTPATIENT)
Dept: OBSTETRICS AND GYNECOLOGY | Facility: CLINIC | Age: 41
End: 2021-07-09

## 2021-07-21 ENCOUNTER — PATIENT MESSAGE (OUTPATIENT)
Dept: OBSTETRICS AND GYNECOLOGY | Facility: CLINIC | Age: 41
End: 2021-07-21

## 2021-07-21 ENCOUNTER — OFFICE VISIT (OUTPATIENT)
Dept: OBSTETRICS AND GYNECOLOGY | Facility: CLINIC | Age: 41
End: 2021-07-21
Payer: COMMERCIAL

## 2021-07-21 DIAGNOSIS — D25.1 INTRAMURAL AND SUBMUCOUS LEIOMYOMA OF UTERUS: Primary | ICD-10-CM

## 2021-07-21 DIAGNOSIS — D25.0 INTRAMURAL AND SUBMUCOUS LEIOMYOMA OF UTERUS: Primary | ICD-10-CM

## 2021-07-21 DIAGNOSIS — N92.0 MENORRHAGIA WITH REGULAR CYCLE: ICD-10-CM

## 2021-07-21 PROCEDURE — 99213 OFFICE O/P EST LOW 20 MIN: CPT | Mod: 95,,, | Performed by: OBSTETRICS & GYNECOLOGY

## 2021-07-21 PROCEDURE — 99213 PR OFFICE/OUTPT VISIT, EST, LEVL III, 20-29 MIN: ICD-10-PCS | Mod: 95,,, | Performed by: OBSTETRICS & GYNECOLOGY

## 2021-07-26 ENCOUNTER — PATIENT MESSAGE (OUTPATIENT)
Dept: OBSTETRICS AND GYNECOLOGY | Facility: CLINIC | Age: 41
End: 2021-07-26

## 2021-07-26 DIAGNOSIS — D25.0 INTRAMURAL AND SUBMUCOUS LEIOMYOMA OF UTERUS: Primary | ICD-10-CM

## 2021-07-26 DIAGNOSIS — D25.1 INTRAMURAL AND SUBMUCOUS LEIOMYOMA OF UTERUS: Primary | ICD-10-CM

## 2021-07-27 ENCOUNTER — TELEPHONE (OUTPATIENT)
Dept: OBSTETRICS AND GYNECOLOGY | Facility: CLINIC | Age: 41
End: 2021-07-27

## 2021-07-27 DIAGNOSIS — D25.1 INTRAMURAL AND SUBMUCOUS LEIOMYOMA OF UTERUS: Primary | ICD-10-CM

## 2021-07-27 DIAGNOSIS — N92.0 MENORRHAGIA WITH REGULAR CYCLE: ICD-10-CM

## 2021-07-27 DIAGNOSIS — D25.0 INTRAMURAL AND SUBMUCOUS LEIOMYOMA OF UTERUS: Primary | ICD-10-CM

## 2021-07-30 ENCOUNTER — PATIENT MESSAGE (OUTPATIENT)
Dept: FAMILY MEDICINE | Facility: CLINIC | Age: 41
End: 2021-07-30

## 2021-07-31 ENCOUNTER — LAB VISIT (OUTPATIENT)
Dept: LAB | Facility: HOSPITAL | Age: 41
End: 2021-07-31
Attending: OBSTETRICS & GYNECOLOGY
Payer: COMMERCIAL

## 2021-07-31 DIAGNOSIS — N92.0 MENORRHAGIA WITH REGULAR CYCLE: ICD-10-CM

## 2021-07-31 DIAGNOSIS — I73.00 RAYNAUD'S PHENOMENON WITHOUT GANGRENE: ICD-10-CM

## 2021-07-31 DIAGNOSIS — Z00.00 PREVENTATIVE HEALTH CARE: ICD-10-CM

## 2021-07-31 LAB
ABO + RH BLD: NORMAL
ALBUMIN SERPL BCP-MCNC: 3.5 G/DL (ref 3.5–5.2)
ALP SERPL-CCNC: 55 U/L (ref 55–135)
ALT SERPL W/O P-5'-P-CCNC: 13 U/L (ref 10–44)
ANION GAP SERPL CALC-SCNC: 6 MMOL/L (ref 8–16)
AST SERPL-CCNC: 19 U/L (ref 10–40)
BASOPHILS # BLD AUTO: 0.07 K/UL (ref 0–0.2)
BASOPHILS NFR BLD: 1.1 % (ref 0–1.9)
BILIRUB SERPL-MCNC: 0.5 MG/DL (ref 0.1–1)
BLD GP AB SCN CELLS X3 SERPL QL: NORMAL
BUN SERPL-MCNC: 13 MG/DL (ref 6–20)
CALCIUM SERPL-MCNC: 9.3 MG/DL (ref 8.7–10.5)
CHLORIDE SERPL-SCNC: 104 MMOL/L (ref 95–110)
CHOLEST SERPL-MCNC: 176 MG/DL (ref 120–199)
CHOLEST/HDLC SERPL: 2.7 {RATIO} (ref 2–5)
CO2 SERPL-SCNC: 26 MMOL/L (ref 23–29)
CREAT SERPL-MCNC: 0.8 MG/DL (ref 0.5–1.4)
CRP SERPL-MCNC: 1.5 MG/L (ref 0–8.2)
DIFFERENTIAL METHOD: ABNORMAL
EOSINOPHIL # BLD AUTO: 0.2 K/UL (ref 0–0.5)
EOSINOPHIL NFR BLD: 2.6 % (ref 0–8)
ERYTHROCYTE [DISTWIDTH] IN BLOOD BY AUTOMATED COUNT: 16 % (ref 11.5–14.5)
ERYTHROCYTE [SEDIMENTATION RATE] IN BLOOD BY WESTERGREN METHOD: 27 MM/HR (ref 0–36)
EST. GFR  (AFRICAN AMERICAN): >60 ML/MIN/1.73 M^2
EST. GFR  (NON AFRICAN AMERICAN): >60 ML/MIN/1.73 M^2
GLUCOSE SERPL-MCNC: 87 MG/DL (ref 70–110)
HCT VFR BLD AUTO: 32.2 % (ref 37–48.5)
HDLC SERPL-MCNC: 65 MG/DL (ref 40–75)
HDLC SERPL: 36.9 % (ref 20–50)
HGB BLD-MCNC: 10.1 G/DL (ref 12–16)
IMM GRANULOCYTES # BLD AUTO: 0.01 K/UL (ref 0–0.04)
IMM GRANULOCYTES NFR BLD AUTO: 0.2 % (ref 0–0.5)
LDLC SERPL CALC-MCNC: 104 MG/DL (ref 63–159)
LYMPHOCYTES # BLD AUTO: 2 K/UL (ref 1–4.8)
LYMPHOCYTES NFR BLD: 32.4 % (ref 18–48)
MCH RBC QN AUTO: 26.7 PG (ref 27–31)
MCHC RBC AUTO-ENTMCNC: 31.4 G/DL (ref 32–36)
MCV RBC AUTO: 85 FL (ref 82–98)
MONOCYTES # BLD AUTO: 0.4 K/UL (ref 0.3–1)
MONOCYTES NFR BLD: 5.7 % (ref 4–15)
NEUTROPHILS # BLD AUTO: 3.6 K/UL (ref 1.8–7.7)
NEUTROPHILS NFR BLD: 58 % (ref 38–73)
NONHDLC SERPL-MCNC: 111 MG/DL
NRBC BLD-RTO: 0 /100 WBC
PLATELET # BLD AUTO: 356 K/UL (ref 150–450)
PMV BLD AUTO: 10.6 FL (ref 9.2–12.9)
POTASSIUM SERPL-SCNC: 3.6 MMOL/L (ref 3.5–5.1)
PROT SERPL-MCNC: 7.4 G/DL (ref 6–8.4)
RBC # BLD AUTO: 3.78 M/UL (ref 4–5.4)
RHEUMATOID FACT SERPL-ACNC: <10 IU/ML (ref 0–15)
SODIUM SERPL-SCNC: 136 MMOL/L (ref 136–145)
TRIGL SERPL-MCNC: 35 MG/DL (ref 30–150)
TSH SERPL DL<=0.005 MIU/L-ACNC: 0.66 UIU/ML (ref 0.4–4)
WBC # BLD AUTO: 6.15 K/UL (ref 3.9–12.7)

## 2021-07-31 PROCEDURE — 86140 C-REACTIVE PROTEIN: CPT | Performed by: FAMILY MEDICINE

## 2021-07-31 PROCEDURE — 86431 RHEUMATOID FACTOR QUANT: CPT | Performed by: FAMILY MEDICINE

## 2021-07-31 PROCEDURE — 80053 COMPREHEN METABOLIC PANEL: CPT | Performed by: FAMILY MEDICINE

## 2021-07-31 PROCEDURE — 85025 COMPLETE CBC W/AUTO DIFF WBC: CPT | Performed by: FAMILY MEDICINE

## 2021-07-31 PROCEDURE — 36415 COLL VENOUS BLD VENIPUNCTURE: CPT | Performed by: FAMILY MEDICINE

## 2021-07-31 PROCEDURE — 86038 ANTINUCLEAR ANTIBODIES: CPT | Performed by: FAMILY MEDICINE

## 2021-07-31 PROCEDURE — 86900 BLOOD TYPING SEROLOGIC ABO: CPT | Performed by: OBSTETRICS & GYNECOLOGY

## 2021-07-31 PROCEDURE — 80061 LIPID PANEL: CPT | Performed by: FAMILY MEDICINE

## 2021-07-31 PROCEDURE — 84443 ASSAY THYROID STIM HORMONE: CPT | Performed by: FAMILY MEDICINE

## 2021-07-31 PROCEDURE — 85652 RBC SED RATE AUTOMATED: CPT | Performed by: FAMILY MEDICINE

## 2021-07-31 PROCEDURE — 36415 COLL VENOUS BLD VENIPUNCTURE: CPT | Performed by: OBSTETRICS & GYNECOLOGY

## 2021-08-02 LAB — ANA SER QL IF: NORMAL

## 2021-09-08 ENCOUNTER — PATIENT MESSAGE (OUTPATIENT)
Dept: OBSTETRICS AND GYNECOLOGY | Facility: CLINIC | Age: 41
End: 2021-09-08

## 2021-09-08 ENCOUNTER — TELEPHONE (OUTPATIENT)
Dept: OBSTETRICS AND GYNECOLOGY | Facility: CLINIC | Age: 41
End: 2021-09-08

## 2021-09-08 DIAGNOSIS — Z01.818 PREOP TESTING: Primary | ICD-10-CM

## 2021-10-05 ENCOUNTER — OFFICE VISIT (OUTPATIENT)
Dept: OBSTETRICS AND GYNECOLOGY | Facility: CLINIC | Age: 41
End: 2021-10-05
Payer: COMMERCIAL

## 2021-10-05 ENCOUNTER — LAB VISIT (OUTPATIENT)
Dept: LAB | Facility: HOSPITAL | Age: 41
End: 2021-10-05
Attending: OBSTETRICS & GYNECOLOGY
Payer: COMMERCIAL

## 2021-10-05 VITALS
HEIGHT: 66 IN | SYSTOLIC BLOOD PRESSURE: 114 MMHG | DIASTOLIC BLOOD PRESSURE: 66 MMHG | BODY MASS INDEX: 24.8 KG/M2 | WEIGHT: 154.31 LBS

## 2021-10-05 DIAGNOSIS — D25.0 INTRAMURAL AND SUBMUCOUS LEIOMYOMA OF UTERUS: ICD-10-CM

## 2021-10-05 DIAGNOSIS — N92.0 MENORRHAGIA WITH REGULAR CYCLE: ICD-10-CM

## 2021-10-05 DIAGNOSIS — D25.1 INTRAMURAL AND SUBMUCOUS LEIOMYOMA OF UTERUS: Primary | ICD-10-CM

## 2021-10-05 DIAGNOSIS — D25.1 INTRAMURAL AND SUBMUCOUS LEIOMYOMA OF UTERUS: ICD-10-CM

## 2021-10-05 DIAGNOSIS — D25.0 INTRAMURAL AND SUBMUCOUS LEIOMYOMA OF UTERUS: Primary | ICD-10-CM

## 2021-10-05 LAB
ANION GAP SERPL CALC-SCNC: 7 MMOL/L (ref 8–16)
BASOPHILS # BLD AUTO: 0.07 K/UL (ref 0–0.2)
BASOPHILS NFR BLD: 1 % (ref 0–1.9)
BUN SERPL-MCNC: 12 MG/DL (ref 6–20)
CALCIUM SERPL-MCNC: 9.7 MG/DL (ref 8.7–10.5)
CHLORIDE SERPL-SCNC: 102 MMOL/L (ref 95–110)
CO2 SERPL-SCNC: 27 MMOL/L (ref 23–29)
CREAT SERPL-MCNC: 0.9 MG/DL (ref 0.5–1.4)
DIFFERENTIAL METHOD: ABNORMAL
EOSINOPHIL # BLD AUTO: 0.2 K/UL (ref 0–0.5)
EOSINOPHIL NFR BLD: 2.6 % (ref 0–8)
ERYTHROCYTE [DISTWIDTH] IN BLOOD BY AUTOMATED COUNT: 16.1 % (ref 11.5–14.5)
EST. GFR  (AFRICAN AMERICAN): >60 ML/MIN/1.73 M^2
EST. GFR  (NON AFRICAN AMERICAN): >60 ML/MIN/1.73 M^2
GLUCOSE SERPL-MCNC: 81 MG/DL (ref 70–110)
HCG INTACT+B SERPL-ACNC: <2.4 MIU/ML
HCT VFR BLD AUTO: 37.4 % (ref 37–48.5)
HGB BLD-MCNC: 12.2 G/DL (ref 12–16)
IMM GRANULOCYTES # BLD AUTO: 0.01 K/UL (ref 0–0.04)
IMM GRANULOCYTES NFR BLD AUTO: 0.1 % (ref 0–0.5)
LYMPHOCYTES # BLD AUTO: 2.3 K/UL (ref 1–4.8)
LYMPHOCYTES NFR BLD: 32 % (ref 18–48)
MCH RBC QN AUTO: 28.4 PG (ref 27–31)
MCHC RBC AUTO-ENTMCNC: 32.6 G/DL (ref 32–36)
MCV RBC AUTO: 87 FL (ref 82–98)
MONOCYTES # BLD AUTO: 0.6 K/UL (ref 0.3–1)
MONOCYTES NFR BLD: 8.1 % (ref 4–15)
NEUTROPHILS # BLD AUTO: 4 K/UL (ref 1.8–7.7)
NEUTROPHILS NFR BLD: 56.2 % (ref 38–73)
NRBC BLD-RTO: 0 /100 WBC
PLATELET # BLD AUTO: 324 K/UL (ref 150–450)
PMV BLD AUTO: 10.6 FL (ref 9.2–12.9)
POTASSIUM SERPL-SCNC: 4.1 MMOL/L (ref 3.5–5.1)
RBC # BLD AUTO: 4.3 M/UL (ref 4–5.4)
SODIUM SERPL-SCNC: 136 MMOL/L (ref 136–145)
WBC # BLD AUTO: 7.03 K/UL (ref 3.9–12.7)

## 2021-10-05 PROCEDURE — 99499 NO LOS: ICD-10-PCS | Mod: S$GLB,,, | Performed by: OBSTETRICS & GYNECOLOGY

## 2021-10-05 PROCEDURE — 99499 UNLISTED E&M SERVICE: CPT | Mod: S$GLB,,, | Performed by: OBSTETRICS & GYNECOLOGY

## 2021-10-05 PROCEDURE — 99999 PR PBB SHADOW E&M-EST. PATIENT-LVL III: ICD-10-PCS | Mod: PBBFAC,,, | Performed by: OBSTETRICS & GYNECOLOGY

## 2021-10-05 PROCEDURE — 80048 BASIC METABOLIC PNL TOTAL CA: CPT | Performed by: OBSTETRICS & GYNECOLOGY

## 2021-10-05 PROCEDURE — 85025 COMPLETE CBC W/AUTO DIFF WBC: CPT | Performed by: OBSTETRICS & GYNECOLOGY

## 2021-10-05 PROCEDURE — 99999 PR PBB SHADOW E&M-EST. PATIENT-LVL III: CPT | Mod: PBBFAC,,, | Performed by: OBSTETRICS & GYNECOLOGY

## 2021-10-05 PROCEDURE — 36415 COLL VENOUS BLD VENIPUNCTURE: CPT | Performed by: OBSTETRICS & GYNECOLOGY

## 2021-10-05 PROCEDURE — 84702 CHORIONIC GONADOTROPIN TEST: CPT | Performed by: OBSTETRICS & GYNECOLOGY

## 2021-10-05 RX ORDER — SODIUM CHLORIDE 9 MG/ML
INJECTION, SOLUTION INTRAVENOUS CONTINUOUS
Status: CANCELLED | OUTPATIENT
Start: 2021-10-05

## 2021-10-07 ENCOUNTER — PATIENT MESSAGE (OUTPATIENT)
Dept: SURGERY | Facility: HOSPITAL | Age: 41
End: 2021-10-07

## 2021-10-08 ENCOUNTER — ANESTHESIA EVENT (OUTPATIENT)
Dept: SURGERY | Facility: HOSPITAL | Age: 41
DRG: 743 | End: 2021-10-08
Payer: COMMERCIAL

## 2021-10-08 ENCOUNTER — LAB VISIT (OUTPATIENT)
Dept: PRIMARY CARE CLINIC | Facility: CLINIC | Age: 41
End: 2021-10-08
Payer: COMMERCIAL

## 2021-10-08 DIAGNOSIS — Z01.818 PREOP TESTING: ICD-10-CM

## 2021-10-08 PROCEDURE — U0005 INFEC AGEN DETEC AMPLI PROBE: HCPCS | Performed by: OBSTETRICS & GYNECOLOGY

## 2021-10-08 PROCEDURE — U0003 INFECTIOUS AGENT DETECTION BY NUCLEIC ACID (DNA OR RNA); SEVERE ACUTE RESPIRATORY SYNDROME CORONAVIRUS 2 (SARS-COV-2) (CORONAVIRUS DISEASE [COVID-19]), AMPLIFIED PROBE TECHNIQUE, MAKING USE OF HIGH THROUGHPUT TECHNOLOGIES AS DESCRIBED BY CMS-2020-01-R: HCPCS | Performed by: OBSTETRICS & GYNECOLOGY

## 2021-10-09 LAB
SARS-COV-2 RNA RESP QL NAA+PROBE: NOT DETECTED
SARS-COV-2- CYCLE NUMBER: NORMAL

## 2021-10-11 ENCOUNTER — HOSPITAL ENCOUNTER (INPATIENT)
Facility: HOSPITAL | Age: 41
LOS: 2 days | Discharge: HOME | DRG: 743 | End: 2021-10-13
Attending: OBSTETRICS & GYNECOLOGY | Admitting: OBSTETRICS & GYNECOLOGY
Payer: COMMERCIAL

## 2021-10-11 ENCOUNTER — ANESTHESIA (OUTPATIENT)
Dept: SURGERY | Facility: HOSPITAL | Age: 41
DRG: 743 | End: 2021-10-11
Payer: COMMERCIAL

## 2021-10-11 DIAGNOSIS — Z98.890 S/P MYOMECTOMY: Primary | ICD-10-CM

## 2021-10-11 DIAGNOSIS — D25.0 INTRAMURAL AND SUBMUCOUS LEIOMYOMA OF UTERUS: ICD-10-CM

## 2021-10-11 DIAGNOSIS — D25.1 INTRAMURAL AND SUBMUCOUS LEIOMYOMA OF UTERUS: ICD-10-CM

## 2021-10-11 DIAGNOSIS — N92.0 MENORRHAGIA WITH REGULAR CYCLE: ICD-10-CM

## 2021-10-11 LAB
ABO + RH BLD: NORMAL
BLD GP AB SCN CELLS X3 SERPL QL: NORMAL
POCT GLUCOSE: 118 MG/DL (ref 70–110)
POCT GLUCOSE: 47 MG/DL (ref 70–110)
POCT GLUCOSE: 94 MG/DL (ref 70–110)
POCT GLUCOSE: <20 MG/DL (ref 70–110)

## 2021-10-11 PROCEDURE — 99900035 HC TECH TIME PER 15 MIN (STAT)

## 2021-10-11 PROCEDURE — 63600175 PHARM REV CODE 636 W HCPCS: Performed by: OBSTETRICS & GYNECOLOGY

## 2021-10-11 PROCEDURE — 25000003 PHARM REV CODE 250: Performed by: NURSE ANESTHETIST, CERTIFIED REGISTERED

## 2021-10-11 PROCEDURE — 63600175 PHARM REV CODE 636 W HCPCS: Performed by: ANESTHESIOLOGY

## 2021-10-11 PROCEDURE — 21400001 HC TELEMETRY ROOM

## 2021-10-11 PROCEDURE — 58146 MYOMECTOMY ABDOM COMPLEX: CPT | Mod: ,,, | Performed by: OBSTETRICS & GYNECOLOGY

## 2021-10-11 PROCEDURE — 94799 UNLISTED PULMONARY SVC/PX: CPT

## 2021-10-11 PROCEDURE — 36000706: Performed by: OBSTETRICS & GYNECOLOGY

## 2021-10-11 PROCEDURE — 71000039 HC RECOVERY, EACH ADD'L HOUR: Performed by: OBSTETRICS & GYNECOLOGY

## 2021-10-11 PROCEDURE — 88305 TISSUE EXAM BY PATHOLOGIST: CPT | Mod: 26,,, | Performed by: PATHOLOGY

## 2021-10-11 PROCEDURE — 63600175 PHARM REV CODE 636 W HCPCS: Performed by: NURSE ANESTHETIST, CERTIFIED REGISTERED

## 2021-10-11 PROCEDURE — 27000221 HC OXYGEN, UP TO 24 HOURS

## 2021-10-11 PROCEDURE — 25000003 PHARM REV CODE 250: Performed by: OBSTETRICS & GYNECOLOGY

## 2021-10-11 PROCEDURE — 71000033 HC RECOVERY, INTIAL HOUR: Performed by: OBSTETRICS & GYNECOLOGY

## 2021-10-11 PROCEDURE — 36000707: Performed by: OBSTETRICS & GYNECOLOGY

## 2021-10-11 PROCEDURE — 94761 N-INVAS EAR/PLS OXIMETRY MLT: CPT

## 2021-10-11 PROCEDURE — 86900 BLOOD TYPING SEROLOGIC ABO: CPT | Performed by: OBSTETRICS & GYNECOLOGY

## 2021-10-11 PROCEDURE — 37000008 HC ANESTHESIA 1ST 15 MINUTES: Performed by: OBSTETRICS & GYNECOLOGY

## 2021-10-11 PROCEDURE — 25000003 PHARM REV CODE 250: Performed by: ANESTHESIOLOGY

## 2021-10-11 PROCEDURE — 88305 TISSUE EXAM BY PATHOLOGIST: ICD-10-PCS | Mod: 26,,, | Performed by: PATHOLOGY

## 2021-10-11 PROCEDURE — 37000009 HC ANESTHESIA EA ADD 15 MINS: Performed by: OBSTETRICS & GYNECOLOGY

## 2021-10-11 PROCEDURE — 58146 PR MYOMECTOMY 5/>,TOT>250 GMS,ABD APPRCH: ICD-10-PCS | Mod: ,,, | Performed by: OBSTETRICS & GYNECOLOGY

## 2021-10-11 PROCEDURE — C1765 ADHESION BARRIER: HCPCS | Performed by: OBSTETRICS & GYNECOLOGY

## 2021-10-11 PROCEDURE — 88305 TISSUE EXAM BY PATHOLOGIST: CPT | Performed by: PATHOLOGY

## 2021-10-11 DEVICE — BARRIER INTERCEED 3X4 ST DIS: Type: IMPLANTABLE DEVICE | Site: ABDOMEN | Status: FUNCTIONAL

## 2021-10-11 RX ORDER — OXYCODONE AND ACETAMINOPHEN 10; 325 MG/1; MG/1
1 TABLET ORAL EVERY 4 HOURS PRN
Status: DISCONTINUED | OUTPATIENT
Start: 2021-10-11 | End: 2021-10-13 | Stop reason: HOSPADM

## 2021-10-11 RX ORDER — NEOSTIGMINE METHYLSULFATE 1 MG/ML
INJECTION, SOLUTION INTRAVENOUS
Status: DISCONTINUED | OUTPATIENT
Start: 2021-10-11 | End: 2021-10-11

## 2021-10-11 RX ORDER — DIPHENHYDRAMINE HYDROCHLORIDE 50 MG/ML
25 INJECTION INTRAMUSCULAR; INTRAVENOUS EVERY 6 HOURS PRN
Status: DISCONTINUED | OUTPATIENT
Start: 2021-10-11 | End: 2021-10-11

## 2021-10-11 RX ORDER — SODIUM CHLORIDE 0.9 G/100ML
3000 IRRIGANT IRRIGATION CONTINUOUS
Status: CANCELLED | OUTPATIENT
Start: 2021-10-11

## 2021-10-11 RX ORDER — CHLORHEXIDINE GLUCONATE ORAL RINSE 1.2 MG/ML
10 SOLUTION DENTAL
Status: DISCONTINUED | OUTPATIENT
Start: 2021-10-11 | End: 2021-10-11

## 2021-10-11 RX ORDER — BISACODYL 10 MG
10 SUPPOSITORY, RECTAL RECTAL DAILY PRN
Status: DISCONTINUED | OUTPATIENT
Start: 2021-10-11 | End: 2021-10-13 | Stop reason: HOSPADM

## 2021-10-11 RX ORDER — PROCHLORPERAZINE EDISYLATE 5 MG/ML
5 INJECTION INTRAMUSCULAR; INTRAVENOUS EVERY 6 HOURS PRN
Status: DISCONTINUED | OUTPATIENT
Start: 2021-10-11 | End: 2021-10-13 | Stop reason: HOSPADM

## 2021-10-11 RX ORDER — MEPERIDINE HYDROCHLORIDE 25 MG/ML
12.5 INJECTION INTRAMUSCULAR; INTRAVENOUS; SUBCUTANEOUS ONCE AS NEEDED
Status: DISCONTINUED | OUTPATIENT
Start: 2021-10-11 | End: 2021-10-11

## 2021-10-11 RX ORDER — IBUPROFEN 600 MG/1
600 TABLET ORAL EVERY 6 HOURS
Status: DISCONTINUED | OUTPATIENT
Start: 2021-10-12 | End: 2021-10-12

## 2021-10-11 RX ORDER — ALPRAZOLAM 0.5 MG/1
0.5 TABLET ORAL 2 TIMES DAILY PRN
Status: DISCONTINUED | OUTPATIENT
Start: 2021-10-11 | End: 2021-10-13 | Stop reason: HOSPADM

## 2021-10-11 RX ORDER — CEFAZOLIN SODIUM 2 G/50ML
2 SOLUTION INTRAVENOUS
Status: DISCONTINUED | OUTPATIENT
Start: 2021-10-11 | End: 2021-10-11

## 2021-10-11 RX ORDER — OXYCODONE AND ACETAMINOPHEN 5; 325 MG/1; MG/1
1 TABLET ORAL EVERY 4 HOURS PRN
Status: DISCONTINUED | OUTPATIENT
Start: 2021-10-11 | End: 2021-10-13 | Stop reason: HOSPADM

## 2021-10-11 RX ORDER — MORPHINE SULFATE 4 MG/ML
2 INJECTION, SOLUTION INTRAMUSCULAR; INTRAVENOUS EVERY 5 MIN PRN
Status: DISCONTINUED | OUTPATIENT
Start: 2021-10-11 | End: 2021-10-11 | Stop reason: HOSPADM

## 2021-10-11 RX ORDER — PSEUDOEPHEDRINE HCL 120 MG/1
120 TABLET, FILM COATED, EXTENDED RELEASE ORAL 2 TIMES DAILY
Status: DISCONTINUED | OUTPATIENT
Start: 2021-10-11 | End: 2021-10-13 | Stop reason: HOSPADM

## 2021-10-11 RX ORDER — ONDANSETRON 8 MG/1
8 TABLET, ORALLY DISINTEGRATING ORAL EVERY 8 HOURS PRN
Status: DISCONTINUED | OUTPATIENT
Start: 2021-10-11 | End: 2021-10-13 | Stop reason: HOSPADM

## 2021-10-11 RX ORDER — NALOXONE HCL 0.4 MG/ML
0.2 VIAL (ML) INJECTION
Status: DISCONTINUED | OUTPATIENT
Start: 2021-10-11 | End: 2021-10-13 | Stop reason: HOSPADM

## 2021-10-11 RX ORDER — ROCURONIUM BROMIDE 10 MG/ML
INJECTION, SOLUTION INTRAVENOUS
Status: DISCONTINUED | OUTPATIENT
Start: 2021-10-11 | End: 2021-10-11

## 2021-10-11 RX ORDER — MORPHINE SULFATE 2 MG/ML
2 INJECTION, SOLUTION INTRAMUSCULAR; INTRAVENOUS EVERY 5 MIN PRN
Status: DISCONTINUED | OUTPATIENT
Start: 2021-10-11 | End: 2021-10-11

## 2021-10-11 RX ORDER — DIPHENHYDRAMINE HCL 25 MG
25 CAPSULE ORAL EVERY 4 HOURS PRN
Status: DISCONTINUED | OUTPATIENT
Start: 2021-10-11 | End: 2021-10-13 | Stop reason: HOSPADM

## 2021-10-11 RX ORDER — METOCLOPRAMIDE HYDROCHLORIDE 5 MG/ML
10 INJECTION INTRAMUSCULAR; INTRAVENOUS EVERY 10 MIN PRN
Status: DISCONTINUED | OUTPATIENT
Start: 2021-10-11 | End: 2021-10-11

## 2021-10-11 RX ORDER — SODIUM CHLORIDE 9 MG/ML
INJECTION, SOLUTION INTRAVENOUS CONTINUOUS
Status: DISCONTINUED | OUTPATIENT
Start: 2021-10-11 | End: 2021-10-11

## 2021-10-11 RX ORDER — SODIUM CHLORIDE 0.9 % (FLUSH) 0.9 %
3 SYRINGE (ML) INJECTION EVERY 8 HOURS
Status: DISCONTINUED | OUTPATIENT
Start: 2021-10-11 | End: 2021-10-11

## 2021-10-11 RX ORDER — CHLORHEXIDINE GLUCONATE ORAL RINSE 1.2 MG/ML
10 SOLUTION DENTAL 2 TIMES DAILY
Status: DISCONTINUED | OUTPATIENT
Start: 2021-10-11 | End: 2021-10-13 | Stop reason: HOSPADM

## 2021-10-11 RX ORDER — HYDROMORPHONE HCL IN 0.9% NACL 6 MG/30 ML
PATIENT CONTROLLED ANALGESIA SYRINGE INTRAVENOUS CONTINUOUS
Status: DISCONTINUED | OUTPATIENT
Start: 2021-10-11 | End: 2021-10-11

## 2021-10-11 RX ORDER — ONDANSETRON 2 MG/ML
4 INJECTION INTRAMUSCULAR; INTRAVENOUS EVERY 12 HOURS PRN
Status: DISCONTINUED | OUTPATIENT
Start: 2021-10-11 | End: 2021-10-13 | Stop reason: HOSPADM

## 2021-10-11 RX ORDER — KETOROLAC TROMETHAMINE 30 MG/ML
30 INJECTION, SOLUTION INTRAMUSCULAR; INTRAVENOUS EVERY 8 HOURS
Status: COMPLETED | OUTPATIENT
Start: 2021-10-11 | End: 2021-10-12

## 2021-10-11 RX ORDER — CEFAZOLIN SODIUM 1 G/3ML
INJECTION, POWDER, FOR SOLUTION INTRAMUSCULAR; INTRAVENOUS
Status: DISCONTINUED | OUTPATIENT
Start: 2021-10-11 | End: 2021-10-11

## 2021-10-11 RX ORDER — FENTANYL CITRATE 50 UG/ML
INJECTION, SOLUTION INTRAMUSCULAR; INTRAVENOUS
Status: DISCONTINUED | OUTPATIENT
Start: 2021-10-11 | End: 2021-10-11

## 2021-10-11 RX ORDER — LIDOCAINE HYDROCHLORIDE 20 MG/ML
INJECTION, SOLUTION EPIDURAL; INFILTRATION; INTRACAUDAL; PERINEURAL
Status: DISCONTINUED | OUTPATIENT
Start: 2021-10-11 | End: 2021-10-11

## 2021-10-11 RX ORDER — ZOLPIDEM TARTRATE 5 MG/1
5 TABLET ORAL NIGHTLY PRN
Status: DISCONTINUED | OUTPATIENT
Start: 2021-10-11 | End: 2021-10-13 | Stop reason: HOSPADM

## 2021-10-11 RX ORDER — DEXAMETHASONE SODIUM PHOSPHATE 4 MG/ML
INJECTION, SOLUTION INTRA-ARTICULAR; INTRALESIONAL; INTRAMUSCULAR; INTRAVENOUS; SOFT TISSUE
Status: DISCONTINUED | OUTPATIENT
Start: 2021-10-11 | End: 2021-10-11

## 2021-10-11 RX ORDER — MIDAZOLAM HYDROCHLORIDE 1 MG/ML
INJECTION INTRAMUSCULAR; INTRAVENOUS
Status: DISCONTINUED | OUTPATIENT
Start: 2021-10-11 | End: 2021-10-11

## 2021-10-11 RX ORDER — DIPHENHYDRAMINE HYDROCHLORIDE 50 MG/ML
25 INJECTION INTRAMUSCULAR; INTRAVENOUS EVERY 4 HOURS PRN
Status: DISCONTINUED | OUTPATIENT
Start: 2021-10-11 | End: 2021-10-13 | Stop reason: HOSPADM

## 2021-10-11 RX ORDER — ONDANSETRON 2 MG/ML
INJECTION INTRAMUSCULAR; INTRAVENOUS
Status: DISCONTINUED | OUTPATIENT
Start: 2021-10-11 | End: 2021-10-11

## 2021-10-11 RX ORDER — HYDROMORPHONE HYDROCHLORIDE 2 MG/ML
0.2 INJECTION, SOLUTION INTRAMUSCULAR; INTRAVENOUS; SUBCUTANEOUS EVERY 5 MIN PRN
Status: COMPLETED | OUTPATIENT
Start: 2021-10-11 | End: 2021-10-11

## 2021-10-11 RX ORDER — CEFAZOLIN SODIUM 2 G/50ML
2 SOLUTION INTRAVENOUS
Status: DISCONTINUED | OUTPATIENT
Start: 2021-10-11 | End: 2021-10-11 | Stop reason: SDUPTHER

## 2021-10-11 RX ORDER — VASOPRESSIN 20 [USP'U]/ML
INJECTION, SOLUTION INTRAMUSCULAR; SUBCUTANEOUS
Status: DISCONTINUED | OUTPATIENT
Start: 2021-10-11 | End: 2021-10-11 | Stop reason: HOSPADM

## 2021-10-11 RX ORDER — HYDROMORPHONE HYDROCHLORIDE 2 MG/ML
0.2 INJECTION, SOLUTION INTRAMUSCULAR; INTRAVENOUS; SUBCUTANEOUS EVERY 5 MIN PRN
Status: CANCELLED | OUTPATIENT
Start: 2021-10-11

## 2021-10-11 RX ORDER — CETIRIZINE HYDROCHLORIDE 10 MG/1
10 TABLET ORAL DAILY
Status: DISCONTINUED | OUTPATIENT
Start: 2021-10-12 | End: 2021-10-11

## 2021-10-11 RX ORDER — HYDROMORPHONE HYDROCHLORIDE 2 MG/ML
0.2 INJECTION, SOLUTION INTRAMUSCULAR; INTRAVENOUS; SUBCUTANEOUS EVERY 5 MIN PRN
Status: DISCONTINUED | OUTPATIENT
Start: 2021-10-11 | End: 2021-10-11

## 2021-10-11 RX ORDER — CETIRIZINE HYDROCHLORIDE 10 MG/1
10 TABLET ORAL NIGHTLY
Status: DISCONTINUED | OUTPATIENT
Start: 2021-10-11 | End: 2021-10-13 | Stop reason: HOSPADM

## 2021-10-11 RX ORDER — PROPOFOL 10 MG/ML
VIAL (ML) INTRAVENOUS
Status: DISCONTINUED | OUTPATIENT
Start: 2021-10-11 | End: 2021-10-11

## 2021-10-11 RX ORDER — KETOROLAC TROMETHAMINE 30 MG/ML
INJECTION, SOLUTION INTRAMUSCULAR; INTRAVENOUS
Status: DISCONTINUED | OUTPATIENT
Start: 2021-10-11 | End: 2021-10-11

## 2021-10-11 RX ORDER — SODIUM CHLORIDE, SODIUM LACTATE, POTASSIUM CHLORIDE, CALCIUM CHLORIDE 600; 310; 30; 20 MG/100ML; MG/100ML; MG/100ML; MG/100ML
INJECTION, SOLUTION INTRAVENOUS CONTINUOUS
Status: DISCONTINUED | OUTPATIENT
Start: 2021-10-11 | End: 2021-10-11

## 2021-10-11 RX ORDER — KETAMINE HYDROCHLORIDE 10 MG/ML
INJECTION, SOLUTION INTRAMUSCULAR; INTRAVENOUS
Status: DISCONTINUED | OUTPATIENT
Start: 2021-10-11 | End: 2021-10-11

## 2021-10-11 RX ORDER — HYDROMORPHONE HCL IN 0.9% NACL 6 MG/30 ML
PATIENT CONTROLLED ANALGESIA SYRINGE INTRAVENOUS CONTINUOUS
Status: DISCONTINUED | OUTPATIENT
Start: 2021-10-11 | End: 2021-10-12

## 2021-10-11 RX ORDER — MUPIROCIN 20 MG/G
OINTMENT TOPICAL 2 TIMES DAILY
Status: DISCONTINUED | OUTPATIENT
Start: 2021-10-11 | End: 2021-10-13 | Stop reason: HOSPADM

## 2021-10-11 RX ADMIN — MIDAZOLAM HYDROCHLORIDE 2 MG: 1 INJECTION, SOLUTION INTRAMUSCULAR; INTRAVENOUS at 11:10

## 2021-10-11 RX ADMIN — FENTANYL CITRATE 25 MCG: 50 INJECTION, SOLUTION INTRAMUSCULAR; INTRAVENOUS at 11:10

## 2021-10-11 RX ADMIN — HYDROMORPHONE HYDROCHLORIDE 0.2 MG: 2 INJECTION INTRAMUSCULAR; INTRAVENOUS; SUBCUTANEOUS at 01:10

## 2021-10-11 RX ADMIN — ONDANSETRON 4 MG: 2 INJECTION, SOLUTION INTRAMUSCULAR; INTRAVENOUS at 01:10

## 2021-10-11 RX ADMIN — CHLORHEXIDINE GLUCONATE 10 ML: 1.2 RINSE ORAL at 10:10

## 2021-10-11 RX ADMIN — HYDROMORPHONE HYDROCHLORIDE 0.2 MG: 2 INJECTION INTRAMUSCULAR; INTRAVENOUS; SUBCUTANEOUS at 02:10

## 2021-10-11 RX ADMIN — SODIUM CHLORIDE, SODIUM LACTATE, POTASSIUM CHLORIDE, AND CALCIUM CHLORIDE: 600; 310; 30; 20 INJECTION, SOLUTION INTRAVENOUS at 12:10

## 2021-10-11 RX ADMIN — PROPOFOL 150 MG: 10 INJECTION, EMULSION INTRAVENOUS at 11:10

## 2021-10-11 RX ADMIN — CETIRIZINE HYDROCHLORIDE 10 MG: 10 TABLET, FILM COATED ORAL at 11:10

## 2021-10-11 RX ADMIN — KETAMINE HYDROCHLORIDE 20 MG: 10 INJECTION, SOLUTION INTRAMUSCULAR; INTRAVENOUS at 11:10

## 2021-10-11 RX ADMIN — ROCURONIUM BROMIDE 10 MG: 10 INJECTION, SOLUTION INTRAVENOUS at 12:10

## 2021-10-11 RX ADMIN — LIDOCAINE HYDROCHLORIDE 100 MG: 20 INJECTION, SOLUTION EPIDURAL; INFILTRATION; INTRACAUDAL; PERINEURAL at 11:10

## 2021-10-11 RX ADMIN — FENTANYL CITRATE 50 MCG: 50 INJECTION, SOLUTION INTRAMUSCULAR; INTRAVENOUS at 01:10

## 2021-10-11 RX ADMIN — ROCURONIUM BROMIDE 40 MG: 10 INJECTION, SOLUTION INTRAVENOUS at 11:10

## 2021-10-11 RX ADMIN — NEOSTIGMINE METHYLSULFATE 5 MG: 1 INJECTION INTRAVENOUS at 01:10

## 2021-10-11 RX ADMIN — KETOROLAC TROMETHAMINE 30 MG: 30 INJECTION, SOLUTION INTRAMUSCULAR at 10:10

## 2021-10-11 RX ADMIN — MORPHINE SULFATE 2 MG: 4 INJECTION INTRAVENOUS at 03:10

## 2021-10-11 RX ADMIN — DEXAMETHASONE SODIUM PHOSPHATE 4 MG: 4 INJECTION, SOLUTION INTRAMUSCULAR; INTRAVENOUS at 11:10

## 2021-10-11 RX ADMIN — DEXTROSE MONOHYDRATE 6.5 G: 500 INJECTION PARENTERAL at 11:10

## 2021-10-11 RX ADMIN — KETOROLAC TROMETHAMINE 30 MG: 30 INJECTION, SOLUTION INTRAMUSCULAR at 01:10

## 2021-10-11 RX ADMIN — FENTANYL CITRATE 25 MCG: 50 INJECTION, SOLUTION INTRAMUSCULAR; INTRAVENOUS at 12:10

## 2021-10-11 RX ADMIN — SODIUM CHLORIDE, SODIUM LACTATE, POTASSIUM CHLORIDE, AND CALCIUM CHLORIDE: 600; 310; 30; 20 INJECTION, SOLUTION INTRAVENOUS at 11:10

## 2021-10-11 RX ADMIN — PROPOFOL 50 MG: 10 INJECTION, EMULSION INTRAVENOUS at 12:10

## 2021-10-11 RX ADMIN — CEFAZOLIN 2 G: 1 INJECTION, POWDER, FOR SOLUTION INTRAMUSCULAR; INTRAVENOUS at 11:10

## 2021-10-11 RX ADMIN — GLYCOPYRROLATE 0.6 MG: 0.2 INJECTION, SOLUTION INTRAMUSCULAR; INTRAVENOUS at 01:10

## 2021-10-11 RX ADMIN — PSEUDOEPHEDRINE HCL 120 MG: 120 TABLET, FILM COATED, EXTENDED RELEASE ORAL at 11:10

## 2021-10-11 RX ADMIN — MUPIROCIN: 20 OINTMENT TOPICAL at 10:10

## 2021-10-11 RX ADMIN — Medication: at 04:10

## 2021-10-11 RX ADMIN — ONDANSETRON 4 MG: 2 INJECTION, SOLUTION INTRAMUSCULAR; INTRAVENOUS at 11:10

## 2021-10-12 PROCEDURE — 25000003 PHARM REV CODE 250: Performed by: OBSTETRICS & GYNECOLOGY

## 2021-10-12 PROCEDURE — 94799 UNLISTED PULMONARY SVC/PX: CPT

## 2021-10-12 PROCEDURE — 63600175 PHARM REV CODE 636 W HCPCS: Performed by: ANESTHESIOLOGY

## 2021-10-12 PROCEDURE — 27000221 HC OXYGEN, UP TO 24 HOURS

## 2021-10-12 PROCEDURE — 99900035 HC TECH TIME PER 15 MIN (STAT)

## 2021-10-12 PROCEDURE — 94761 N-INVAS EAR/PLS OXIMETRY MLT: CPT

## 2021-10-12 PROCEDURE — 63600175 PHARM REV CODE 636 W HCPCS: Performed by: OBSTETRICS & GYNECOLOGY

## 2021-10-12 PROCEDURE — 21400001 HC TELEMETRY ROOM

## 2021-10-12 RX ORDER — KETOROLAC TROMETHAMINE 30 MG/ML
30 INJECTION, SOLUTION INTRAMUSCULAR; INTRAVENOUS EVERY 8 HOURS
Status: DISCONTINUED | OUTPATIENT
Start: 2021-10-12 | End: 2021-10-12

## 2021-10-12 RX ORDER — IBUPROFEN 400 MG/1
800 TABLET ORAL EVERY 8 HOURS
Status: DISCONTINUED | OUTPATIENT
Start: 2021-10-13 | End: 2021-10-12

## 2021-10-12 RX ADMIN — ONDANSETRON 4 MG: 2 INJECTION INTRAMUSCULAR; INTRAVENOUS at 01:10

## 2021-10-12 RX ADMIN — OXYCODONE AND ACETAMINOPHEN 1 TABLET: 10; 325 TABLET ORAL at 08:10

## 2021-10-12 RX ADMIN — ALPRAZOLAM 0.5 MG: 0.5 TABLET ORAL at 01:10

## 2021-10-12 RX ADMIN — PSEUDOEPHEDRINE HCL 120 MG: 120 TABLET, FILM COATED, EXTENDED RELEASE ORAL at 11:10

## 2021-10-12 RX ADMIN — OXYCODONE AND ACETAMINOPHEN 1 TABLET: 10; 325 TABLET ORAL at 09:10

## 2021-10-12 RX ADMIN — MUPIROCIN: 20 OINTMENT TOPICAL at 08:10

## 2021-10-12 RX ADMIN — MUPIROCIN: 20 OINTMENT TOPICAL at 09:10

## 2021-10-12 RX ADMIN — OXYCODONE AND ACETAMINOPHEN 1 TABLET: 10; 325 TABLET ORAL at 12:10

## 2021-10-12 RX ADMIN — KETOROLAC TROMETHAMINE 30 MG: 30 INJECTION, SOLUTION INTRAMUSCULAR at 05:10

## 2021-10-12 RX ADMIN — ALPRAZOLAM 0.5 MG: 0.5 TABLET ORAL at 09:10

## 2021-10-12 RX ADMIN — Medication: at 12:10

## 2021-10-12 RX ADMIN — PSEUDOEPHEDRINE HCL 120 MG: 120 TABLET, FILM COATED, EXTENDED RELEASE ORAL at 09:10

## 2021-10-12 RX ADMIN — CHLORHEXIDINE GLUCONATE 10 ML: 1.2 RINSE ORAL at 08:10

## 2021-10-12 RX ADMIN — CETIRIZINE HYDROCHLORIDE 10 MG: 10 TABLET, FILM COATED ORAL at 09:10

## 2021-10-12 RX ADMIN — CHLORHEXIDINE GLUCONATE 10 ML: 1.2 RINSE ORAL at 09:10

## 2021-10-12 RX ADMIN — KETOROLAC TROMETHAMINE 30 MG: 30 INJECTION, SOLUTION INTRAMUSCULAR at 01:10

## 2021-10-12 RX ADMIN — OXYCODONE AND ACETAMINOPHEN 1 TABLET: 10; 325 TABLET ORAL at 04:10

## 2021-10-13 VITALS
OXYGEN SATURATION: 100 % | HEIGHT: 66 IN | DIASTOLIC BLOOD PRESSURE: 73 MMHG | HEART RATE: 109 BPM | RESPIRATION RATE: 20 BRPM | BODY MASS INDEX: 24.8 KG/M2 | SYSTOLIC BLOOD PRESSURE: 108 MMHG | TEMPERATURE: 98 F | WEIGHT: 154.31 LBS

## 2021-10-13 PROCEDURE — 99900035 HC TECH TIME PER 15 MIN (STAT)

## 2021-10-13 PROCEDURE — 25000003 PHARM REV CODE 250: Performed by: PHYSICIAN ASSISTANT

## 2021-10-13 PROCEDURE — 25000003 PHARM REV CODE 250: Performed by: OBSTETRICS & GYNECOLOGY

## 2021-10-13 RX ORDER — DOCUSATE SODIUM 100 MG/1
100 CAPSULE, LIQUID FILLED ORAL 2 TIMES DAILY
Qty: 30 CAPSULE | Refills: 0 | Status: SHIPPED | OUTPATIENT
Start: 2021-10-13

## 2021-10-13 RX ORDER — POLYETHYLENE GLYCOL 3350 17 G/17G
17 POWDER, FOR SOLUTION ORAL DAILY PRN
Status: DISCONTINUED | OUTPATIENT
Start: 2021-10-13 | End: 2021-10-13 | Stop reason: HOSPADM

## 2021-10-13 RX ORDER — POLYETHYLENE GLYCOL 3350 17 G/17G
17 POWDER, FOR SOLUTION ORAL DAILY PRN
Refills: 0 | COMMUNITY
Start: 2021-10-13

## 2021-10-13 RX ORDER — DOCUSATE SODIUM 100 MG/1
100 CAPSULE, LIQUID FILLED ORAL 2 TIMES DAILY
Status: DISCONTINUED | OUTPATIENT
Start: 2021-10-13 | End: 2021-10-13 | Stop reason: HOSPADM

## 2021-10-13 RX ORDER — OXYCODONE AND ACETAMINOPHEN 10; 325 MG/1; MG/1
1 TABLET ORAL EVERY 4 HOURS PRN
Qty: 20 TABLET | Refills: 0 | Status: SHIPPED | OUTPATIENT
Start: 2021-10-13 | End: 2021-10-19

## 2021-10-13 RX ORDER — IBUPROFEN 400 MG/1
800 TABLET ORAL EVERY 8 HOURS
Status: DISCONTINUED | OUTPATIENT
Start: 2021-10-13 | End: 2021-10-13 | Stop reason: HOSPADM

## 2021-10-13 RX ORDER — IBUPROFEN 800 MG/1
800 TABLET ORAL EVERY 8 HOURS
Qty: 30 TABLET | Refills: 0 | Status: SHIPPED | OUTPATIENT
Start: 2021-10-13 | End: 2023-07-11 | Stop reason: SDUPTHER

## 2021-10-13 RX ADMIN — POLYETHYLENE GLYCOL 3350 17 G: 17 POWDER, FOR SOLUTION ORAL at 11:10

## 2021-10-13 RX ADMIN — CHLORHEXIDINE GLUCONATE 10 ML: 1.2 RINSE ORAL at 09:10

## 2021-10-13 RX ADMIN — IBUPROFEN 800 MG: 400 TABLET ORAL at 03:10

## 2021-10-13 RX ADMIN — OXYCODONE AND ACETAMINOPHEN 1 TABLET: 10; 325 TABLET ORAL at 01:10

## 2021-10-13 RX ADMIN — IBUPROFEN 800 MG: 400 TABLET ORAL at 09:10

## 2021-10-13 RX ADMIN — PSEUDOEPHEDRINE HCL 120 MG: 120 TABLET, FILM COATED, EXTENDED RELEASE ORAL at 10:10

## 2021-10-13 RX ADMIN — OXYCODONE AND ACETAMINOPHEN 1 TABLET: 10; 325 TABLET ORAL at 04:10

## 2021-10-13 RX ADMIN — OXYCODONE AND ACETAMINOPHEN 1 TABLET: 10; 325 TABLET ORAL at 09:10

## 2021-10-13 RX ADMIN — MUPIROCIN: 20 OINTMENT TOPICAL at 09:10

## 2021-10-13 RX ADMIN — DOCUSATE SODIUM 100 MG: 100 CAPSULE, LIQUID FILLED ORAL at 11:10

## 2021-10-14 ENCOUNTER — PATIENT MESSAGE (OUTPATIENT)
Dept: OBSTETRICS AND GYNECOLOGY | Facility: CLINIC | Age: 41
End: 2021-10-14
Payer: COMMERCIAL

## 2021-10-16 ENCOUNTER — PATIENT MESSAGE (OUTPATIENT)
Dept: OBSTETRICS AND GYNECOLOGY | Facility: CLINIC | Age: 41
End: 2021-10-16

## 2021-10-17 ENCOUNTER — PATIENT MESSAGE (OUTPATIENT)
Dept: OBSTETRICS AND GYNECOLOGY | Facility: CLINIC | Age: 41
End: 2021-10-17
Payer: COMMERCIAL

## 2021-10-19 ENCOUNTER — OFFICE VISIT (OUTPATIENT)
Dept: OBSTETRICS AND GYNECOLOGY | Facility: CLINIC | Age: 41
End: 2021-10-19
Payer: COMMERCIAL

## 2021-10-19 VITALS
BODY MASS INDEX: 24.06 KG/M2 | HEIGHT: 66 IN | WEIGHT: 149.69 LBS | DIASTOLIC BLOOD PRESSURE: 82 MMHG | SYSTOLIC BLOOD PRESSURE: 111 MMHG

## 2021-10-19 DIAGNOSIS — Z98.890 S/P MYOMECTOMY: Primary | ICD-10-CM

## 2021-10-19 PROCEDURE — 99999 PR PBB SHADOW E&M-EST. PATIENT-LVL III: CPT | Mod: PBBFAC,,,

## 2021-10-19 PROCEDURE — 99024 PR POST-OP FOLLOW-UP VISIT: ICD-10-PCS | Mod: S$GLB,,, | Performed by: OBSTETRICS & GYNECOLOGY

## 2021-10-19 PROCEDURE — 99999 PR PBB SHADOW E&M-EST. PATIENT-LVL III: ICD-10-PCS | Mod: PBBFAC,,,

## 2021-10-19 PROCEDURE — 99024 POSTOP FOLLOW-UP VISIT: CPT | Mod: S$GLB,,, | Performed by: OBSTETRICS & GYNECOLOGY

## 2021-10-19 RX ORDER — OXYCODONE AND ACETAMINOPHEN 5; 325 MG/1; MG/1
1 TABLET ORAL EVERY 4 HOURS PRN
Qty: 10 TABLET | Refills: 0 | Status: SHIPPED | OUTPATIENT
Start: 2021-10-19 | End: 2021-10-25 | Stop reason: SDUPTHER

## 2021-10-20 LAB
FINAL PATHOLOGIC DIAGNOSIS: NORMAL
GROSS: NORMAL
Lab: NORMAL

## 2021-10-21 ENCOUNTER — PATIENT MESSAGE (OUTPATIENT)
Dept: OBSTETRICS AND GYNECOLOGY | Facility: CLINIC | Age: 41
End: 2021-10-21
Payer: COMMERCIAL

## 2021-10-24 ENCOUNTER — PATIENT MESSAGE (OUTPATIENT)
Dept: FAMILY MEDICINE | Facility: CLINIC | Age: 41
End: 2021-10-24
Payer: COMMERCIAL

## 2021-10-25 ENCOUNTER — PATIENT MESSAGE (OUTPATIENT)
Dept: OBSTETRICS AND GYNECOLOGY | Facility: HOSPITAL | Age: 41
End: 2021-10-25
Payer: COMMERCIAL

## 2021-10-25 ENCOUNTER — TELEPHONE (OUTPATIENT)
Dept: OBSTETRICS AND GYNECOLOGY | Facility: CLINIC | Age: 41
End: 2021-10-25
Payer: COMMERCIAL

## 2021-10-25 ENCOUNTER — PATIENT MESSAGE (OUTPATIENT)
Dept: OBSTETRICS AND GYNECOLOGY | Facility: CLINIC | Age: 41
End: 2021-10-25
Payer: COMMERCIAL

## 2021-10-25 DIAGNOSIS — Z98.890 S/P MYOMECTOMY: ICD-10-CM

## 2021-10-25 RX ORDER — OXYCODONE AND ACETAMINOPHEN 5; 325 MG/1; MG/1
1 TABLET ORAL EVERY 4 HOURS PRN
Qty: 10 TABLET | Refills: 0 | Status: SHIPPED | OUTPATIENT
Start: 2021-10-25 | End: 2022-03-01

## 2021-10-26 RX ORDER — CYCLOBENZAPRINE HCL 10 MG
10 TABLET ORAL 3 TIMES DAILY PRN
Qty: 30 TABLET | Refills: 0 | Status: SHIPPED | OUTPATIENT
Start: 2021-10-26 | End: 2021-11-05

## 2021-10-29 ENCOUNTER — PATIENT MESSAGE (OUTPATIENT)
Dept: OBSTETRICS AND GYNECOLOGY | Facility: HOSPITAL | Age: 41
End: 2021-10-29
Payer: COMMERCIAL

## 2021-11-05 ENCOUNTER — TELEPHONE (OUTPATIENT)
Dept: OBSTETRICS AND GYNECOLOGY | Facility: CLINIC | Age: 41
End: 2021-11-05
Payer: COMMERCIAL

## 2021-11-08 ENCOUNTER — PATIENT MESSAGE (OUTPATIENT)
Dept: OBSTETRICS AND GYNECOLOGY | Facility: CLINIC | Age: 41
End: 2021-11-08
Payer: COMMERCIAL

## 2021-11-08 ENCOUNTER — TELEPHONE (OUTPATIENT)
Dept: OBSTETRICS AND GYNECOLOGY | Facility: CLINIC | Age: 41
End: 2021-11-08
Payer: COMMERCIAL

## 2021-11-15 ENCOUNTER — OFFICE VISIT (OUTPATIENT)
Dept: OBSTETRICS AND GYNECOLOGY | Facility: CLINIC | Age: 41
End: 2021-11-15
Payer: COMMERCIAL

## 2021-11-15 VITALS
BODY MASS INDEX: 24.48 KG/M2 | HEIGHT: 66 IN | WEIGHT: 152.31 LBS | SYSTOLIC BLOOD PRESSURE: 110 MMHG | DIASTOLIC BLOOD PRESSURE: 70 MMHG

## 2021-11-15 DIAGNOSIS — Z98.890 S/P MYOMECTOMY: Primary | ICD-10-CM

## 2021-11-15 PROCEDURE — 99999 PR PBB SHADOW E&M-EST. PATIENT-LVL III: CPT | Mod: PBBFAC,,, | Performed by: OBSTETRICS & GYNECOLOGY

## 2021-11-15 PROCEDURE — 99024 PR POST-OP FOLLOW-UP VISIT: ICD-10-PCS | Mod: S$GLB,,, | Performed by: OBSTETRICS & GYNECOLOGY

## 2021-11-15 PROCEDURE — 99024 POSTOP FOLLOW-UP VISIT: CPT | Mod: S$GLB,,, | Performed by: OBSTETRICS & GYNECOLOGY

## 2021-11-15 PROCEDURE — 99999 PR PBB SHADOW E&M-EST. PATIENT-LVL III: ICD-10-PCS | Mod: PBBFAC,,, | Performed by: OBSTETRICS & GYNECOLOGY

## 2021-11-17 ENCOUNTER — PATIENT MESSAGE (OUTPATIENT)
Dept: PULMONOLOGY | Facility: CLINIC | Age: 41
End: 2021-11-17
Payer: COMMERCIAL

## 2021-11-28 ENCOUNTER — PATIENT MESSAGE (OUTPATIENT)
Dept: PULMONOLOGY | Facility: CLINIC | Age: 41
End: 2021-11-28
Payer: COMMERCIAL

## 2021-12-15 ENCOUNTER — PATIENT MESSAGE (OUTPATIENT)
Dept: OBSTETRICS AND GYNECOLOGY | Facility: CLINIC | Age: 41
End: 2021-12-15
Payer: COMMERCIAL

## 2021-12-27 ENCOUNTER — PATIENT MESSAGE (OUTPATIENT)
Dept: FAMILY MEDICINE | Facility: CLINIC | Age: 41
End: 2021-12-27
Payer: COMMERCIAL

## 2021-12-27 ENCOUNTER — PATIENT MESSAGE (OUTPATIENT)
Dept: PULMONOLOGY | Facility: CLINIC | Age: 41
End: 2021-12-27
Payer: COMMERCIAL

## 2021-12-27 RX ORDER — PSEUDOEPHEDRINE HCL 120 MG/1
TABLET, FILM COATED, EXTENDED RELEASE ORAL
Qty: 60 TABLET | Refills: 9 | Status: SHIPPED | OUTPATIENT
Start: 2021-12-27 | End: 2022-08-05 | Stop reason: SDUPTHER

## 2022-01-10 ENCOUNTER — PATIENT MESSAGE (OUTPATIENT)
Dept: PULMONOLOGY | Facility: CLINIC | Age: 42
End: 2022-01-10
Payer: COMMERCIAL

## 2022-02-15 RX ORDER — ZOLPIDEM TARTRATE 5 MG/1
5 TABLET ORAL NIGHTLY PRN
Qty: 30 TABLET | Refills: 0 | Status: SHIPPED | OUTPATIENT
Start: 2022-02-15 | End: 2022-03-22 | Stop reason: SDUPTHER

## 2022-02-15 NOTE — TELEPHONE ENCOUNTER
No new care gaps identified.  Powered by [employer]. Reference number: [id number].   2/14/2022 8:27:06 PM CST

## 2022-02-22 ENCOUNTER — PATIENT OUTREACH (OUTPATIENT)
Dept: ADMINISTRATIVE | Facility: OTHER | Age: 42
End: 2022-02-22
Payer: COMMERCIAL

## 2022-02-22 DIAGNOSIS — Z12.31 ENCOUNTER FOR SCREENING MAMMOGRAM FOR BREAST CANCER: Primary | ICD-10-CM

## 2022-02-23 ENCOUNTER — OFFICE VISIT (OUTPATIENT)
Dept: SLEEP MEDICINE | Facility: CLINIC | Age: 42
End: 2022-02-23
Payer: COMMERCIAL

## 2022-02-23 VITALS — BODY MASS INDEX: 24.43 KG/M2 | WEIGHT: 152 LBS | HEIGHT: 66 IN

## 2022-02-23 DIAGNOSIS — G47.33 OSA (OBSTRUCTIVE SLEEP APNEA): Primary | ICD-10-CM

## 2022-02-23 DIAGNOSIS — F51.01 PRIMARY INSOMNIA: ICD-10-CM

## 2022-02-23 PROCEDURE — 99213 PR OFFICE/OUTPT VISIT, EST, LEVL III, 20-29 MIN: ICD-10-PCS | Mod: 95,,, | Performed by: NURSE PRACTITIONER

## 2022-02-23 PROCEDURE — 99213 OFFICE O/P EST LOW 20 MIN: CPT | Mod: 95,,, | Performed by: NURSE PRACTITIONER

## 2022-02-23 PROCEDURE — 1160F RVW MEDS BY RX/DR IN RCRD: CPT | Mod: CPTII,95,, | Performed by: NURSE PRACTITIONER

## 2022-02-23 PROCEDURE — 1160F PR REVIEW ALL MEDS BY PRESCRIBER/CLIN PHARMACIST DOCUMENTED: ICD-10-PCS | Mod: CPTII,95,, | Performed by: NURSE PRACTITIONER

## 2022-02-23 PROCEDURE — 3008F BODY MASS INDEX DOCD: CPT | Mod: CPTII,95,, | Performed by: NURSE PRACTITIONER

## 2022-02-23 PROCEDURE — 3008F PR BODY MASS INDEX (BMI) DOCUMENTED: ICD-10-PCS | Mod: CPTII,95,, | Performed by: NURSE PRACTITIONER

## 2022-02-23 PROCEDURE — 1159F MED LIST DOCD IN RCRD: CPT | Mod: CPTII,95,, | Performed by: NURSE PRACTITIONER

## 2022-02-23 PROCEDURE — 1159F PR MEDICATION LIST DOCUMENTED IN MEDICAL RECORD: ICD-10-PCS | Mod: CPTII,95,, | Performed by: NURSE PRACTITIONER

## 2022-02-23 NOTE — PROGRESS NOTES
"Subjective:      Patient ID: Shasta Rivera is a 41 y.o. female.    Chief Complaint: Sleep Apnea  The patient location is: Home  The chief complaint leading to consultation is: sleep apnea  Visit type: Virtual visit with synchronous audio and video  Total time spent with patient: 16 min   Each patient to whom he or she provides medical services by telemedicine is:  (1) informed of the relationship between the physician and patient and the respective role of any other health care provider with respect to management of the patient; and (2) notified that he or she may decline to receive medical services by telemedicine and may withdraw from such care at any time.    HPI  Presents to office for review of AutoPAP therapy. Patient states improved symptoms with use of AutoPAP. Sleeping more soundly. Waking up feeling more refreshed. Improved daytime sleepiness. Patient states she is benefiting from use of the AutoPAP.   She would like to try FFM - has dry mouth.    Patient Active Problem List   Diagnosis    Allergic rhinitis    Uterine fibroid    Primary insomnia    SAMIRA (obstructive sleep apnea)    MAYE (generalized anxiety disorder)    Menorrhagia with regular cycle    S/P myomectomy         Ht 5' 6" (1.676 m)   Wt 68.9 kg (152 lb)   BMI 24.53 kg/m²   Body mass index is 24.53 kg/m².    Review of Systems   Constitutional: Negative.    HENT: Negative.    Respiratory: Negative.    Cardiovascular: Negative.    Musculoskeletal: Negative.    Gastrointestinal: Negative.    Neurological: Negative.    Psychiatric/Behavioral: Negative.      Objective:      Physical Exam  Constitutional:       Appearance: Normal appearance. She is well-developed.   HENT:      Head: Normocephalic and atraumatic.   Pulmonary:      Effort: Pulmonary effort is normal. No tachypnea, bradypnea, accessory muscle usage or respiratory distress.   Musculoskeletal:      Cervical back: Normal range of motion.   Skin:     Findings: No rash. "   Neurological:      Mental Status: She is alert and oriented to person, place, and time.   Psychiatric:         Behavior: Behavior normal.         Thought Content: Thought content normal.         Judgment: Judgment normal.       Personal Diagnostic Review  Review of download. Compliant and AHI 2.8. manual view over the virtual visit.    Assessment:       1. SAMIRA (obstructive sleep apnea)    2. Primary insomnia        Outpatient Encounter Medications as of 2/23/2022   Medication Sig Dispense Refill    ALPRAZolam (XANAX) 0.25 MG tablet TAKE 1-2 TABLETS BY MOUTH 3 TIMES DAILY AS NEEDED FOR ANXIETY. 30 tablet 3    cetirizine (ZYRTEC) 10 MG tablet Take 1 tablet (10 mg total) by mouth once daily. 30 tablet 5    docusate sodium (COLACE) 100 MG capsule Take 1 capsule (100 mg total) by mouth 2 (two) times daily. 30 capsule 0    ibuprofen (ADVIL,MOTRIN) 800 MG tablet Take 1 tablet (800 mg total) by mouth every 8 (eight) hours. 30 tablet 0    multivitamin capsule Take 1 capsule by mouth once daily.      oxyCODONE-acetaminophen (PERCOCET) 5-325 mg per tablet Take 1 tablet by mouth every 4 (four) hours as needed for Pain. 10 tablet 0    polyethylene glycol (GLYCOLAX) 17 gram PwPk Take 17 g by mouth daily as needed.  0    pseudoephedrine (12 HOUR DECONGESTANT) 120 mg 12 hr tablet TAKE 1 TABLET (120 MG TOTAL) BY MOUTH 2 (TWO) TIMES DAILY. 60 tablet 9    zolpidem (AMBIEN) 5 MG Tab TAKE 1 TABLET (5 MG TOTAL) BY MOUTH NIGHTLY AS NEEDED. 30 tablet 0     No facility-administered encounter medications on file as of 2/23/2022.     Orders Placed This Encounter   Procedures    CPAP/BIPAP SUPPLIES     Order Specific Question:   Length of need (1-99 months):     Answer:   0     Order Specific Question:   Choose ONE mask type and its corresponding cushions and/or pillows:     Answer:    Full Face Mask, 1 per 90 days:  Full Face Cushion, (3 per 90 days)     Order Specific Question:   Choose EITHER Heated or Non-Heated  Tubjing     Answer:    Heated Tubing, 1 per 6 months     Order Specific Question:   All other supplies as needed as listed below:     Answer:    Disposable Filter, 6 per 90 days     Order Specific Question:   All other supplies as needed as listed below:     Answer:    Non-Disposable Filter, 1 per 180 days     Order Specific Question:   All other supplies as needed as listed below:     Answer:    Humidifier Chamber, 1 per 180 days     Order Specific Question:   All other supplies as needed as listed below:     Answer:    Chin Strap, 1 per 180 days     Order Specific Question:   All other supplies as needed as listed below:     Answer:    Headgear, 1 per 180 days     Plan:        Problem List Items Addressed This Visit        Other    Primary insomnia    Current Assessment & Plan     Ambien if needed.           SAMIRA (obstructive sleep apnea) - Primary    Overview     Autopap           Current Assessment & Plan     Compliant with PAP and benefits from use. Follow up annually in the sleep clinic.             Relevant Orders    CPAP/BIPAP SUPPLIES        I spent a total of 20 minutes on the day of the visit.  This includes face to face time and non-face to face time preparing to see the patient (eg, review of tests), obtaining and/or reviewing separately obtained history, documenting clinical information in the electronic or other health record, independently interpreting results and communicating results to the patient/family/caregiver, or care coordinator.

## 2022-02-23 NOTE — PROGRESS NOTES
Health Maintenance Due   Topic Date Due    Influenza Vaccine (1) 09/01/2021    Mammogram  03/17/2022     Updates were requested from care everywhere.  Chart was reviewed for overdue Proactive [facility] Encounters (SHENA) topics (CRS, Breast Cancer Screening, Eye exam)  Health Maintenance has been updated.  LINKS immunization registry triggered.  Immunizations were reconciled.

## 2022-03-01 ENCOUNTER — OFFICE VISIT (OUTPATIENT)
Dept: FAMILY MEDICINE | Facility: CLINIC | Age: 42
End: 2022-03-01
Payer: COMMERCIAL

## 2022-03-01 VITALS
TEMPERATURE: 98 F | WEIGHT: 156.5 LBS | HEART RATE: 120 BPM | RESPIRATION RATE: 18 BRPM | DIASTOLIC BLOOD PRESSURE: 80 MMHG | OXYGEN SATURATION: 100 % | SYSTOLIC BLOOD PRESSURE: 116 MMHG | HEIGHT: 66 IN | BODY MASS INDEX: 25.15 KG/M2

## 2022-03-01 DIAGNOSIS — Z23 NEED FOR VACCINATION: ICD-10-CM

## 2022-03-01 DIAGNOSIS — Z12.31 ENCOUNTER FOR SCREENING MAMMOGRAM FOR MALIGNANT NEOPLASM OF BREAST: ICD-10-CM

## 2022-03-01 DIAGNOSIS — G47.33 OSA (OBSTRUCTIVE SLEEP APNEA): ICD-10-CM

## 2022-03-01 DIAGNOSIS — Z00.00 PREVENTATIVE HEALTH CARE: Primary | ICD-10-CM

## 2022-03-01 DIAGNOSIS — F41.1 GAD (GENERALIZED ANXIETY DISORDER): ICD-10-CM

## 2022-03-01 PROCEDURE — 1159F MED LIST DOCD IN RCRD: CPT | Mod: CPTII,S$GLB,, | Performed by: FAMILY MEDICINE

## 2022-03-01 PROCEDURE — 3008F PR BODY MASS INDEX (BMI) DOCUMENTED: ICD-10-PCS | Mod: CPTII,S$GLB,, | Performed by: FAMILY MEDICINE

## 2022-03-01 PROCEDURE — 90686 IIV4 VACC NO PRSV 0.5 ML IM: CPT | Mod: S$GLB,,, | Performed by: FAMILY MEDICINE

## 2022-03-01 PROCEDURE — 90686 FLU VACCINE (QUAD) GREATER THAN OR EQUAL TO 3YO PRESERVATIVE FREE IM: ICD-10-PCS | Mod: S$GLB,,, | Performed by: FAMILY MEDICINE

## 2022-03-01 PROCEDURE — 99396 PR PREVENTIVE VISIT,EST,40-64: ICD-10-PCS | Mod: 25,S$GLB,, | Performed by: FAMILY MEDICINE

## 2022-03-01 PROCEDURE — 1160F RVW MEDS BY RX/DR IN RCRD: CPT | Mod: CPTII,S$GLB,, | Performed by: FAMILY MEDICINE

## 2022-03-01 PROCEDURE — 3079F DIAST BP 80-89 MM HG: CPT | Mod: CPTII,S$GLB,, | Performed by: FAMILY MEDICINE

## 2022-03-01 PROCEDURE — 99396 PREV VISIT EST AGE 40-64: CPT | Mod: 25,S$GLB,, | Performed by: FAMILY MEDICINE

## 2022-03-01 PROCEDURE — 90471 FLU VACCINE (QUAD) GREATER THAN OR EQUAL TO 3YO PRESERVATIVE FREE IM: ICD-10-PCS | Mod: S$GLB,,, | Performed by: FAMILY MEDICINE

## 2022-03-01 PROCEDURE — 99999 PR PBB SHADOW E&M-EST. PATIENT-LVL IV: ICD-10-PCS | Mod: PBBFAC,,, | Performed by: FAMILY MEDICINE

## 2022-03-01 PROCEDURE — 99999 PR PBB SHADOW E&M-EST. PATIENT-LVL IV: CPT | Mod: PBBFAC,,, | Performed by: FAMILY MEDICINE

## 2022-03-01 PROCEDURE — 3074F SYST BP LT 130 MM HG: CPT | Mod: CPTII,S$GLB,, | Performed by: FAMILY MEDICINE

## 2022-03-01 PROCEDURE — 90471 IMMUNIZATION ADMIN: CPT | Mod: S$GLB,,, | Performed by: FAMILY MEDICINE

## 2022-03-01 PROCEDURE — 3079F PR MOST RECENT DIASTOLIC BLOOD PRESSURE 80-89 MM HG: ICD-10-PCS | Mod: CPTII,S$GLB,, | Performed by: FAMILY MEDICINE

## 2022-03-01 PROCEDURE — 1159F PR MEDICATION LIST DOCUMENTED IN MEDICAL RECORD: ICD-10-PCS | Mod: CPTII,S$GLB,, | Performed by: FAMILY MEDICINE

## 2022-03-01 PROCEDURE — 3008F BODY MASS INDEX DOCD: CPT | Mod: CPTII,S$GLB,, | Performed by: FAMILY MEDICINE

## 2022-03-01 PROCEDURE — 3074F PR MOST RECENT SYSTOLIC BLOOD PRESSURE < 130 MM HG: ICD-10-PCS | Mod: CPTII,S$GLB,, | Performed by: FAMILY MEDICINE

## 2022-03-01 PROCEDURE — 1160F PR REVIEW ALL MEDS BY PRESCRIBER/CLIN PHARMACIST DOCUMENTED: ICD-10-PCS | Mod: CPTII,S$GLB,, | Performed by: FAMILY MEDICINE

## 2022-03-01 RX ORDER — HYDROCODONE BITARTRATE AND ACETAMINOPHEN 10; 325 MG/1; MG/1
1 TABLET ORAL 4 TIMES DAILY PRN
COMMUNITY
Start: 2022-02-23

## 2022-03-01 NOTE — PROGRESS NOTES
CHIEF COMPLAINT:  This is a 41-year-old female here for preventive health exam.     SUBJECTIVE:  Patient is doing well without complaints. She had myomectomy in October 2021 and has regular menses which are not as heavy and painful as previous. The patient has had procedure to retrieve her eggs for future pregnancy. She has a history of anemia. She occasionally uses alprazolam for anxiety.  She takes zolpidem 5 mg for sleeplessness.  She takes pseudoephedrine and Zyrtec for allergic rhinitis.  The patient often has numbness and discoloration of finger tips especially when exposed to cold. Patient has positive serology for herpes simplex 2.  She  reports no past history of herpetic outbreaks.  Patient sees pain management for bulging discs and chronic back pain for which she takes opioids as needed.     Eye exam 2020. Pap smear March 2021. Mammogram March 2021. Tdap September 2012. Flu vaccine October 2020. COVID 19 vaccine March, April, October 2021.     ROS:  GENERAL: Patient denies fever, chills, night sweats. Patient denies weight gain or loss. Patient denies anorexia, fatigue, weakness or swollen glands.  SKIN: Patient denies rash or hair loss.  HEENT: Patient denies sore throat, ear pain, hearing loss, nasal congestion, or runny nose. Patient denies visual disturbance, eye irritation or discharge.  LUNGS: Patient denies cough, wheeze or hemoptysis.  CARDIOVASCULAR: Patient denies chest pain, shortness of breath, palpitations, syncope or lower extremity edema.  GI: Patient denies abdominal pain, nausea, vomiting, diarrhea, constipation, blood in stool or melena.  GENITOURINARY: Patient denies pelvic pain, vaginal discharge, itch or odor. Patient denies irregular vaginal bleeding. Patient denies dysuria, frequency, hematuria, nocturia, urgency or incontinence.  BREASTS: Patient denies breast pain, mass or nipple discharge.  MUSCULOSKELETAL: Patient denies joint pain, swelling, redness or warmth.  NEUROLOGIC:  Patient denies headache, vertigo, paresthesias, weakness in limb, dysarthria, dysphagia or abnormality of gait.  PSYCHIATRIC: Patient denies anxiety, depression, or memory loss. Positive for insomnia.     OBJECTIVE:   GENERAL: Well-developed well-nourished black female alert and oriented x3, in no acute distress. Memory, judgment and cognition without deficit.   SKIN: Clear without rash. Normal color and tone.  HEENT: Eyes: Clear conjunctivae. Pupils equal reactive to light and accommodation. Ears: Clear TMs. Clear canals. Nose: Without congestion. Pharynx: Without injection or exudates.  NECK: Supple, normal range of motion. No masses, lymphadenopathy or enlarged thyroid. No JVD. Carotids 2+ and equal. No bruits.  LUNGS: Clear to auscultation. Normal respiratory effort.  CARDIOVASCULAR: Regular rhythm, normal S1, S2 without murmur, gallop or rub.  BACK: No CVA or spinal tenderness.  BREASTS: No masses, tenderness or nipple discharge.  ABDOMEN: Normal appearance. Active bowel sounds. Soft, nontender without mass or organomegaly. No rebound or guarding.  EXTREMITIES: Without cyanosis, clubbing or edema. Distal pulses 2+ and equal. Normal range of motion in all extremities. No joint effusion, erythema or warmth.  NEUROLOGIC: Cranial nerves II through XII without deficit. Motor strength equal bilaterally. Sensation normal to touch. Deep tendon reflexes 2+ and equal. Gait without abnormality. No tremor. Negative cerebellar signs.   PELVIC: Deferred due to recent myomectomy.     ASSESSMENT:  1. Preventative health care    2. MAYE (generalized anxiety disorder)    3. SAMIRA (obstructive sleep apnea)    4. Encounter for screening mammogram for malignant neoplasm of breast    5. Need for vaccination      PLAN:  1. Exercise regularly.  2. Age-appropriate counseling.  3. Fasting lab.  4. Mammogram.  5. Refill medications as needed.  6. Influenza vaccine.  7. Follow-up annually.      This note is generated with speech recognition  software and is subject to transcription error and sound alike phrases that may be missed by proofreading.

## 2022-03-09 ENCOUNTER — PATIENT MESSAGE (OUTPATIENT)
Dept: FAMILY MEDICINE | Facility: CLINIC | Age: 42
End: 2022-03-09
Payer: COMMERCIAL

## 2022-03-22 RX ORDER — ZOLPIDEM TARTRATE 5 MG/1
5 TABLET ORAL NIGHTLY PRN
Qty: 30 TABLET | Refills: 5 | Status: SHIPPED | OUTPATIENT
Start: 2022-03-22 | End: 2022-09-23

## 2022-03-22 NOTE — TELEPHONE ENCOUNTER
No new care gaps identified.  Powered by [employer]. Reference number: [id number].   3/22/2022 10:28:33 AM CDT

## 2022-04-07 ENCOUNTER — PATIENT MESSAGE (OUTPATIENT)
Dept: FAMILY MEDICINE | Facility: CLINIC | Age: 42
End: 2022-04-07
Payer: COMMERCIAL

## 2022-05-18 ENCOUNTER — PATIENT MESSAGE (OUTPATIENT)
Dept: FAMILY MEDICINE | Facility: CLINIC | Age: 42
End: 2022-05-18
Payer: COMMERCIAL

## 2022-05-18 RX ORDER — FLUTICASONE PROPIONATE 50 MCG
2 SPRAY, SUSPENSION (ML) NASAL DAILY
Qty: 16 G | Refills: 9 | Status: SHIPPED | OUTPATIENT
Start: 2022-05-18 | End: 2023-08-09

## 2022-05-18 RX ORDER — FLUTICASONE PROPIONATE 50 MCG
SPRAY, SUSPENSION (ML) NASAL
COMMUNITY
Start: 2022-03-09 | End: 2022-05-18 | Stop reason: SDUPTHER

## 2022-05-18 NOTE — TELEPHONE ENCOUNTER
Please see patient's mychart message and advise.  Pt requesting medication rx for flonase.     LV 03/01/2022  LF 03/09/2022  NV none

## 2022-05-18 NOTE — TELEPHONE ENCOUNTER
No new care gaps identified.  [facility] Embedded Care Gaps. Reference number: [id number]. 5/18/2022   1:56:29 PM CDT

## 2022-06-27 ENCOUNTER — PATIENT MESSAGE (OUTPATIENT)
Dept: FAMILY MEDICINE | Facility: CLINIC | Age: 42
End: 2022-06-27
Payer: COMMERCIAL

## 2022-06-27 RX ORDER — VALACYCLOVIR HYDROCHLORIDE 500 MG/1
500 TABLET, FILM COATED ORAL DAILY
Qty: 30 TABLET | Refills: 9 | Status: SHIPPED | OUTPATIENT
Start: 2022-06-27 | End: 2023-05-21

## 2022-08-05 ENCOUNTER — PATIENT MESSAGE (OUTPATIENT)
Dept: FAMILY MEDICINE | Facility: CLINIC | Age: 42
End: 2022-08-05
Payer: COMMERCIAL

## 2022-08-05 RX ORDER — PSEUDOEPHEDRINE HCL 120 MG/1
TABLET, FILM COATED, EXTENDED RELEASE ORAL
Qty: 60 TABLET | Refills: 0 | Status: SHIPPED | OUTPATIENT
Start: 2022-08-05 | End: 2022-08-26

## 2022-08-05 NOTE — TELEPHONE ENCOUNTER
No new care gaps identified.  [facility] Embedded Care Gaps. Reference number: [id number]. 8/05/2022   10:40:26 AM LUCIANOT

## 2022-08-12 RX ORDER — ALPRAZOLAM 0.25 MG/1
TABLET ORAL
Qty: 30 TABLET | Refills: 3 | Status: SHIPPED | OUTPATIENT
Start: 2022-08-12 | End: 2023-08-10

## 2022-08-12 NOTE — TELEPHONE ENCOUNTER
No new care gaps identified.  [facility] Embedded Care Gaps. Reference number: [id number]. 8/12/2022   11:50:16 AM CDT

## 2022-08-17 ENCOUNTER — OFFICE VISIT (OUTPATIENT)
Dept: OBSTETRICS AND GYNECOLOGY | Facility: CLINIC | Age: 42
End: 2022-08-17
Payer: COMMERCIAL

## 2022-08-17 VITALS
WEIGHT: 155.88 LBS | SYSTOLIC BLOOD PRESSURE: 118 MMHG | BODY MASS INDEX: 25.16 KG/M2 | DIASTOLIC BLOOD PRESSURE: 74 MMHG

## 2022-08-17 DIAGNOSIS — N89.8 VAGINAL ITCHING: Primary | ICD-10-CM

## 2022-08-17 DIAGNOSIS — B37.31 VULVOVAGINAL CANDIDIASIS: ICD-10-CM

## 2022-08-17 PROCEDURE — 87210 PR  SMEAR,STAIN,WET MNT,INTERP: ICD-10-PCS | Mod: QW,S$GLB,, | Performed by: NURSE PRACTITIONER

## 2022-08-17 PROCEDURE — 99213 PR OFFICE/OUTPT VISIT, EST, LEVL III, 20-29 MIN: ICD-10-PCS | Mod: S$GLB,,, | Performed by: NURSE PRACTITIONER

## 2022-08-17 PROCEDURE — 3008F BODY MASS INDEX DOCD: CPT | Mod: CPTII,S$GLB,, | Performed by: NURSE PRACTITIONER

## 2022-08-17 PROCEDURE — 87210 SMEAR WET MOUNT SALINE/INK: CPT | Mod: QW,S$GLB,, | Performed by: NURSE PRACTITIONER

## 2022-08-17 PROCEDURE — 3074F PR MOST RECENT SYSTOLIC BLOOD PRESSURE < 130 MM HG: ICD-10-PCS | Mod: CPTII,S$GLB,, | Performed by: NURSE PRACTITIONER

## 2022-08-17 PROCEDURE — 3078F PR MOST RECENT DIASTOLIC BLOOD PRESSURE < 80 MM HG: ICD-10-PCS | Mod: CPTII,S$GLB,, | Performed by: NURSE PRACTITIONER

## 2022-08-17 PROCEDURE — 99213 OFFICE O/P EST LOW 20 MIN: CPT | Mod: S$GLB,,, | Performed by: NURSE PRACTITIONER

## 2022-08-17 PROCEDURE — 1159F PR MEDICATION LIST DOCUMENTED IN MEDICAL RECORD: ICD-10-PCS | Mod: CPTII,S$GLB,, | Performed by: NURSE PRACTITIONER

## 2022-08-17 PROCEDURE — 1160F RVW MEDS BY RX/DR IN RCRD: CPT | Mod: CPTII,S$GLB,, | Performed by: NURSE PRACTITIONER

## 2022-08-17 PROCEDURE — 3008F PR BODY MASS INDEX (BMI) DOCUMENTED: ICD-10-PCS | Mod: CPTII,S$GLB,, | Performed by: NURSE PRACTITIONER

## 2022-08-17 PROCEDURE — 3074F SYST BP LT 130 MM HG: CPT | Mod: CPTII,S$GLB,, | Performed by: NURSE PRACTITIONER

## 2022-08-17 PROCEDURE — 1160F PR REVIEW ALL MEDS BY PRESCRIBER/CLIN PHARMACIST DOCUMENTED: ICD-10-PCS | Mod: CPTII,S$GLB,, | Performed by: NURSE PRACTITIONER

## 2022-08-17 PROCEDURE — 3078F DIAST BP <80 MM HG: CPT | Mod: CPTII,S$GLB,, | Performed by: NURSE PRACTITIONER

## 2022-08-17 PROCEDURE — 1159F MED LIST DOCD IN RCRD: CPT | Mod: CPTII,S$GLB,, | Performed by: NURSE PRACTITIONER

## 2022-08-17 PROCEDURE — 99999 PR PBB SHADOW E&M-EST. PATIENT-LVL III: ICD-10-PCS | Mod: PBBFAC,,, | Performed by: NURSE PRACTITIONER

## 2022-08-17 PROCEDURE — 99999 PR PBB SHADOW E&M-EST. PATIENT-LVL III: CPT | Mod: PBBFAC,,, | Performed by: NURSE PRACTITIONER

## 2022-08-17 RX ORDER — FLUCONAZOLE 150 MG/1
TABLET ORAL
Qty: 2 TABLET | Refills: 0 | Status: SHIPPED | OUTPATIENT
Start: 2022-08-17 | End: 2023-05-23

## 2022-08-17 RX ORDER — DEXBROMPHENIRAMINE MALEATE, PHENYLEPHRINE HYDROCHLORIDE 2; 7.5 MG/1; MG/1
TABLET ORAL
COMMUNITY
Start: 2022-03-09 | End: 2022-08-26

## 2022-08-17 RX ORDER — CLOBETASOL PROPIONATE 0.05 G/100ML
SHAMPOO TOPICAL
COMMUNITY
Start: 2022-08-01

## 2022-08-17 RX ORDER — TRETINOIN 0.6 MG/G
1 GEL TOPICAL NIGHTLY
COMMUNITY
Start: 2022-06-21

## 2022-08-17 NOTE — PROGRESS NOTES
Subjective:       Patient ID: Shasta Rivera is a 42 y.o. female.    Chief Complaint:  Vaginal Itching    Patient's last menstrual period was 2022 (approximate).  History of Present Illness  Patient presents to clinic with complaints of increased vaginal discharge with associated vaginal itching for 1 week.  Reports itching occurs intermittently although has persisted for 1 week.  Denies vaginal odor.    OB History    Para Term  AB Living   1 0 0 0 1 0   SAB IAB Ectopic Multiple Live Births   0 1 0 0 0      # Outcome Date GA Lbr Shantanu/2nd Weight Sex Delivery Anes PTL Lv   1 IAB                Review of Systems  Review of Systems   Constitutional: Negative for appetite change, fatigue and fever.   Gastrointestinal: Negative for abdominal pain, bloating, constipation, diarrhea, nausea and vomiting.   Genitourinary: Positive for vaginal discharge. Negative for bladder incontinence, dysmenorrhea, dyspareunia, dysuria, flank pain, frequency, genital sores, menorrhagia, menstrual problem, pelvic pain, urgency, vaginal bleeding, vaginal pain, postcoital bleeding, vaginal dryness and vaginal odor.        Vaginal itching   All other systems reviewed and are negative.           Objective:      Physical Exam:   Constitutional: She is oriented to person, place, and time. She appears well-developed and well-nourished.    HENT:   Head: Normocephalic and atraumatic.   Nose: Nose normal.    Eyes: Pupils are equal, round, and reactive to light. Conjunctivae and EOM are normal.     Cardiovascular: Normal rate and regular rhythm.     Pulmonary/Chest: Effort normal.        Abdominal: Soft. She exhibits no distension. There is no abdominal tenderness. Hernia confirmed negative in the right inguinal area and confirmed negative in the left inguinal area.     Genitourinary:    Inguinal canal, uterus, right adnexa, left adnexa and rectum normal.      Pelvic exam was performed with patient supine.   The external female  genitalia was normal.   Genitalia hair distrobution normal .   Labial bartholins normal.There is no rash, tenderness, lesion or injury on the right labia. There is no rash, tenderness, lesion or injury on the left labia. Cervix is normal. Right adnexum displays no mass, no tenderness and no fullness. Left adnexum displays no mass, no tenderness and no fullness. There is erythema (Mild) and vaginal discharge (White, thick) in the vagina. No rectocele or cystocele in the vagina.           Musculoskeletal: Normal range of motion and moves all extremeties.      Lymphadenopathy: No inguinal adenopathy noted on the right or left side.    Neurological: She is alert and oriented to person, place, and time.    Skin: Skin is warm and dry. She is not diaphoretic.    Psychiatric: She has a normal mood and affect. Her behavior is normal. Judgment and thought content normal.        Microscopic wet-mount exam shows hyphae, spores.       Assessment:     1. Vaginal itching    2. Vulvovaginal candidiasis          Plan:   Shasta was seen today for vaginal itching.    Diagnoses and all orders for this visit:    Vaginal itching  -     POCT WET PREP    Vulvovaginal candidiasis  -     fluconazole (DIFLUCAN) 150 MG Tab; Take 1 tablet today and then take 1 tablet in 72 hours.      Patient was counseled today on vaginitis prevention including :   a. avoiding feminine products such as deoderant soaps, body wash, bubble bath, douches, scented toilet paper, deoderant tampons or pads, feminine wipes, chronic pad use, etc.   b. avoiding other vulvovaginal irritants such as long hot baths, humidity, tight, synthetic clothing, chlorine and sitting around in wet bathing suits   c. wearing cotton underwear, avoiding thong underwear and no underwear to bed   d. taking showers instead of baths and use a hair dryer on cool setting afterwards to dry   e. wearing cotton to exercise and shower immediately after exercise and change clothes   f. using  polyurethane condoms without spermicide if sexually active and symptoms are triggered by intercourse

## 2022-08-25 ENCOUNTER — PATIENT MESSAGE (OUTPATIENT)
Dept: FAMILY MEDICINE | Facility: CLINIC | Age: 42
End: 2022-08-25
Payer: COMMERCIAL

## 2022-08-26 ENCOUNTER — PATIENT MESSAGE (OUTPATIENT)
Dept: FAMILY MEDICINE | Facility: CLINIC | Age: 42
End: 2022-08-26
Payer: COMMERCIAL

## 2022-08-26 RX ORDER — PSEUDOEPHEDRINE HCL 120 MG/1
120 TABLET, FILM COATED, EXTENDED RELEASE ORAL 2 TIMES DAILY
Qty: 60 TABLET | Refills: 5 | Status: SHIPPED | OUTPATIENT
Start: 2022-08-26 | End: 2023-02-19

## 2022-09-15 ENCOUNTER — PATIENT MESSAGE (OUTPATIENT)
Dept: FAMILY MEDICINE | Facility: CLINIC | Age: 42
End: 2022-09-15
Payer: COMMERCIAL

## 2022-09-20 ENCOUNTER — OFFICE VISIT (OUTPATIENT)
Dept: INTERNAL MEDICINE | Facility: CLINIC | Age: 42
End: 2022-09-20
Payer: COMMERCIAL

## 2022-09-20 VITALS
HEIGHT: 66 IN | DIASTOLIC BLOOD PRESSURE: 74 MMHG | BODY MASS INDEX: 24.78 KG/M2 | OXYGEN SATURATION: 98 % | WEIGHT: 154.19 LBS | SYSTOLIC BLOOD PRESSURE: 118 MMHG | HEART RATE: 97 BPM | TEMPERATURE: 98 F

## 2022-09-20 DIAGNOSIS — R09.82 POSTNASAL DRIP: ICD-10-CM

## 2022-09-20 DIAGNOSIS — J06.9 URI WITH COUGH AND CONGESTION: Primary | ICD-10-CM

## 2022-09-20 PROCEDURE — 99214 PR OFFICE/OUTPT VISIT, EST, LEVL IV, 30-39 MIN: ICD-10-PCS | Mod: 25,S$GLB,,

## 2022-09-20 PROCEDURE — 99999 PR PBB SHADOW E&M-EST. PATIENT-LVL IV: ICD-10-PCS | Mod: PBBFAC,,,

## 2022-09-20 PROCEDURE — 96372 THER/PROPH/DIAG INJ SC/IM: CPT | Mod: S$GLB,,,

## 2022-09-20 PROCEDURE — 99214 OFFICE O/P EST MOD 30 MIN: CPT | Mod: 25,S$GLB,,

## 2022-09-20 PROCEDURE — 3078F PR MOST RECENT DIASTOLIC BLOOD PRESSURE < 80 MM HG: ICD-10-PCS | Mod: CPTII,S$GLB,,

## 2022-09-20 PROCEDURE — 3074F SYST BP LT 130 MM HG: CPT | Mod: CPTII,S$GLB,,

## 2022-09-20 PROCEDURE — 96372 PR INJECTION,THERAP/PROPH/DIAG2ST, IM OR SUBCUT: ICD-10-PCS | Mod: S$GLB,,,

## 2022-09-20 PROCEDURE — 1160F PR REVIEW ALL MEDS BY PRESCRIBER/CLIN PHARMACIST DOCUMENTED: ICD-10-PCS | Mod: CPTII,S$GLB,,

## 2022-09-20 PROCEDURE — 3074F PR MOST RECENT SYSTOLIC BLOOD PRESSURE < 130 MM HG: ICD-10-PCS | Mod: CPTII,S$GLB,,

## 2022-09-20 PROCEDURE — 99999 PR PBB SHADOW E&M-EST. PATIENT-LVL IV: CPT | Mod: PBBFAC,,,

## 2022-09-20 PROCEDURE — 1160F RVW MEDS BY RX/DR IN RCRD: CPT | Mod: CPTII,S$GLB,,

## 2022-09-20 PROCEDURE — 3008F BODY MASS INDEX DOCD: CPT | Mod: CPTII,S$GLB,,

## 2022-09-20 PROCEDURE — 1159F PR MEDICATION LIST DOCUMENTED IN MEDICAL RECORD: ICD-10-PCS | Mod: CPTII,S$GLB,,

## 2022-09-20 PROCEDURE — 3078F DIAST BP <80 MM HG: CPT | Mod: CPTII,S$GLB,,

## 2022-09-20 PROCEDURE — 3008F PR BODY MASS INDEX (BMI) DOCUMENTED: ICD-10-PCS | Mod: CPTII,S$GLB,,

## 2022-09-20 PROCEDURE — 1159F MED LIST DOCD IN RCRD: CPT | Mod: CPTII,S$GLB,,

## 2022-09-20 RX ORDER — DEXAMETHASONE SODIUM PHOSPHATE 100 MG/10ML
10 INJECTION INTRAMUSCULAR; INTRAVENOUS ONCE
Status: COMPLETED | OUTPATIENT
Start: 2022-09-20 | End: 2022-09-20

## 2022-09-20 RX ORDER — DEXAMETHASONE SODIUM PHOSPHATE 10 MG/ML
10 INJECTION INTRAMUSCULAR; INTRAVENOUS
Status: DISCONTINUED | OUTPATIENT
Start: 2022-09-20 | End: 2022-09-20

## 2022-09-20 RX ORDER — LEVOCETIRIZINE DIHYDROCHLORIDE 5 MG/1
5 TABLET, FILM COATED ORAL NIGHTLY
Qty: 90 TABLET | Refills: 1 | Status: SHIPPED | OUTPATIENT
Start: 2022-09-20 | End: 2024-02-16

## 2022-09-20 RX ADMIN — DEXAMETHASONE SODIUM PHOSPHATE 10 MG: 100 INJECTION INTRAMUSCULAR; INTRAVENOUS at 04:09

## 2022-09-20 NOTE — PROGRESS NOTES
Shasta Rivera  09/20/2022  [id number]    Mary Redmond MD  Patient Care Team:  Mary Redmond MD as PCP - General (Family Medicine)  Kolton Alvarez MD as Consulting Physician (Pain Medicine)  Nesha Sanchez LPN as Care Coordinator (Internal Medicine)          Visit Type:an urgent visit for a new problem    Chief Complaint:  Chief Complaint   Patient presents with    Fatigue    Dizziness    Sinus Problem    Sore Throat    Cough    Hoarse       History of Present Illness:    She started having symptoms on yesterday  Did a home COVID test last night and it was negative  Having fatigue, sore throat  Takes Zyrtec, Sudafed, and flonase daily     History:  Past Medical History:   Diagnosis Date    Abnormal cervical Pap smear with positive HPV DNA test     Allergic rhinitis     Anemia     Herpes simplex type 2 infection     Onychomycosis     SAMIRA on CPAP     DOES NOT USE    Raynaud's phenomenon     Uterine fibroid      Past Surgical History:   Procedure Laterality Date    DILATION AND CURETTAGE OF UTERUS      for EAB    LUMBAR EPIDURAL INJECTION      MYOMECTOMY N/A 10/11/2021    Procedure: MYOMECTOMY;  Surgeon: Carol Ann Casarez MD;  Location: [facility];  Service: OB/GYN;  Laterality: N/A;     Family History   Problem Relation Age of Onset    Hypertension Mother     Hyperlipidemia Mother     Stroke Maternal Grandfather     Hyperlipidemia Father     Prostate cancer Father     Diabetes Paternal Grandmother     Hypertension Paternal Grandmother     Uterine cancer Paternal Grandmother     Colon cancer Maternal Grandmother     Sleep apnea Brother      Social History     Socioeconomic History    Marital status:    Occupational History     Employer: [employer]   Tobacco Use    Smoking status: Never    Smokeless tobacco: Never   Substance and Sexual Activity    Alcohol use: Yes     Alcohol/week: 7.0 standard drinks     Types: 7 Glasses of wine per week    Drug use: No    Sexual activity: Yes     Partners: Male   Social  History Narrative    The patient is  who lives alone and works as a .  She has a Masters in Civil Engineering.     Patient Active Problem List   Diagnosis    Allergic rhinitis    Uterine fibroid    Primary insomnia    SAMIRA (obstructive sleep apnea)    MAYE (generalized anxiety disorder)    Menorrhagia with regular cycle    S/P myomectomy     Review of patient's allergies indicates:  No Known Allergies    The following were reviewed at this visit: active problem list, medication list, allergies, family history, social history, and health maintenance.    Medications:  Current Outpatient Medications on File Prior to Visit   Medication Sig Dispense Refill    ALPRAZolam (XANAX) 0.25 MG tablet TAKE 1-2 TABLETS BY MOUTH 3 TIMES DAILY AS NEEDED FOR ANXIETY. 30 tablet 3    cetirizine (ZYRTEC) 10 MG tablet Take 1 tablet (10 mg total) by mouth once daily. 30 tablet 5    clobetasoL (CLOBEX) 0.05 % shampoo Apply topically.      fluconazole (DIFLUCAN) 150 MG Tab Take 1 tablet today and then take 1 tablet in 72 hours. 2 tablet 0    fluticasone propionate (FLONASE) 50 mcg/actuation nasal spray 2 sprays (100 mcg total) by Each Nostril route once daily at 6am. 16 g 9    HYDROcodone-acetaminophen (NORCO)  mg per tablet Take 1 tablet by mouth 4 (four) times daily as needed.      ibuprofen (ADVIL,MOTRIN) 800 MG tablet Take 1 tablet (800 mg total) by mouth every 8 (eight) hours. 30 tablet 0    multivitamin capsule Take 1 capsule by mouth once daily.      pseudoephedrine (12 HOUR DECONGESTANT) 120 mg 12 hr tablet Take 1 tablet (120 mg total) by mouth 2 (two) times daily. 60 tablet 5    RETIN-A MICRO PUMP 0.06 % GlwP Apply 1 application topically nightly.      valACYclovir (VALTREX) 500 MG tablet Take 1 tablet (500 mg total) by mouth once daily. 30 tablet 9    zolpidem (AMBIEN) 5 MG Tab Take 1 tablet (5 mg total) by mouth nightly as needed. 30 tablet 5    docusate sodium (COLACE) 100 MG capsule Take 1 capsule (100 mg  total) by mouth 2 (two) times daily. (Patient not taking: No sig reported) 30 capsule 0    polyethylene glycol (GLYCOLAX) 17 gram PwPk Take 17 g by mouth daily as needed. (Patient not taking: No sig reported)  0     No current facility-administered medications on file prior to visit.       Medications have been reviewed and reconciled with patient at this visit.  Barriers to medications reviewed with patient.    Adverse reactions to current medications reviewed with patient..    Over the counter medications reviewed and reconciled with patient.    Exam:  Wt Readings from Last 3 Encounters:   09/20/22 70 kg (154 lb 3.4 oz)   08/17/22 70.7 kg (155 lb 13.8 oz)   03/01/22 71 kg (156 lb 8.4 oz)     Temp Readings from Last 3 Encounters:   09/20/22 98.1 °F (36.7 °C) (Temporal)   03/01/22 98.3 °F (36.8 °C) (Temporal)   10/13/21 98 °F (36.7 °C) (Oral)     BP Readings from Last 3 Encounters:   09/20/22 118/74   08/17/22 118/74   03/01/22 116/80     Pulse Readings from Last 3 Encounters:   09/20/22 97   03/01/22 (!) 120   10/13/21 109     Body mass index is 24.89 kg/m².      Review of Systems   Constitutional:  Positive for malaise/fatigue.   HENT:  Positive for congestion, sinus pain and sore throat.    Respiratory:  Positive for cough. Negative for shortness of breath.    Cardiovascular:  Negative for chest pain.   Physical Exam  Vitals and nursing note reviewed.   Constitutional:       General: She is not in acute distress.     Appearance: She is well-developed. She is not diaphoretic.   HENT:      Head: Normocephalic and atraumatic.      Right Ear: Tympanic membrane and external ear normal.      Left Ear: Tympanic membrane and external ear normal.      Nose: Congestion and rhinorrhea present.      Right Sinus: Frontal sinus tenderness present.      Left Sinus: Frontal sinus tenderness present.      Mouth/Throat:      Mouth: Mucous membranes are moist.      Pharynx: Posterior oropharyngeal erythema present.   Eyes:       General:         Right eye: No discharge.         Left eye: No discharge.      Conjunctiva/sclera: Conjunctivae normal.      Pupils: Pupils are equal, round, and reactive to light.   Neck:      Thyroid: No thyromegaly.   Cardiovascular:      Rate and Rhythm: Normal rate and regular rhythm.      Heart sounds: Normal heart sounds. No murmur heard.  Pulmonary:      Effort: Pulmonary effort is normal. No respiratory distress.      Breath sounds: Normal breath sounds. No wheezing.   Abdominal:      General: Bowel sounds are normal.   Neurological:      Mental Status: She is alert and oriented to person, place, and time.   Psychiatric:         Behavior: Behavior normal.         Thought Content: Thought content normal.         Judgment: Judgment normal.       Laboratory Reviewed ({Yes)  Lab Results   Component Value Date    WBC 7.03 10/05/2021    HGB 12.2 10/05/2021    HCT 37.4 10/05/2021     10/05/2021    CHOL 176 07/31/2021    TRIG 35 07/31/2021    HDL 65 07/31/2021    ALT 13 07/31/2021    AST 19 07/31/2021     10/05/2021    K 4.1 10/05/2021     10/05/2021    CREATININE 0.9 10/05/2021    BUN 12 10/05/2021    CO2 27 10/05/2021    TSH 0.661 07/31/2021       Shasta was seen today for fatigue, dizziness, sinus problem, sore throat, cough and hoarse.    Diagnoses and all orders for this visit:    URI with cough and congestion  -     levocetirizine (XYZAL) 5 MG tablet; Take 1 tablet (5 mg total) by mouth every evening.  -     Discontinue: dexAMETHasone sodium phos (PF) injection 10 mg  -     dexamethasone injection 10 mg    Postnasal drip  -     levocetirizine (XYZAL) 5 MG tablet; Take 1 tablet (5 mg total) by mouth every evening.  -     Discontinue: dexAMETHasone sodium phos (PF) injection 10 mg  -     dexamethasone injection 10 mg    Pt will take a home COVID test this morning and send in results  Discussed switching antihistamines since she has been taking zyrtec for a while           Care Plan/Goals:  Reviewed    Goals    None         Follow up: No follow-ups on file.    After visit summary was printed and given to patient upon discharge today.  Patient goals and care plan are included in After Visit Summary.

## 2022-09-21 ENCOUNTER — PATIENT MESSAGE (OUTPATIENT)
Dept: FAMILY MEDICINE | Facility: CLINIC | Age: 42
End: 2022-09-21
Payer: COMMERCIAL

## 2022-09-22 ENCOUNTER — PATIENT MESSAGE (OUTPATIENT)
Dept: INTERNAL MEDICINE | Facility: CLINIC | Age: 42
End: 2022-09-22
Payer: COMMERCIAL

## 2022-09-22 RX ORDER — AZITHROMYCIN 250 MG/1
TABLET, FILM COATED ORAL
Qty: 6 TABLET | Refills: 0 | Status: SHIPPED | OUTPATIENT
Start: 2022-09-22 | End: 2022-09-27

## 2022-09-28 ENCOUNTER — HOSPITAL ENCOUNTER (OUTPATIENT)
Dept: RADIOLOGY | Facility: HOSPITAL | Age: 42
Discharge: HOME | End: 2022-09-28
Attending: FAMILY MEDICINE
Payer: COMMERCIAL

## 2022-09-28 DIAGNOSIS — Z12.31 ENCOUNTER FOR SCREENING MAMMOGRAM FOR BREAST CANCER: ICD-10-CM

## 2022-09-28 PROCEDURE — 77067 SCR MAMMO BI INCL CAD: CPT | Mod: 26,,, | Performed by: RADIOLOGY

## 2022-09-28 PROCEDURE — 77063 BREAST TOMOSYNTHESIS BI: CPT | Mod: TC

## 2022-09-28 PROCEDURE — 77067 SCR MAMMO BI INCL CAD: CPT | Mod: TC

## 2022-09-28 PROCEDURE — 77067 MAMMO DIGITAL SCREENING BILAT WITH TOMO: ICD-10-PCS | Mod: 26,,, | Performed by: RADIOLOGY

## 2022-09-28 PROCEDURE — 77063 MAMMO DIGITAL SCREENING BILAT WITH TOMO: ICD-10-PCS | Mod: 26,,, | Performed by: RADIOLOGY

## 2022-09-28 PROCEDURE — 77063 BREAST TOMOSYNTHESIS BI: CPT | Mod: 26,,, | Performed by: RADIOLOGY

## 2022-11-23 ENCOUNTER — PATIENT MESSAGE (OUTPATIENT)
Dept: FAMILY MEDICINE | Facility: CLINIC | Age: 42
End: 2022-11-23
Payer: COMMERCIAL

## 2022-11-23 DIAGNOSIS — J32.9 CHRONIC SINUSITIS, UNSPECIFIED LOCATION: Primary | ICD-10-CM

## 2022-11-28 ENCOUNTER — OFFICE VISIT (OUTPATIENT)
Dept: OTOLARYNGOLOGY | Facility: CLINIC | Age: 42
End: 2022-11-28
Payer: COMMERCIAL

## 2022-11-28 ENCOUNTER — LAB VISIT (OUTPATIENT)
Dept: LAB | Facility: HOSPITAL | Age: 42
End: 2022-11-28
Attending: OTOLARYNGOLOGY
Payer: COMMERCIAL

## 2022-11-28 VITALS
SYSTOLIC BLOOD PRESSURE: 130 MMHG | HEART RATE: 88 BPM | WEIGHT: 156.94 LBS | BODY MASS INDEX: 25.34 KG/M2 | DIASTOLIC BLOOD PRESSURE: 86 MMHG | TEMPERATURE: 98 F

## 2022-11-28 DIAGNOSIS — J30.89 NON-SEASONAL ALLERGIC RHINITIS, UNSPECIFIED TRIGGER: ICD-10-CM

## 2022-11-28 DIAGNOSIS — J01.41 ACUTE RECURRENT PANSINUSITIS: Primary | ICD-10-CM

## 2022-11-28 PROCEDURE — 3075F PR MOST RECENT SYSTOLIC BLOOD PRESS GE 130-139MM HG: ICD-10-PCS | Mod: CPTII,S$GLB,, | Performed by: OTOLARYNGOLOGY

## 2022-11-28 PROCEDURE — 99204 OFFICE O/P NEW MOD 45 MIN: CPT | Mod: S$GLB,,, | Performed by: OTOLARYNGOLOGY

## 2022-11-28 PROCEDURE — 99999 PR PBB SHADOW E&M-EST. PATIENT-LVL IV: CPT | Mod: PBBFAC,,, | Performed by: OTOLARYNGOLOGY

## 2022-11-28 PROCEDURE — 86003 ALLG SPEC IGE CRUDE XTRC EA: CPT | Performed by: OTOLARYNGOLOGY

## 2022-11-28 PROCEDURE — 36415 COLL VENOUS BLD VENIPUNCTURE: CPT | Performed by: OTOLARYNGOLOGY

## 2022-11-28 PROCEDURE — 3079F PR MOST RECENT DIASTOLIC BLOOD PRESSURE 80-89 MM HG: ICD-10-PCS | Mod: CPTII,S$GLB,, | Performed by: OTOLARYNGOLOGY

## 2022-11-28 PROCEDURE — 3079F DIAST BP 80-89 MM HG: CPT | Mod: CPTII,S$GLB,, | Performed by: OTOLARYNGOLOGY

## 2022-11-28 PROCEDURE — 86003 ALLG SPEC IGE CRUDE XTRC EA: CPT | Mod: 59 | Performed by: OTOLARYNGOLOGY

## 2022-11-28 PROCEDURE — 1159F PR MEDICATION LIST DOCUMENTED IN MEDICAL RECORD: ICD-10-PCS | Mod: CPTII,S$GLB,, | Performed by: OTOLARYNGOLOGY

## 2022-11-28 PROCEDURE — 99999 PR PBB SHADOW E&M-EST. PATIENT-LVL IV: ICD-10-PCS | Mod: PBBFAC,,, | Performed by: OTOLARYNGOLOGY

## 2022-11-28 PROCEDURE — 1159F MED LIST DOCD IN RCRD: CPT | Mod: CPTII,S$GLB,, | Performed by: OTOLARYNGOLOGY

## 2022-11-28 PROCEDURE — 3008F BODY MASS INDEX DOCD: CPT | Mod: CPTII,S$GLB,, | Performed by: OTOLARYNGOLOGY

## 2022-11-28 PROCEDURE — 99204 PR OFFICE/OUTPT VISIT, NEW, LEVL IV, 45-59 MIN: ICD-10-PCS | Mod: S$GLB,,, | Performed by: OTOLARYNGOLOGY

## 2022-11-28 PROCEDURE — 3075F SYST BP GE 130 - 139MM HG: CPT | Mod: CPTII,S$GLB,, | Performed by: OTOLARYNGOLOGY

## 2022-11-28 PROCEDURE — 3008F PR BODY MASS INDEX (BMI) DOCUMENTED: ICD-10-PCS | Mod: CPTII,S$GLB,, | Performed by: OTOLARYNGOLOGY

## 2022-11-28 NOTE — PROGRESS NOTES
Referring Provider:    Mary Redmond Md  [address] [id number]  Subjective:   Patient: Shasta Rivera [id number], :1980   Visit date:2022 11:27 AM    Chief Complaint:  Sinus Problem    HPI:    Prior notes reviewed by myself.  Clinical documentation obtained by nursing staff reviewed.     43 y/o female here for evaluation of chronic allergic rhinitis and recurrent acute sinusitis symptoms.  She is a long history of allergic rhinitis symptoms including congestion, rhinorrhea, postnasal drip.  Over the last 12 months she has had an increased number of acute flare ups consistent with recurrent acute sinusitis (3-4 all treated with antibiotics).  During this flare up she has congestion, facial pain, fever, discolored rhinorrhea.  She recently changed from Zyrtec to Xyzal and she uses Flonase on a regular basis as well as multiple different over-the-counter allergy medications.  She does not have any recent allergy testing or imaging.      Objective:     Physical Exam:  Vitals:  /86   Pulse 88   Temp 97.5 °F (36.4 °C) (Temporal)   Wt 71.2 kg (156 lb 15.5 oz)   LMP 2022   BMI 25.34 kg/m²   General appearance:  Well developed, well nourished    Ears:  Otoscopy of external auditory canals and tympanic membranes was normal, clinical speech reception thresholds grossly intact, no mass/lesion of auricle.    Nose:  No masses/lesions of external nose, congested nasal mucosa, septum, and turbinates were within normal limits. Clear rhinorrhea    Mouth:  No mass/lesion of lips, teeth, gums, hard/soft palate, tongue, tonsils, or oropharynx.    Neck & Lymphatics:  No cervical lymphadenopathy, no neck mass/crepitus/ asymmetry, trachea is midline, no thyroid enlargement/tenderness/mass.        [x]  Data Reviewed:    Lab Results   Component Value Date    WBC 7.03 10/05/2021    HGB 12.2 10/05/2021    HCT 37.4 10/05/2021    MCV 87 10/05/2021    EOSINOPHIL 2.6 10/05/2021              Assessment & Plan:   Acute recurrent pansinusitis    Non-seasonal allergic rhinitis, unspecified trigger  -     Ambulatory referral/consult to ENT  -     Aspergillus fumagatus IgE; Future; Expected date: 11/28/2022  -     Bermuda grass IgE; Future; Expected date: 11/28/2022  -     Cat epithelium IgE; Future; Expected date: 11/28/2022  -     Cladosporium IgE; Future; Expected date: 11/28/2022  -     Cockroach, American IgE; Future; Expected date: 11/28/2022  -     Horseheads, bald IgE; Future; Expected date: 11/28/2022  -     D. farinae IgE; Future; Expected date: 11/28/2022  -     D. pteronyssinus IgE; Future; Expected date: 11/28/2022  -     Plantain, English IgE; Future; Expected date: 11/28/2022  -     Fadi grass IgE; Future; Expected date: 11/28/2022  -     Simon elder, rough IgE; Future; Expected date: 11/28/2022  -     Mugwort IgE; Future; Expected date: 11/28/2022  -     Nettle IgE; Future; Expected date: 11/28/2022  -     Oak, white IgE; Future; Expected date: 11/28/2022  -     Penicillium IgE; Future; Expected date: 11/28/2022  -     Ragweed, short, common IgE; Future; Expected date: 11/28/2022  -     Edd IgE; Future; Expected date: 11/28/2022  -     Allergen, Cocklebur; Future; Expected date: 11/28/2022  -     Allergen, Elm Cedar; Future; Expected date: 11/28/2022  -     Allergen, Meadow Grass ([place] Blue); Future; Expected date: 11/28/2022  -     Allergen, Mucor Racemosus; Future; Expected date: 11/28/2022  -     Allergen, Pecan Hickory IgE; Future; Expected date: 11/28/2022  -     Allergen, White Darshan; Future; Expected date: 11/28/2022  -     Allergen-Alternaria Alternata; Future; Expected date: 11/28/2022  -     Allergen-Douglass; Future; Expected date: 11/28/2022  -     Allergen-Common Pigweed; Future; Expected date: 11/28/2022  -     Allergen-Silver Birch; Future; Expected date: 11/28/2022  -     Feather Panel #2; Future; Expected date: 11/28/2022  -     RAST Allergen for Eastern  [place]; Future; Expected date: 11/28/2022  -     RAST Allergen Maple ([place]); Future; Expected date: 11/28/2022  -     RAST Allergen Brogan; Future; Expected date: 11/28/2022  -     RAST Allergen, Lamb's Quarters; Future; Expected date: 11/28/2022  -     RAST Allergen, Sheep [place]ellow Dock); Future; Expected date: 11/28/2022      I recommended that she continue with her Xyzal and Flonase for now.  We discussed options including an allergy workup via allergy skin testing or RAST testing.  We agreed to move forward with RAST testing.  Today she is asymptomatic.    We discussed the option for sinus imaging but agreed to hold off on this for now.  We will discuss her allergy test results via telephone.

## 2022-11-30 LAB
AMER SYCAMORE IGE QN: <0.35 KU/L
FEATHER PANEL #2: <0.35 KU/L

## 2022-12-01 ENCOUNTER — TELEPHONE (OUTPATIENT)
Dept: OTOLARYNGOLOGY | Facility: CLINIC | Age: 42
End: 2022-12-01
Payer: COMMERCIAL

## 2022-12-01 LAB
A ALTERNATA IGE QN: <0.1 KU/L
A FUMIGATUS IGE QN: <0.1 KU/L
ALLERGEN BOXELDER MAPLE TREE IGE: <0.1 KU/L
ALLERGEN MAPLE (BOX ELDER) CLASS: NORMAL
ALLERGEN MULBERRY CLASS: NORMAL
ALLERGEN MULBERRY TREE IGE: <0.1 KU/L
ALLERGEN PIGWEED IGE: <0.1 KU/L
ALLERGEN WHITE ASH TREE IGE: <0.1 KU/L
BALD CYPRESS IGE QN: <0.1 KU/L
BERMUDA GRASS IGE QN: <0.1 KU/L
C HERBARUM IGE QN: <0.1 KU/L
CAT DANDER IGE QN: <0.1 KU/L
CEDAR IGE QN: <0.1 KU/L
COCKLEBUR IGE QN: <0.1 KU/L
COMMON PIGWEED CLASS: NORMAL
COMMON RAGWEED IGE QN: <0.1 KU/L
D FARINAE IGE QN: <0.1 KU/L
D PTERONYSS IGE QN: <0.1 KU/L
DEPRECATED A ALTERNATA IGE RAST QL: NORMAL
DEPRECATED A FUMIGATUS IGE RAST QL: NORMAL
DEPRECATED BALD CYPRESS IGE RAST QL: NORMAL
DEPRECATED BERMUDA GRASS IGE RAST QL: NORMAL
DEPRECATED C HERBARUM IGE RAST QL: NORMAL
DEPRECATED CAT DANDER IGE RAST QL: NORMAL
DEPRECATED CEDAR IGE RAST QL: NORMAL
DEPRECATED COCKLEBUR IGE RAST QL: NORMAL
DEPRECATED COMMON RAGWEED IGE RAST QL: NORMAL
DEPRECATED D FARINAE IGE RAST QL: NORMAL
DEPRECATED D PTERONYSS IGE RAST QL: NORMAL
DEPRECATED ELDER IGE RAST QL: NORMAL
DEPRECATED ENGL PLANTAIN IGE RAST QL: NORMAL
DEPRECATED GOOSEFOOT IGE RAST QL: NORMAL
DEPRECATED JOHNSON GRASS IGE RAST QL: NORMAL
DEPRECATED KENT BLUE GRASS IGE RAST QL: NORMAL
DEPRECATED M RACEMOSUS IGE RAST QL: NORMAL
DEPRECATED MUGWORT IGE RAST QL: NORMAL
DEPRECATED NETTLE IGE RAST QL: NORMAL
DEPRECATED P NOTATUM IGE RAST QL: NORMAL
DEPRECATED PECAN/HICK TREE IGE RAST QL: NORMAL
DEPRECATED ROACH IGE RAST QL: NORMAL
DEPRECATED SHEEP SORREL IGE RAST QL: NORMAL
DEPRECATED SILVER BIRCH IGE RAST QL: NORMAL
DEPRECATED TIMOTHY IGE RAST QL: NORMAL
DEPRECATED WHITE OAK IGE RAST QL: NORMAL
ELDER IGE QN: <0.1 KU/L
ELM CEDAR CLASS: NORMAL
ELM CEDAR, IGE: <0.1 KU/L
ENGL PLANTAIN IGE QN: <0.1 KU/L
GOOSEFOOT IGE QN: <0.1 KU/L
JOHNSON GRASS IGE QN: <0.1 KU/L
KENT BLUE GRASS IGE QN: <0.1 KU/L
M RACEMOSUS IGE QN: <0.1 KU/L
MUGWORT IGE QN: <0.1 KU/L
NETTLE IGE QN: <0.1 KU/L
P NOTATUM IGE QN: <0.1 KU/L
PECAN/HICK TREE IGE QN: <0.1 KU/L
ROACH IGE QN: <0.1 KU/L
SHEEP SORREL IGE QN: <0.1 KU/L
SILVER BIRCH IGE QN: <0.1 KU/L
TIMOTHY IGE QN: <0.1 KU/L
WHITE ASH CLASS: NORMAL
WHITE OAK IGE QN: <0.1 KU/L

## 2022-12-01 NOTE — TELEPHONE ENCOUNTER
----- Message from Prashant Luevano MD sent at 12/1/2022  4:32 PM CST -----  Your allergy tests were all normal.

## 2023-01-18 ENCOUNTER — PATIENT MESSAGE (OUTPATIENT)
Dept: SLEEP MEDICINE | Facility: CLINIC | Age: 43
End: 2023-01-18
Payer: COMMERCIAL

## 2023-01-19 ENCOUNTER — PATIENT MESSAGE (OUTPATIENT)
Dept: OBSTETRICS AND GYNECOLOGY | Facility: CLINIC | Age: 43
End: 2023-01-19
Payer: COMMERCIAL

## 2023-03-27 RX ORDER — PSEUDOEPHEDRINE HCL 120 MG/1
120 TABLET, FILM COATED, EXTENDED RELEASE ORAL 2 TIMES DAILY
Qty: 60 TABLET | Refills: 0 | Status: SHIPPED | OUTPATIENT
Start: 2023-03-27 | End: 2023-04-05 | Stop reason: SDUPTHER

## 2023-03-27 NOTE — TELEPHONE ENCOUNTER
No new care gaps identified.  [facility] Embedded Care Gaps. Reference number: [id number]. 3/27/2023   2:40:48 PM CDT

## 2023-04-05 RX ORDER — PSEUDOEPHEDRINE HCL 120 MG/1
120 TABLET, FILM COATED, EXTENDED RELEASE ORAL 2 TIMES DAILY
Qty: 60 TABLET | Refills: 0 | Status: SHIPPED | OUTPATIENT
Start: 2023-04-05 | End: 2023-07-14

## 2023-04-05 NOTE — TELEPHONE ENCOUNTER
No new care gaps identified.  [facility] Embedded Care Gaps. Reference number: [id number]. 4/05/2023   9:17:47 AM LUCIANOT

## 2023-04-05 NOTE — TELEPHONE ENCOUNTER
Lv: 3/1/22 & Annual    Upcoming appt with pcp 5/23/23 Annual    Pt states:    I have an appointment set for may 23.  Could you pmease refill for two months?  They do not allow mw to purchase more tnan one box at a time which causes me to have to go to the pharmacy weekly or run out.  This RX is ao that I can have a months worth of meds.  If I dont take i get sinus infections.

## 2023-05-20 NOTE — TELEPHONE ENCOUNTER
Refill Routing Note   Medication(s) are not appropriate for processing by [facility] for the following reason(s):      Medication outside of protocol  Required labs outdated    [facility] action(s):  Defer  Route Labs due            Appointments  past 12m or future 3m with PCP    Date Provider   Last Visit   3/1/2022 Mary Redmond MD   Next Visit   5/23/2023 Mary Redmond MD   ED visits in past 90 days: 0        Note composed:4:58 AM 05/20/2023

## 2023-05-20 NOTE — TELEPHONE ENCOUNTER
Care Due:                  Date            Visit Type   Department     Provider  --------------------------------------------------------------------------------                                MYCHART                              ANNUAL                              CHECKUP/PHY  [facility] FAMILY  Last Visit: 03-      S            MEDICINE       Mary Redmond                              MYCHART                              ANNUAL                              CHECKUP/PHY  [facility]  Next Visit: 05-      [facility]       Mary YOO  Murator                                                            Last  Test          Frequency    Reason                     Performed    Due Date  --------------------------------------------------------------------------------    CBC.........  12 months..  valACYclovir.............  10-   10-    Cr..........  12 months..  valACYclovir.............  10-   10-    [employer] Embedded Care Due Messages. Reference number: [id number].   5/20/2023 1:08:41 AM CDT

## 2023-05-21 RX ORDER — VALACYCLOVIR HYDROCHLORIDE 500 MG/1
TABLET, FILM COATED ORAL
Qty: 30 TABLET | Refills: 0 | Status: SHIPPED | OUTPATIENT
Start: 2023-05-21 | End: 2023-06-14

## 2023-05-21 RX ORDER — ZOLPIDEM TARTRATE 5 MG/1
TABLET ORAL
Qty: 30 TABLET | Refills: 0 | Status: SHIPPED | OUTPATIENT
Start: 2023-05-21 | End: 2023-06-27

## 2023-05-22 ENCOUNTER — OFFICE VISIT (OUTPATIENT)
Dept: OTOLARYNGOLOGY | Facility: CLINIC | Age: 43
End: 2023-05-22
Payer: COMMERCIAL

## 2023-05-22 VITALS
HEART RATE: 98 BPM | WEIGHT: 158.5 LBS | SYSTOLIC BLOOD PRESSURE: 148 MMHG | DIASTOLIC BLOOD PRESSURE: 87 MMHG | HEIGHT: 66 IN | BODY MASS INDEX: 25.47 KG/M2 | TEMPERATURE: 99 F

## 2023-05-22 DIAGNOSIS — J01.41 ACUTE RECURRENT PANSINUSITIS: Primary | ICD-10-CM

## 2023-05-22 DIAGNOSIS — J30.89 NON-SEASONAL ALLERGIC RHINITIS, UNSPECIFIED TRIGGER: ICD-10-CM

## 2023-05-22 PROCEDURE — 3008F PR BODY MASS INDEX (BMI) DOCUMENTED: ICD-10-PCS | Mod: CPTII,S$GLB,, | Performed by: OTOLARYNGOLOGY

## 2023-05-22 PROCEDURE — 3077F PR MOST RECENT SYSTOLIC BLOOD PRESSURE >= 140 MM HG: ICD-10-PCS | Mod: CPTII,S$GLB,, | Performed by: OTOLARYNGOLOGY

## 2023-05-22 PROCEDURE — 99214 PR OFFICE/OUTPT VISIT, EST, LEVL IV, 30-39 MIN: ICD-10-PCS | Mod: S$GLB,,, | Performed by: OTOLARYNGOLOGY

## 2023-05-22 PROCEDURE — 99214 OFFICE O/P EST MOD 30 MIN: CPT | Mod: S$GLB,,, | Performed by: OTOLARYNGOLOGY

## 2023-05-22 PROCEDURE — 99999 PR PBB SHADOW E&M-EST. PATIENT-LVL IV: ICD-10-PCS | Mod: PBBFAC,,, | Performed by: OTOLARYNGOLOGY

## 2023-05-22 PROCEDURE — 3079F PR MOST RECENT DIASTOLIC BLOOD PRESSURE 80-89 MM HG: ICD-10-PCS | Mod: CPTII,S$GLB,, | Performed by: OTOLARYNGOLOGY

## 2023-05-22 PROCEDURE — 3079F DIAST BP 80-89 MM HG: CPT | Mod: CPTII,S$GLB,, | Performed by: OTOLARYNGOLOGY

## 2023-05-22 PROCEDURE — 3008F BODY MASS INDEX DOCD: CPT | Mod: CPTII,S$GLB,, | Performed by: OTOLARYNGOLOGY

## 2023-05-22 PROCEDURE — 1159F MED LIST DOCD IN RCRD: CPT | Mod: CPTII,S$GLB,, | Performed by: OTOLARYNGOLOGY

## 2023-05-22 PROCEDURE — 3077F SYST BP >= 140 MM HG: CPT | Mod: CPTII,S$GLB,, | Performed by: OTOLARYNGOLOGY

## 2023-05-22 PROCEDURE — 99999 PR PBB SHADOW E&M-EST. PATIENT-LVL IV: CPT | Mod: PBBFAC,,, | Performed by: OTOLARYNGOLOGY

## 2023-05-22 PROCEDURE — 1159F PR MEDICATION LIST DOCUMENTED IN MEDICAL RECORD: ICD-10-PCS | Mod: CPTII,S$GLB,, | Performed by: OTOLARYNGOLOGY

## 2023-05-22 RX ORDER — METHYLPREDNISOLONE 4 MG/1
TABLET ORAL
Qty: 1 EACH | Refills: 0 | Status: SHIPPED | OUTPATIENT
Start: 2023-05-22 | End: 2023-09-20

## 2023-05-22 RX ORDER — AMOXICILLIN AND CLAVULANATE POTASSIUM 875; 125 MG/1; MG/1
1 TABLET, FILM COATED ORAL 2 TIMES DAILY
Qty: 20 TABLET | Refills: 0 | Status: SHIPPED | OUTPATIENT
Start: 2023-05-22 | End: 2023-06-01

## 2023-05-22 NOTE — PROGRESS NOTES
"Referring Provider:    No referring provider defined for this encounter.  Subjective:   Patient: Shasta Rivera [id number], :1980   Visit date:2023 11:27 AM    Chief Complaint:  Sinusitis      HPI:    Prior notes reviewed by myself.  Clinical documentation obtained by nursing staff reviewed.     41 y/o female here for evaluation of chronic allergic rhinitis and recurrent acute sinusitis symptoms.  She is a long history of allergic rhinitis symptoms including congestion, rhinorrhea, postnasal drip.  Over the last 12 months she has had an increased number of acute flare ups consistent with recurrent acute sinusitis (3-4 all treated with antibiotics).  During this flare up she has congestion, facial pain, fever, discolored rhinorrhea.  She recently changed from Zyrtec to Xyzal and she uses Flonase on a regular basis as well as multiple different over-the-counter allergy medications.  She does not have any recent allergy testing or imaging.    23 update:  Having 10+ days of sinusitis symptoms including discolored drainage, congestion, malaise.        Objective:     Physical Exam:  Vitals:  BP (!) 148/87 (BP Location: Left arm, Patient Position: Sitting)   Pulse 98   Temp 98.7 °F (37.1 °C)   Ht 5' 6" (1.676 m)   Wt 71.9 kg (158 lb 8.2 oz)   BMI 25.58 kg/m²   General appearance:  Well developed, well nourished    Ears:  Otoscopy of external auditory canals and tympanic membranes was normal, clinical speech reception thresholds grossly intact, no mass/lesion of auricle.    Nose:  No masses/lesions of external nose, congested nasal mucosa, septum, and turbinates were within normal limits. Clear rhinorrhea    Mouth:  No mass/lesion of lips, teeth, gums, hard/soft palate, tongue, tonsils, or oropharynx.    Neck & Lymphatics:  No cervical lymphadenopathy, no neck mass/crepitus/ asymmetry, trachea is midline, no thyroid enlargement/tenderness/mass.        [x]  Data Reviewed:    Lab Results   Component " "Value Date    WBC 7.03 10/05/2021    HGB 12.2 10/05/2021    HCT 37.4 10/05/2021    MCV 87 10/05/2021    EOSINOPHIL 2.6 10/05/2021       Reviewed RAST test results - [facility]      Assessment & Plan:   Acute recurrent pansinusitis  -     amoxicillin-clavulanate 875-125mg (AUGMENTIN) 875-125 mg per tablet; Take 1 tablet by mouth 2 (two) times daily. for 10 days  Dispense: 20 tablet; Refill: 0  -     methylPREDNISolone (MEDROL DOSEPACK) 4 mg tablet; use as directed  Dispense: 1 each; Refill: 0    Non-seasonal allergic rhinitis, unspecified trigger          I recommended that she continue with her Xyzal and Flonase for now.  We discussed options including an allergy workup via allergy skin testing or RAST testing.  We agreed to move forward with RAST testing.  Today she is asymptomatic.    We discussed the option for sinus imaging but agreed to hold off on this for now.  We will discuss her allergy test results via telephone.    5/22/23 update:  She has been having roughly 10 days of sinonasal symptoms as noted above.  We agreed on a round of Augmentin and Medrol Dosepak as well as my sinus protocol.  We discussed once again the option for dedicated sinus imaging.  She will consider this for now and get back to me with her decision.      Dr. Luevano's Sinus Protocol    Sinusitis is caused by inflammation creating blockage in the natural drainage pathways of the sinuses.  This "Sinus Protocol" is designed to open, flush out and decrease the inflammation within those pathways.      Day 1-3 (Perform 2x per day)     Afrin (pump spray mist OTC) 2 sprays in each nostril   Jose Med Sinus Wash - Make sure you use distilled water!  Fluticasone nasal spray 2 sprays in each nostril      Days 4 - follow up visit (perform 1x per day)    Jose Med Sinus wash  Fluticasone nasal spray 2 sprays in each nostril        *TAKE ALL OTHER MEDICATIONS AS DIRECTED BY DR LUEVANO  *MAKE SURE YOU STOP USING AFRIN AFTER THE 3RD DAY AS THERE IS A RISK OF " BECOMING DEPENDENT ON THAT MEDICATION

## 2023-05-23 ENCOUNTER — PATIENT MESSAGE (OUTPATIENT)
Dept: SLEEP MEDICINE | Facility: CLINIC | Age: 43
End: 2023-05-23
Payer: COMMERCIAL

## 2023-05-23 ENCOUNTER — OFFICE VISIT (OUTPATIENT)
Dept: FAMILY MEDICINE | Facility: CLINIC | Age: 43
End: 2023-05-23
Payer: COMMERCIAL

## 2023-05-23 ENCOUNTER — LAB VISIT (OUTPATIENT)
Dept: LAB | Facility: HOSPITAL | Age: 43
End: 2023-05-23
Attending: FAMILY MEDICINE
Payer: COMMERCIAL

## 2023-05-23 VITALS
OXYGEN SATURATION: 98 % | TEMPERATURE: 99 F | SYSTOLIC BLOOD PRESSURE: 138 MMHG | HEIGHT: 66 IN | BODY MASS INDEX: 25.1 KG/M2 | HEART RATE: 79 BPM | WEIGHT: 156.19 LBS | DIASTOLIC BLOOD PRESSURE: 72 MMHG

## 2023-05-23 DIAGNOSIS — Z00.00 PREVENTATIVE HEALTH CARE: Primary | ICD-10-CM

## 2023-05-23 DIAGNOSIS — Z23 NEED FOR VACCINATION: ICD-10-CM

## 2023-05-23 DIAGNOSIS — F51.01 PRIMARY INSOMNIA: ICD-10-CM

## 2023-05-23 DIAGNOSIS — B00.9 HERPES SIMPLEX TYPE 2 INFECTION: ICD-10-CM

## 2023-05-23 DIAGNOSIS — M47.816 OSTEOARTHRITIS OF LUMBAR SPINE, UNSPECIFIED SPINAL OSTEOARTHRITIS COMPLICATION STATUS: ICD-10-CM

## 2023-05-23 DIAGNOSIS — J30.89 NON-SEASONAL ALLERGIC RHINITIS, UNSPECIFIED TRIGGER: ICD-10-CM

## 2023-05-23 DIAGNOSIS — Z00.00 PREVENTATIVE HEALTH CARE: ICD-10-CM

## 2023-05-23 LAB
ALBUMIN SERPL BCP-MCNC: 4.1 G/DL (ref 3.5–5.2)
ALP SERPL-CCNC: 65 U/L (ref 55–135)
ALT SERPL W/O P-5'-P-CCNC: 19 U/L (ref 10–44)
ANION GAP SERPL CALC-SCNC: 10 MMOL/L (ref 8–16)
AST SERPL-CCNC: 22 U/L (ref 10–40)
BASOPHILS # BLD AUTO: 0.03 K/UL (ref 0–0.2)
BASOPHILS NFR BLD: 0.3 % (ref 0–1.9)
BILIRUB SERPL-MCNC: 0.2 MG/DL (ref 0.1–1)
BUN SERPL-MCNC: 16 MG/DL (ref 6–20)
CALCIUM SERPL-MCNC: 10.2 MG/DL (ref 8.7–10.5)
CHLORIDE SERPL-SCNC: 104 MMOL/L (ref 95–110)
CHOLEST SERPL-MCNC: 201 MG/DL (ref 120–199)
CHOLEST/HDLC SERPL: 2.6 {RATIO} (ref 2–5)
CO2 SERPL-SCNC: 23 MMOL/L (ref 23–29)
CREAT SERPL-MCNC: 0.9 MG/DL (ref 0.5–1.4)
DIFFERENTIAL METHOD: ABNORMAL
EOSINOPHIL # BLD AUTO: 0 K/UL (ref 0–0.5)
EOSINOPHIL NFR BLD: 0 % (ref 0–8)
ERYTHROCYTE [DISTWIDTH] IN BLOOD BY AUTOMATED COUNT: 13.9 % (ref 11.5–14.5)
EST. GFR  (NO RACE VARIABLE): >60 ML/MIN/1.73 M^2
ESTIMATED AVG GLUCOSE: 100 MG/DL (ref 68–131)
GLUCOSE SERPL-MCNC: 132 MG/DL (ref 70–110)
HBA1C MFR BLD: 5.1 % (ref 4–5.6)
HCT VFR BLD AUTO: 40.5 % (ref 37–48.5)
HDLC SERPL-MCNC: 77 MG/DL (ref 40–75)
HDLC SERPL: 38.3 % (ref 20–50)
HGB BLD-MCNC: 13.1 G/DL (ref 12–16)
IMM GRANULOCYTES # BLD AUTO: 0.03 K/UL (ref 0–0.04)
IMM GRANULOCYTES NFR BLD AUTO: 0.3 % (ref 0–0.5)
LDLC SERPL CALC-MCNC: 117.6 MG/DL (ref 63–159)
LYMPHOCYTES # BLD AUTO: 1 K/UL (ref 1–4.8)
LYMPHOCYTES NFR BLD: 9 % (ref 18–48)
MCH RBC QN AUTO: 29.9 PG (ref 27–31)
MCHC RBC AUTO-ENTMCNC: 32.3 G/DL (ref 32–36)
MCV RBC AUTO: 93 FL (ref 82–98)
MONOCYTES # BLD AUTO: 0.1 K/UL (ref 0.3–1)
MONOCYTES NFR BLD: 1.2 % (ref 4–15)
NEUTROPHILS # BLD AUTO: 9.5 K/UL (ref 1.8–7.7)
NEUTROPHILS NFR BLD: 89.2 % (ref 38–73)
NONHDLC SERPL-MCNC: 124 MG/DL
NRBC BLD-RTO: 0 /100 WBC
PLATELET # BLD AUTO: 258 K/UL (ref 150–450)
PMV BLD AUTO: 11.8 FL (ref 9.2–12.9)
POTASSIUM SERPL-SCNC: 4.6 MMOL/L (ref 3.5–5.1)
PROT SERPL-MCNC: 8.7 G/DL (ref 6–8.4)
RBC # BLD AUTO: 4.38 M/UL (ref 4–5.4)
SODIUM SERPL-SCNC: 137 MMOL/L (ref 136–145)
TRIGL SERPL-MCNC: 32 MG/DL (ref 30–150)
WBC # BLD AUTO: 10.65 K/UL (ref 3.9–12.7)

## 2023-05-23 PROCEDURE — 3075F SYST BP GE 130 - 139MM HG: CPT | Mod: CPTII,S$GLB,, | Performed by: FAMILY MEDICINE

## 2023-05-23 PROCEDURE — 84439 ASSAY OF FREE THYROXINE: CPT | Performed by: FAMILY MEDICINE

## 2023-05-23 PROCEDURE — 36415 COLL VENOUS BLD VENIPUNCTURE: CPT | Mod: PO | Performed by: FAMILY MEDICINE

## 2023-05-23 PROCEDURE — 99396 PREV VISIT EST AGE 40-64: CPT | Mod: 25,S$GLB,, | Performed by: FAMILY MEDICINE

## 2023-05-23 PROCEDURE — 99396 PR PREVENTIVE VISIT,EST,40-64: ICD-10-PCS | Mod: 25,S$GLB,, | Performed by: FAMILY MEDICINE

## 2023-05-23 PROCEDURE — 3078F PR MOST RECENT DIASTOLIC BLOOD PRESSURE < 80 MM HG: ICD-10-PCS | Mod: CPTII,S$GLB,, | Performed by: FAMILY MEDICINE

## 2023-05-23 PROCEDURE — 3008F BODY MASS INDEX DOCD: CPT | Mod: CPTII,S$GLB,, | Performed by: FAMILY MEDICINE

## 2023-05-23 PROCEDURE — 1159F PR MEDICATION LIST DOCUMENTED IN MEDICAL RECORD: ICD-10-PCS | Mod: CPTII,S$GLB,, | Performed by: FAMILY MEDICINE

## 2023-05-23 PROCEDURE — 1160F PR REVIEW ALL MEDS BY PRESCRIBER/CLIN PHARMACIST DOCUMENTED: ICD-10-PCS | Mod: CPTII,S$GLB,, | Performed by: FAMILY MEDICINE

## 2023-05-23 PROCEDURE — 90471 TD VACCINE GREATER THAN OR EQUAL TO 7YO PRESERVATIVE FREE IM: ICD-10-PCS | Mod: S$GLB,,, | Performed by: FAMILY MEDICINE

## 2023-05-23 PROCEDURE — 83036 HEMOGLOBIN GLYCOSYLATED A1C: CPT | Performed by: FAMILY MEDICINE

## 2023-05-23 PROCEDURE — 1160F RVW MEDS BY RX/DR IN RCRD: CPT | Mod: CPTII,S$GLB,, | Performed by: FAMILY MEDICINE

## 2023-05-23 PROCEDURE — 85025 COMPLETE CBC W/AUTO DIFF WBC: CPT | Performed by: FAMILY MEDICINE

## 2023-05-23 PROCEDURE — 3075F PR MOST RECENT SYSTOLIC BLOOD PRESS GE 130-139MM HG: ICD-10-PCS | Mod: CPTII,S$GLB,, | Performed by: FAMILY MEDICINE

## 2023-05-23 PROCEDURE — 3078F DIAST BP <80 MM HG: CPT | Mod: CPTII,S$GLB,, | Performed by: FAMILY MEDICINE

## 2023-05-23 PROCEDURE — 99999 PR PBB SHADOW E&M-EST. PATIENT-LVL IV: ICD-10-PCS | Mod: PBBFAC,,, | Performed by: FAMILY MEDICINE

## 2023-05-23 PROCEDURE — 90714 TD VACC NO PRESV 7 YRS+ IM: CPT | Mod: S$GLB,,, | Performed by: FAMILY MEDICINE

## 2023-05-23 PROCEDURE — 90714 TD VACCINE GREATER THAN OR EQUAL TO 7YO PRESERVATIVE FREE IM: ICD-10-PCS | Mod: S$GLB,,, | Performed by: FAMILY MEDICINE

## 2023-05-23 PROCEDURE — 99999 PR PBB SHADOW E&M-EST. PATIENT-LVL IV: CPT | Mod: PBBFAC,,, | Performed by: FAMILY MEDICINE

## 2023-05-23 PROCEDURE — 80061 LIPID PANEL: CPT | Performed by: FAMILY MEDICINE

## 2023-05-23 PROCEDURE — 3008F PR BODY MASS INDEX (BMI) DOCUMENTED: ICD-10-PCS | Mod: CPTII,S$GLB,, | Performed by: FAMILY MEDICINE

## 2023-05-23 PROCEDURE — 80053 COMPREHEN METABOLIC PANEL: CPT | Performed by: FAMILY MEDICINE

## 2023-05-23 PROCEDURE — 84443 ASSAY THYROID STIM HORMONE: CPT | Performed by: FAMILY MEDICINE

## 2023-05-23 PROCEDURE — 90471 IMMUNIZATION ADMIN: CPT | Mod: S$GLB,,, | Performed by: FAMILY MEDICINE

## 2023-05-23 PROCEDURE — 1159F MED LIST DOCD IN RCRD: CPT | Mod: CPTII,S$GLB,, | Performed by: FAMILY MEDICINE

## 2023-05-23 RX ORDER — BUPROPION HYDROCHLORIDE 100 MG/1
100 TABLET ORAL DAILY
Qty: 30 TABLET | Refills: 5 | Status: SHIPPED | OUTPATIENT
Start: 2023-05-23

## 2023-05-23 NOTE — PROGRESS NOTES
CHIEF COMPLAINT:  This is a 43-year-old female here for preventive health exam.     SUBJECTIVE:  Patient is doing well without complaints.  She has a history of anemia. She occasionally uses alprazolam for anxiety.  She takes zolpidem 5 mg for sleeplessness.  She takes pseudoephedrine and Zyrtec for allergic rhinitis.  The patient often has numbness and discoloration of finger tips especially when exposed to cold. Patient has positive serology for herpes simplex 2.  She  reports no past history of herpetic outbreaks.  Patient sees pain management for bulging discs and chronic back pain for which she takes opioids as needed. She had myomectomy in October 2021 and has regular menses which are not as heavy and painful as previous. The patient has had procedure to retrieve her eggs for future pregnancy. She complains of anorgasmia and requests advice. Orgasm has occurred only 5 times in the past.  Patient exercises twice weekly.     Eye exam 2020. Pap smear March 2021 due again in March 2026. Mammogram September 2022.  Tdap September 2012. Flu vaccine October 2023. COVID 19 vaccine March, April, October 2021, October 2022.     ROS:  GENERAL: Patient denies fever, chills, night sweats. Patient denies weight gain or loss. Patient denies anorexia, fatigue, weakness or swollen glands.  SKIN: Patient denies rash or hair loss.  HEENT: Patient denies sore throat, ear pain, hearing loss, nasal congestion, or runny nose. Patient denies visual disturbance, eye irritation or discharge.  LUNGS: Patient denies cough, wheeze or hemoptysis.  CARDIOVASCULAR: Patient denies chest pain, shortness of breath, palpitations, syncope or lower extremity edema.  GI: Patient denies abdominal pain, nausea, vomiting, diarrhea, constipation, blood in stool or melena.  GENITOURINARY: Patient denies pelvic pain, vaginal discharge, itch or odor. Patient denies irregular vaginal bleeding. Patient denies dysuria, frequency, hematuria, nocturia, urgency  or incontinence.  BREASTS: Patient denies breast pain, mass or nipple discharge.  MUSCULOSKELETAL: Patient denies joint pain, swelling, redness or warmth.  NEUROLOGIC: Patient denies headache, vertigo, paresthesias, weakness in limb, dysarthria, dysphagia or abnormality of gait.  PSYCHIATRIC: Patient denies anxiety, depression, or memory loss. Positive for insomnia.     OBJECTIVE:   GENERAL: Well-developed well-nourished black female alert and oriented x3, in no acute distress. Memory, judgment and cognition without deficit.   SKIN: Clear without rash. Normal color and tone.  HEENT: Eyes: Clear conjunctivae. Pupils equal reactive to light and accommodation. Ears: Clear TMs. Clear canals. Nose: Without congestion. Pharynx: Without injection or exudates.  NECK: Supple, normal range of motion. No masses, lymphadenopathy or enlarged thyroid. No JVD. Carotids 2+ and equal. No bruits.  LUNGS: Clear to auscultation. Normal respiratory effort.  CARDIOVASCULAR: Regular rhythm, normal S1, S2 without murmur, gallop or rub.  BACK: No CVA or spinal tenderness.  BREASTS: No masses, tenderness or nipple discharge.  ABDOMEN: Normal appearance. Active bowel sounds. Soft, nontender without mass or organomegaly. No rebound or guarding.  EXTREMITIES: Without cyanosis, clubbing or edema. Distal pulses 2+ and equal. Normal range of motion in all extremities. No joint effusion, erythema or warmth.  NEUROLOGIC: Cranial nerves II through XII without deficit. Motor strength equal bilaterally. Sensation normal to touch. Deep tendon reflexes 2+ and equal. Gait without abnormality. No tremor. Negative cerebellar signs.   PELVIC: External: Without lesions or inflammation.  Vaginal: Clear.  Cervix:  Normal appearance.  No motion tenderness.  No Pap  Uterus: Enlarged 6-8 week size and retroflexed.  Adnexa: Non-tender, without masses.  Rectovaginal:  Confirms.  Heme-negative stool x2.     ASSESSMENT:  1. Preventative health care    2. Primary  insomnia    3. Osteoarthritis of lumbar spine, unspecified spinal osteoarthritis complication status    4. Herpes simplex type 2 infection    5. Non-seasonal allergic rhinitis, unspecified trigger    6. Need for vaccination      PLAN:  1. Exercise regularly.  2. Age-appropriate counseling.  3. Fasting lab.  4. Trial of bupropion 100 mg daily for anorgasmia.  5. TD booster.    6.  Follow-up if no improvement or worsening symptoms.    This note is generated with speech recognition software and is subject to transcription error and sound alike phrases that may be missed by proofreading.

## 2023-05-24 LAB
T4 FREE SERPL-MCNC: 0.94 NG/DL (ref 0.71–1.51)
TSH SERPL DL<=0.005 MIU/L-ACNC: 0.17 UIU/ML (ref 0.4–4)

## 2023-05-25 ENCOUNTER — PATIENT MESSAGE (OUTPATIENT)
Dept: PULMONOLOGY | Facility: CLINIC | Age: 43
End: 2023-05-25
Payer: COMMERCIAL

## 2023-05-31 ENCOUNTER — OFFICE VISIT (OUTPATIENT)
Dept: SLEEP MEDICINE | Facility: CLINIC | Age: 43
End: 2023-05-31
Payer: COMMERCIAL

## 2023-05-31 VITALS
RESPIRATION RATE: 20 BRPM | DIASTOLIC BLOOD PRESSURE: 72 MMHG | OXYGEN SATURATION: 99 % | HEART RATE: 92 BPM | BODY MASS INDEX: 25.58 KG/M2 | WEIGHT: 159.19 LBS | HEIGHT: 66 IN | SYSTOLIC BLOOD PRESSURE: 120 MMHG

## 2023-05-31 DIAGNOSIS — F51.01 PRIMARY INSOMNIA: ICD-10-CM

## 2023-05-31 DIAGNOSIS — G47.33 OSA (OBSTRUCTIVE SLEEP APNEA): Primary | ICD-10-CM

## 2023-05-31 PROCEDURE — 3008F PR BODY MASS INDEX (BMI) DOCUMENTED: ICD-10-PCS | Mod: CPTII,S$GLB,, | Performed by: NURSE PRACTITIONER

## 2023-05-31 PROCEDURE — 3044F HG A1C LEVEL LT 7.0%: CPT | Mod: CPTII,S$GLB,, | Performed by: NURSE PRACTITIONER

## 2023-05-31 PROCEDURE — 3078F DIAST BP <80 MM HG: CPT | Mod: CPTII,S$GLB,, | Performed by: NURSE PRACTITIONER

## 2023-05-31 PROCEDURE — 99213 OFFICE O/P EST LOW 20 MIN: CPT | Mod: S$GLB,,, | Performed by: NURSE PRACTITIONER

## 2023-05-31 PROCEDURE — 3008F BODY MASS INDEX DOCD: CPT | Mod: CPTII,S$GLB,, | Performed by: NURSE PRACTITIONER

## 2023-05-31 PROCEDURE — 3044F PR MOST RECENT HEMOGLOBIN A1C LEVEL <7.0%: ICD-10-PCS | Mod: CPTII,S$GLB,, | Performed by: NURSE PRACTITIONER

## 2023-05-31 PROCEDURE — 1159F MED LIST DOCD IN RCRD: CPT | Mod: CPTII,S$GLB,, | Performed by: NURSE PRACTITIONER

## 2023-05-31 PROCEDURE — 1160F PR REVIEW ALL MEDS BY PRESCRIBER/CLIN PHARMACIST DOCUMENTED: ICD-10-PCS | Mod: CPTII,S$GLB,, | Performed by: NURSE PRACTITIONER

## 2023-05-31 PROCEDURE — 99999 PR PBB SHADOW E&M-EST. PATIENT-LVL V: CPT | Mod: PBBFAC,,, | Performed by: NURSE PRACTITIONER

## 2023-05-31 PROCEDURE — 3074F SYST BP LT 130 MM HG: CPT | Mod: CPTII,S$GLB,, | Performed by: NURSE PRACTITIONER

## 2023-05-31 PROCEDURE — 1160F RVW MEDS BY RX/DR IN RCRD: CPT | Mod: CPTII,S$GLB,, | Performed by: NURSE PRACTITIONER

## 2023-05-31 PROCEDURE — 1159F PR MEDICATION LIST DOCUMENTED IN MEDICAL RECORD: ICD-10-PCS | Mod: CPTII,S$GLB,, | Performed by: NURSE PRACTITIONER

## 2023-05-31 PROCEDURE — 99213 PR OFFICE/OUTPT VISIT, EST, LEVL III, 20-29 MIN: ICD-10-PCS | Mod: S$GLB,,, | Performed by: NURSE PRACTITIONER

## 2023-05-31 PROCEDURE — 99999 PR PBB SHADOW E&M-EST. PATIENT-LVL V: ICD-10-PCS | Mod: PBBFAC,,, | Performed by: NURSE PRACTITIONER

## 2023-05-31 PROCEDURE — 3074F PR MOST RECENT SYSTOLIC BLOOD PRESSURE < 130 MM HG: ICD-10-PCS | Mod: CPTII,S$GLB,, | Performed by: NURSE PRACTITIONER

## 2023-05-31 PROCEDURE — 3078F PR MOST RECENT DIASTOLIC BLOOD PRESSURE < 80 MM HG: ICD-10-PCS | Mod: CPTII,S$GLB,, | Performed by: NURSE PRACTITIONER

## 2023-05-31 NOTE — PROGRESS NOTES
"Subjective:      Patient ID: Shasta Rivera is a 43 y.o. female.    Chief Complaint: Sleep Apnea    HPI  Presents to office for review of AutoPAP therapy. Patient states improved symptoms with use of AutoPAP. Sleeping more soundly. Waking up feeling more refreshed. Improved daytime sleepiness. Patient states she is benefiting from use of the AutoPAP.   Machine from 2016. She is interested in replacement.      Patient Active Problem List   Diagnosis    Allergic rhinitis    Uterine fibroid    Primary insomnia    SAMIRA (obstructive sleep apnea)    MAYE (generalized anxiety disorder)    Menorrhagia with regular cycle    S/P myomectomy    Herpes simplex type 2 infection     /72   Pulse 92   Resp 20   Ht 5' 6" (1.676 m)   Wt 72.2 kg (159 lb 2.8 oz)   LMP 04/21/2023   SpO2 99%   BMI 25.69 kg/m²   Body mass index is 25.69 kg/m².    Review of Systems   Constitutional: Negative.    HENT: Negative.     Respiratory: Negative.     Cardiovascular: Negative.    Musculoskeletal: Negative.    Gastrointestinal: Negative.    Neurological: Negative.    Psychiatric/Behavioral: Negative.     Objective:      Physical Exam  Constitutional:       Appearance: Normal appearance. She is well-developed.   HENT:      Head: Normocephalic and atraumatic.      Nose: Nose normal.   Cardiovascular:      Rate and Rhythm: Normal rate and regular rhythm.      Heart sounds: No murmur heard.    No gallop.   Pulmonary:      Effort: Pulmonary effort is normal.      Breath sounds: Normal breath sounds.   Musculoskeletal:         General: Normal range of motion.      Cervical back: Normal range of motion and neck supple.   Skin:     General: Skin is warm and dry.   Neurological:      Mental Status: She is alert and oriented to person, place, and time.   Psychiatric:         Mood and Affect: Mood normal.         Behavior: Behavior normal.     Personal Diagnostic Review        Assessment:       1. SAMIRA (obstructive sleep apnea)    2. Primary " insomnia        Outpatient Encounter Medications as of 5/31/2023   Medication Sig Dispense Refill    ALPRAZolam (XANAX) 0.25 MG tablet TAKE 1-2 TABLETS BY MOUTH 3 TIMES DAILY AS NEEDED FOR ANXIETY. 30 tablet 3    amoxicillin-clavulanate 875-125mg (AUGMENTIN) 875-125 mg per tablet Take 1 tablet by mouth 2 (two) times daily. for 10 days 20 tablet 0    buPROPion (WELLBUTRIN) 100 MG tablet Take 1 tablet (100 mg total) by mouth once daily. 30 tablet 5    clobetasoL (CLOBEX) 0.05 % shampoo Apply topically.      docusate sodium (COLACE) 100 MG capsule Take 1 capsule (100 mg total) by mouth 2 (two) times daily. 30 capsule 0    fluticasone propionate (FLONASE) 50 mcg/actuation nasal spray 2 sprays (100 mcg total) by Each Nostril route once daily at 6am. 16 g 9    HYDROcodone-acetaminophen (NORCO)  mg per tablet Take 1 tablet by mouth 4 (four) times daily as needed.      ibuprofen (ADVIL,MOTRIN) 800 MG tablet Take 1 tablet (800 mg total) by mouth every 8 (eight) hours. 30 tablet 0    levocetirizine (XYZAL) 5 MG tablet Take 1 tablet (5 mg total) by mouth every evening. 90 tablet 1    methylPREDNISolone (MEDROL DOSEPACK) 4 mg tablet use as directed 1 each 0    multivitamin capsule Take 1 capsule by mouth once daily.      polyethylene glycol (GLYCOLAX) 17 gram PwPk Take 17 g by mouth daily as needed.  0    pseudoephedrine (SUDAFED 12 HOUR) 120 mg 12 hr tablet Take 1 tablet (120 mg total) by mouth 2 (two) times daily. 60 tablet 0    RETIN-A MICRO PUMP 0.06 % GlwP Apply 1 application topically nightly.      valACYclovir (VALTREX) 500 MG tablet TAKE 1 TABLET BY MOUTH EVERY DAY 30 tablet 0    zolpidem (AMBIEN) 5 MG Tab TAKE 1 TABLET BY MOUTH EVERY DAY AT NIGHT AS NEEDED 30 tablet 0     No facility-administered encounter medications on file as of 5/31/2023.     Orders Placed This Encounter   Procedures    CPAP FOR HOME USE     Order Specific Question:   Length of need (1-99 months):     Answer:   99     Order Specific Question:    Type ():     Answer:   Auto CPAP     Order Specific Question:   Auto CPAP pressure setting range (cmH20):     Answer:   4-16     Order Specific Question:   Fulfillment Priority:     Answer:   Level 4:  all others     Order Specific Question:   Humidification ():     Answer:   Heated     Order Specific Question:   Choose ONE mask type and its corresponding cushions and/or pillows:     Answer:    Full Face Mask, 1 per 90 days:  Full Face Cushion, (3 per 90 days)     Order Specific Question:   Choose EITHER Heated or Non-Heated Tubjing     Answer:    Non-Heated Tubing, 1 per 90 days     Order Specific Question:   All other supplies as needed as listed below:     Answer:    Headgear, 1 per 180 days     Order Specific Question:   All other supplies as needed as listed below:     Answer:    Disposable Filter, 6 per 90 days     Order Specific Question:   All other supplies as needed as listed below:     Answer:    Non-Disposable Filter, 1 per 180 days     Order Specific Question:   All other supplies as needed as listed below:     Answer:    Humidifier Chamber, 1 per 180 days     Order Specific Question:   All other supplies as needed as listed below:     Answer:    Chin Strap, 1 per 180 days     Plan:        Problem List Items Addressed This Visit          Other    Primary insomnia    SAMIRA (obstructive sleep apnea) - Primary    Overview     Autopap         Relevant Orders    CPAP FOR HOME USE     AutoPAP and follow up in 10 weeks with download of data card and review of symptoms.  Insomnia stable.                 Elizabeth LeJeune, ACNP, ANP

## 2023-06-14 RX ORDER — VALACYCLOVIR HYDROCHLORIDE 500 MG/1
500 TABLET, FILM COATED ORAL DAILY
Qty: 90 TABLET | Refills: 3 | Status: SHIPPED | OUTPATIENT
Start: 2023-06-14

## 2023-06-14 NOTE — TELEPHONE ENCOUNTER
No care due was identified.  [facility] Embedded Care Due Messages. Reference number: [id number].   6/14/2023 9:33:58 AM CDT

## 2023-06-14 NOTE — TELEPHONE ENCOUNTER
Refill Decision Note   Shasta Rivera  is requesting a refill authorization.  Brief Assessment and Rationale for Refill:  Approve     Medication Therapy Plan:         Comments:     Note composed:12:39 PM 06/14/2023             Appointments     Last Visit   5/23/2023 Mary Redmond MD   Next Visit   Visit date not found Mary Redmond MD

## 2023-06-27 RX ORDER — ZOLPIDEM TARTRATE 5 MG/1
TABLET ORAL
Qty: 30 TABLET | Refills: 5 | Status: SHIPPED | OUTPATIENT
Start: 2023-06-27 | End: 2023-08-07

## 2023-06-27 NOTE — TELEPHONE ENCOUNTER
No care due was identified.  [facility] Embedded Care Due Messages. Reference number: [id number].   6/26/2023 9:10:28 PM CDT

## 2023-07-11 ENCOUNTER — OFFICE VISIT (OUTPATIENT)
Dept: FAMILY MEDICINE | Facility: CLINIC | Age: 43
End: 2023-07-11
Payer: COMMERCIAL

## 2023-07-11 DIAGNOSIS — U07.1 COVID-19: ICD-10-CM

## 2023-07-11 DIAGNOSIS — R52 BODY ACHES: ICD-10-CM

## 2023-07-11 DIAGNOSIS — R09.81 SINUS CONGESTION: Primary | ICD-10-CM

## 2023-07-11 DIAGNOSIS — R05.9 COUGH, UNSPECIFIED TYPE: ICD-10-CM

## 2023-07-11 PROCEDURE — 3044F HG A1C LEVEL LT 7.0%: CPT | Mod: CPTII,95,, | Performed by: NURSE PRACTITIONER

## 2023-07-11 PROCEDURE — 1159F PR MEDICATION LIST DOCUMENTED IN MEDICAL RECORD: ICD-10-PCS | Mod: CPTII,95,, | Performed by: NURSE PRACTITIONER

## 2023-07-11 PROCEDURE — 1160F PR REVIEW ALL MEDS BY PRESCRIBER/CLIN PHARMACIST DOCUMENTED: ICD-10-PCS | Mod: CPTII,95,, | Performed by: NURSE PRACTITIONER

## 2023-07-11 PROCEDURE — 3044F PR MOST RECENT HEMOGLOBIN A1C LEVEL <7.0%: ICD-10-PCS | Mod: CPTII,95,, | Performed by: NURSE PRACTITIONER

## 2023-07-11 PROCEDURE — 99214 OFFICE O/P EST MOD 30 MIN: CPT | Mod: 95,,, | Performed by: NURSE PRACTITIONER

## 2023-07-11 PROCEDURE — 99214 PR OFFICE/OUTPT VISIT, EST, LEVL IV, 30-39 MIN: ICD-10-PCS | Mod: 95,,, | Performed by: NURSE PRACTITIONER

## 2023-07-11 PROCEDURE — 1160F RVW MEDS BY RX/DR IN RCRD: CPT | Mod: CPTII,95,, | Performed by: NURSE PRACTITIONER

## 2023-07-11 PROCEDURE — 1159F MED LIST DOCD IN RCRD: CPT | Mod: CPTII,95,, | Performed by: NURSE PRACTITIONER

## 2023-07-11 RX ORDER — IBUPROFEN 800 MG/1
800 TABLET ORAL EVERY 8 HOURS
Qty: 30 TABLET | Refills: 0 | Status: SHIPPED | OUTPATIENT
Start: 2023-07-11

## 2023-07-11 RX ORDER — PROMETHAZINE HYDROCHLORIDE AND DEXTROMETHORPHAN HYDROBROMIDE 6.25; 15 MG/5ML; MG/5ML
10 SYRUP ORAL EVERY 6 HOURS PRN
Qty: 240 ML | Refills: 0 | Status: SHIPPED | OUTPATIENT
Start: 2023-07-11 | End: 2023-07-21

## 2023-07-11 RX ORDER — DEXBROMPHENIRAMINE MALEATE, PHENYLEPHRINE HYDROCHLORIDE 2; 7.5 MG/1; MG/1
1 TABLET ORAL EVERY 6 HOURS PRN
Qty: 30 TABLET | Refills: 2 | Status: SHIPPED | OUTPATIENT
Start: 2023-07-11 | End: 2023-07-14

## 2023-07-11 NOTE — LETTER
July 11, 2023    Shasta Rivera  [address] [address]             [facility]  Family Medicine  [address] [address]  Phone: [phone]  Fax: [phone]   July 11, 2023     Patient: Shasta Rivera   YOB: 1980   Date of Visit: 7/11/2023       To Whom it May Concern:    Shasta Rivera was seen in my clinic on 7/11/2023. She 7/17/23    Please excuse her from any classes or work missed.    If you have any questions or concerns, please don't hesitate to call.    Sincerely,         Gabriela Huitron, NP

## 2023-07-11 NOTE — LETTER
July 11, 2023    Shasta Rivera  [address] [id number]             [facility]  Family Medicine  [address] [address]  Phone: [phone]  Fax: [phone]   July 11, 2023     Patient: Shasta Rivera   YOB: 1980   Date of Visit: 7/11/2023       To Whom it May Concern:    Shasta Rivera was seen in my clinic on 7/11/2023. She may return to work on 7/17/2023 .    Please excuse her from any classes or work missed.    If you have any questions or concerns, please don't hesitate to call.    Sincerely,         Gabriela Huitron NP

## 2023-07-11 NOTE — PROGRESS NOTES
Shasta Rivera  07/11/2023  [id number]      The patient location is: car  The chief complaint leading to consultation is: coivd  Visit type: Virtual visit with synchronous audio and video  Total time spent with patient: 9 mins  Each patient to whom he or she provides medical services by telemedicine is:  (1) informed of the relationship between the physician and patient and the respective role of any other health care provider with respect to management of the patient; and (2) notified that he or she may decline to receive medical services by telemedicine and may withdraw from such care at any time.        Chief Complaint:      History of Present Illness:    44 yo female presents telemedicine with co body aches, fatigue, cough, throat pain, sinus congestion for 3 days. Pt denies fever, chills, sweats, CP, SOB, NVD, abdominal pain, , loss of taste or smell.   Patient reports Positive home covid test  No OTC medication for symptom relief    Review of Systems   Constitutional:  Positive for malaise/fatigue.   HENT:  Positive for congestion, sinus pain and sore throat. Negative for hearing loss.    Eyes:  Negative for discharge.   Respiratory:  Positive for cough. Negative for wheezing.    Cardiovascular:  Negative for chest pain and palpitations.   Gastrointestinal:  Negative for blood in stool, constipation, diarrhea and vomiting.   Genitourinary:  Negative for dysuria and hematuria.   Musculoskeletal:  Negative for neck pain.   Neurological:  Negative for weakness and headaches.   Endo/Heme/Allergies:  Negative for polydipsia.       History:  Past Medical History:   Diagnosis Date    Abnormal cervical Pap smear with positive HPV DNA test     Allergic rhinitis     Anemia     Herpes simplex type 2 infection     Onychomycosis     SAMIRA on CPAP     DOES NOT USE    Raynaud's phenomenon     Uterine fibroid      Past Surgical History:   Procedure Laterality Date    DILATION AND CURETTAGE OF UTERUS      for EAB    LUMBAR  EPIDURAL INJECTION      MYOMECTOMY N/A 10/11/2021    Procedure: MYOMECTOMY;  Surgeon: Carol Ann Casarez MD;  Location: [facility] OR;  Service: OB/GYN;  Laterality: N/A;       Family History   Problem Relation Age of Onset    Diabetes Mother     Hypertension Mother     Hyperlipidemia Mother     Colon cancer Father     Hyperlipidemia Father     Prostate cancer Father     Cancer Maternal Grandmother     Colon cancer Maternal Grandmother     Stroke Maternal Grandfather     Breast cancer Paternal Grandmother     Diabetes Paternal Grandmother     Hypertension Paternal Grandmother     Uterine cancer Paternal Grandmother     Sleep apnea Brother     Breast cancer Other      Social History     Socioeconomic History    Marital status:    Occupational History     Employer: [employer]   Tobacco Use    Smoking status: Never    Smokeless tobacco: Never   Substance and Sexual Activity    Alcohol use: Yes     Alcohol/week: 7.0 standard drinks     Types: 7 Glasses of wine per week    Drug use: No    Sexual activity: Yes     Partners: Male   Social History Narrative    The patient is  who lives alone and works as a .  She has a Masters in Civil Engineering.     Patient Active Problem List   Diagnosis    Allergic rhinitis    Uterine fibroid    Primary insomnia    SAMIRA (obstructive sleep apnea)    MAYE (generalized anxiety disorder)    Menorrhagia with regular cycle    S/P myomectomy    Herpes simplex type 2 infection     Review of patient's allergies indicates:  No Known Allergies    The following were reviewed at this visit: active problem list, medication list, allergies, family history, social history, and health maintenance.    Medications:  Current Outpatient Medications on File Prior to Visit   Medication Sig Dispense Refill    ALPRAZolam (XANAX) 0.25 MG tablet TAKE 1-2 TABLETS BY MOUTH 3 TIMES DAILY AS NEEDED FOR ANXIETY. 30 tablet 3    buPROPion (WELLBUTRIN) 100 MG tablet Take 1 tablet (100 mg total) by mouth once  daily. 30 tablet 5    clobetasoL (CLOBEX) 0.05 % shampoo Apply topically.      docusate sodium (COLACE) 100 MG capsule Take 1 capsule (100 mg total) by mouth 2 (two) times daily. 30 capsule 0    fluticasone propionate (FLONASE) 50 mcg/actuation nasal spray 2 sprays (100 mcg total) by Each Nostril route once daily at 6am. 16 g 9    HYDROcodone-acetaminophen (NORCO)  mg per tablet Take 1 tablet by mouth 4 (four) times daily as needed.      levocetirizine (XYZAL) 5 MG tablet Take 1 tablet (5 mg total) by mouth every evening. 90 tablet 1    methylPREDNISolone (MEDROL DOSEPACK) 4 mg tablet use as directed 1 each 0    multivitamin capsule Take 1 capsule by mouth once daily.      polyethylene glycol (GLYCOLAX) 17 gram PwPk Take 17 g by mouth daily as needed.  0    pseudoephedrine (SUDAFED 12 HOUR) 120 mg 12 hr tablet Take 1 tablet (120 mg total) by mouth 2 (two) times daily. 60 tablet 0    RETIN-A MICRO PUMP 0.06 % GlwP Apply 1 application topically nightly.      valACYclovir (VALTREX) 500 MG tablet Take 1 tablet (500 mg total) by mouth once daily. 90 tablet 3    zolpidem (AMBIEN) 5 MG Tab TAKE 1 TABLET BY MOUTH EVERY DAY AT NIGHT AS NEEDED 30 tablet 5    [DISCONTINUED] ibuprofen (ADVIL,MOTRIN) 800 MG tablet Take 1 tablet (800 mg total) by mouth every 8 (eight) hours. 30 tablet 0     No current facility-administered medications on file prior to visit.       Exam:  Wt Readings from Last 3 Encounters:   05/31/23 72.2 kg (159 lb 2.8 oz)   05/23/23 70.8 kg (156 lb 3.1 oz)   05/22/23 71.9 kg (158 lb 8.2 oz)     Temp Readings from Last 3 Encounters:   05/23/23 98.7 °F (37.1 °C)   05/22/23 98.7 °F (37.1 °C)   11/28/22 97.5 °F (36.4 °C) (Temporal)     BP Readings from Last 3 Encounters:   05/31/23 120/72   05/23/23 138/72   05/22/23 (!) 148/87     Pulse Readings from Last 3 Encounters:   05/31/23 92   05/23/23 79   05/22/23 98     There is no height or weight on file to calculate BMI.      @ROS@    Physical Exam  Vitals and  nursing note reviewed.   Constitutional:       Appearance: Normal appearance. She is normal weight.   Eyes:      Conjunctiva/sclera: Conjunctivae normal.   Pulmonary:      Effort: Pulmonary effort is normal.   Neurological:      General: No focal deficit present.      Mental Status: She is alert and oriented to person, place, and time.   Psychiatric:         Mood and Affect: Mood normal.         Behavior: Behavior normal.         Thought Content: Thought content normal.         Judgment: Judgment normal.       Laboratory Reviewed ({Yes)  Lab Results   Component Value Date    WBC 10.65 05/23/2023    HGB 13.1 05/23/2023    HCT 40.5 05/23/2023     05/23/2023    CHOL 201 (H) 05/23/2023    TRIG 32 05/23/2023    HDL 77 (H) 05/23/2023    ALT 19 05/23/2023    AST 22 05/23/2023     05/23/2023    K 4.6 05/23/2023     05/23/2023    CREATININE 0.9 05/23/2023    BUN 16 05/23/2023    CO2 23 05/23/2023    TSH 0.174 (L) 05/23/2023    HGBA1C 5.1 05/23/2023     Diagnoses and all orders for this visit:    Sinus congestion  -     dexbrompheniramine-phenyleph (ALAHIST PE) 2-7.5 mg Tab; Take 1 tablet by mouth every 6 (six) hours as needed (sinus congestion).    COVID-19  -     ibuprofen (ADVIL,MOTRIN) 800 MG tablet; Take 1 tablet (800 mg total) by mouth every 8 (eight) hours.    Body aches  -     ibuprofen (ADVIL,MOTRIN) 800 MG tablet; Take 1 tablet (800 mg total) by mouth every 8 (eight) hours.    Cough, unspecified type  -     promethazine-dextromethorphan (PROMETHAZINE-DM) 6.25-15 mg/5 mL Syrp; Take 10 mLs by mouth every 6 (six) hours as needed.       Diagnoses and all orders for this visit:    Sinus congestion  -     dexbrompheniramine-phenyleph (ALAHIST PE) 2-7.5 mg Tab; Take 1 tablet by mouth every 6 (six) hours as needed (sinus congestion).    COVID-19  -     ibuprofen (ADVIL,MOTRIN) 800 MG tablet; Take 1 tablet (800 mg total) by mouth every 8 (eight) hours.    Body aches  -     ibuprofen (ADVIL,MOTRIN) 800 MG  tablet; Take 1 tablet (800 mg total) by mouth every 8 (eight) hours.    Cough, unspecified type  -     promethazine-dextromethorphan (PROMETHAZINE-DM) 6.25-15 mg/5 mL Syrp; Take 10 mLs by mouth every 6 (six) hours as needed.

## 2023-07-14 RX ORDER — PSEUDOEPHEDRINE HCL 120 MG
TABLET, EXTENDED RELEASE ORAL
Qty: 60 TABLET | Refills: 5 | Status: SHIPPED | OUTPATIENT
Start: 2023-07-14 | End: 2023-08-03

## 2023-07-14 NOTE — TELEPHONE ENCOUNTER
No care due was identified.  [facility] Embedded Care Due Messages. Reference number: [id number].   7/13/2023 8:53:13 PM CDT

## 2023-08-03 ENCOUNTER — OFFICE VISIT (OUTPATIENT)
Dept: FAMILY MEDICINE | Facility: CLINIC | Age: 43
End: 2023-08-03
Payer: COMMERCIAL

## 2023-08-03 VITALS
HEART RATE: 95 BPM | WEIGHT: 155.19 LBS | OXYGEN SATURATION: 99 % | RESPIRATION RATE: 18 BRPM | SYSTOLIC BLOOD PRESSURE: 116 MMHG | HEIGHT: 60 IN | BODY MASS INDEX: 30.47 KG/M2 | DIASTOLIC BLOOD PRESSURE: 78 MMHG | TEMPERATURE: 99 F

## 2023-08-03 DIAGNOSIS — R09.81 SINUS CONGESTION: ICD-10-CM

## 2023-08-03 DIAGNOSIS — R49.0 HOARSENESS OF VOICE: ICD-10-CM

## 2023-08-03 DIAGNOSIS — R05.9 COUGH, UNSPECIFIED TYPE: ICD-10-CM

## 2023-08-03 DIAGNOSIS — R07.0 THROAT PAIN: Primary | ICD-10-CM

## 2023-08-03 PROCEDURE — 3008F BODY MASS INDEX DOCD: CPT | Mod: CPTII,S$GLB,, | Performed by: NURSE PRACTITIONER

## 2023-08-03 PROCEDURE — 3008F PR BODY MASS INDEX (BMI) DOCUMENTED: ICD-10-PCS | Mod: CPTII,S$GLB,, | Performed by: NURSE PRACTITIONER

## 2023-08-03 PROCEDURE — 96372 THER/PROPH/DIAG INJ SC/IM: CPT | Mod: S$GLB,,, | Performed by: NURSE PRACTITIONER

## 2023-08-03 PROCEDURE — 1159F MED LIST DOCD IN RCRD: CPT | Mod: CPTII,S$GLB,, | Performed by: NURSE PRACTITIONER

## 2023-08-03 PROCEDURE — 3078F DIAST BP <80 MM HG: CPT | Mod: CPTII,S$GLB,, | Performed by: NURSE PRACTITIONER

## 2023-08-03 PROCEDURE — 99999 PR PBB SHADOW E&M-EST. PATIENT-LVL V: CPT | Mod: PBBFAC,,, | Performed by: NURSE PRACTITIONER

## 2023-08-03 PROCEDURE — 96372 PR INJECTION,THERAP/PROPH/DIAG2ST, IM OR SUBCUT: ICD-10-PCS | Mod: S$GLB,,, | Performed by: NURSE PRACTITIONER

## 2023-08-03 PROCEDURE — 3074F SYST BP LT 130 MM HG: CPT | Mod: CPTII,S$GLB,, | Performed by: NURSE PRACTITIONER

## 2023-08-03 PROCEDURE — 99214 OFFICE O/P EST MOD 30 MIN: CPT | Mod: 25,S$GLB,, | Performed by: NURSE PRACTITIONER

## 2023-08-03 PROCEDURE — 3074F PR MOST RECENT SYSTOLIC BLOOD PRESSURE < 130 MM HG: ICD-10-PCS | Mod: CPTII,S$GLB,, | Performed by: NURSE PRACTITIONER

## 2023-08-03 PROCEDURE — 99214 PR OFFICE/OUTPT VISIT, EST, LEVL IV, 30-39 MIN: ICD-10-PCS | Mod: 25,S$GLB,, | Performed by: NURSE PRACTITIONER

## 2023-08-03 PROCEDURE — 1160F RVW MEDS BY RX/DR IN RCRD: CPT | Mod: CPTII,S$GLB,, | Performed by: NURSE PRACTITIONER

## 2023-08-03 PROCEDURE — 1159F PR MEDICATION LIST DOCUMENTED IN MEDICAL RECORD: ICD-10-PCS | Mod: CPTII,S$GLB,, | Performed by: NURSE PRACTITIONER

## 2023-08-03 PROCEDURE — 3044F PR MOST RECENT HEMOGLOBIN A1C LEVEL <7.0%: ICD-10-PCS | Mod: CPTII,S$GLB,, | Performed by: NURSE PRACTITIONER

## 2023-08-03 PROCEDURE — 1160F PR REVIEW ALL MEDS BY PRESCRIBER/CLIN PHARMACIST DOCUMENTED: ICD-10-PCS | Mod: CPTII,S$GLB,, | Performed by: NURSE PRACTITIONER

## 2023-08-03 PROCEDURE — 99999 PR PBB SHADOW E&M-EST. PATIENT-LVL V: ICD-10-PCS | Mod: PBBFAC,,, | Performed by: NURSE PRACTITIONER

## 2023-08-03 PROCEDURE — 3078F PR MOST RECENT DIASTOLIC BLOOD PRESSURE < 80 MM HG: ICD-10-PCS | Mod: CPTII,S$GLB,, | Performed by: NURSE PRACTITIONER

## 2023-08-03 PROCEDURE — 3044F HG A1C LEVEL LT 7.0%: CPT | Mod: CPTII,S$GLB,, | Performed by: NURSE PRACTITIONER

## 2023-08-03 RX ORDER — AMOXICILLIN 875 MG/1
875 TABLET, FILM COATED ORAL 2 TIMES DAILY
Qty: 20 TABLET | Refills: 0 | Status: SHIPPED | OUTPATIENT
Start: 2023-08-03 | End: 2023-08-13

## 2023-08-03 RX ORDER — DEXBROMPHENIRAMINE MALEATE, PHENYLEPHRINE HYDROCHLORIDE 2; 7.5 MG/1; MG/1
1 TABLET ORAL EVERY 6 HOURS PRN
Qty: 30 TABLET | Refills: 2 | Status: SHIPPED | OUTPATIENT
Start: 2023-08-03 | End: 2023-09-20

## 2023-08-03 RX ORDER — METHYLPREDNISOLONE 4 MG/1
TABLET ORAL
Qty: 1 EACH | Refills: 0 | Status: SHIPPED | OUTPATIENT
Start: 2023-08-03 | End: 2023-09-20

## 2023-08-03 RX ORDER — DEXAMETHASONE SODIUM PHOSPHATE 100 MG/10ML
10 INJECTION INTRAMUSCULAR; INTRAVENOUS
Status: COMPLETED | OUTPATIENT
Start: 2023-08-03 | End: 2023-08-03

## 2023-08-03 RX ADMIN — DEXAMETHASONE SODIUM PHOSPHATE 10 MG: 100 INJECTION INTRAMUSCULAR; INTRAVENOUS at 11:08

## 2023-08-03 NOTE — PROGRESS NOTES
Shasta Rivera  08/03/2023  [id number]    Mary Redmond MD  Patient Care Team:  Mary Redmond MD as PCP - General (Family Medicine)  Kolton Alvarez MD as Consulting Physician (Pain Medicine)  Nesha Sanchez LPN as Care Coordinator (Internal Medicine)          Visit Type:  an urgent visit for a new problem  Chief Complaint:  Chief Complaint   Patient presents with    Sore Throat       History of Present Illness:    44 yo female presenst today with co throat pain hoarseness  that started yesterday.  Positive covid 4 weeks ago   Reports taking xyzal QD wit no symptom relief  Reports she is currently being evaluated by an ENT     History:  Past Medical History:   Diagnosis Date    Abnormal cervical Pap smear with positive HPV DNA test     Allergic rhinitis     Anemia     Herpes simplex type 2 infection     Onychomycosis     SAMIRA on CPAP     DOES NOT USE    Raynaud's phenomenon     Uterine fibroid      Past Surgical History:   Procedure Laterality Date    DILATION AND CURETTAGE OF UTERUS      for EAB    LUMBAR EPIDURAL INJECTION      MYOMECTOMY N/A 10/11/2021    Procedure: MYOMECTOMY;  Surgeon: Carol Ann Casarez MD;  Location: [facility];  Service: OB/GYN;  Laterality: N/A;     Family History   Problem Relation Age of Onset    Diabetes Mother     Hypertension Mother     Hyperlipidemia Mother     Colon cancer Father     Hyperlipidemia Father     Prostate cancer Father     Cancer Maternal Grandmother     Colon cancer Maternal Grandmother     Stroke Maternal Grandfather     Breast cancer Paternal Grandmother     Diabetes Paternal Grandmother     Hypertension Paternal Grandmother     Uterine cancer Paternal Grandmother     Sleep apnea Brother     Breast cancer Other      Social History     Socioeconomic History    Marital status:    Occupational History     Employer: [employer]   Tobacco Use    Smoking status: Never    Smokeless tobacco: Never   Substance and Sexual Activity    Alcohol use: Yes     Alcohol/week:  7.0 standard drinks of alcohol     Types: 7 Glasses of wine per week    Drug use: No    Sexual activity: Yes     Partners: Male   Social History Narrative    The patient is  who lives alone and works as a .  She has a Masters in Civil Engineering.     Patient Active Problem List   Diagnosis    Allergic rhinitis    Uterine fibroid    Primary insomnia    SAMIRA (obstructive sleep apnea)    MAYE (generalized anxiety disorder)    Menorrhagia with regular cycle    S/P myomectomy    Herpes simplex type 2 infection     Review of patient's allergies indicates:  No Known Allergies    The following were reviewed at this visit: active problem list, medication list, allergies, family history, social history, and health maintenance.    Medications:  Current Outpatient Medications on File Prior to Visit   Medication Sig Dispense Refill    ALPRAZolam (XANAX) 0.25 MG tablet TAKE 1-2 TABLETS BY MOUTH 3 TIMES DAILY AS NEEDED FOR ANXIETY. 30 tablet 3    buPROPion (WELLBUTRIN) 100 MG tablet Take 1 tablet (100 mg total) by mouth once daily. 30 tablet 5    clobetasoL (CLOBEX) 0.05 % shampoo Apply topically.      docusate sodium (COLACE) 100 MG capsule Take 1 capsule (100 mg total) by mouth 2 (two) times daily. 30 capsule 0    fluticasone propionate (FLONASE) 50 mcg/actuation nasal spray 2 sprays (100 mcg total) by Each Nostril route once daily at 6am. 16 g 9    HYDROcodone-acetaminophen (NORCO)  mg per tablet Take 1 tablet by mouth 4 (four) times daily as needed.      ibuprofen (ADVIL,MOTRIN) 800 MG tablet Take 1 tablet (800 mg total) by mouth every 8 (eight) hours. 30 tablet 0    levocetirizine (XYZAL) 5 MG tablet Take 1 tablet (5 mg total) by mouth every evening. 90 tablet 1    multivitamin capsule Take 1 capsule by mouth once daily.      RETIN-A MICRO PUMP 0.06 % GlwP Apply 1 application topically nightly.      valACYclovir (VALTREX) 500 MG tablet Take 1 tablet (500 mg total) by mouth once daily. 90 tablet 3     zolpidem (AMBIEN) 5 MG Tab TAKE 1 TABLET BY MOUTH EVERY DAY AT NIGHT AS NEEDED 30 tablet 5    [DISCONTINUED] 12 HOUR DECONGESTANT 120 mg 12 hr tablet TAKE 1 TABLET BY MOUTH 2 TIMES DAILY. 60 tablet 5    methylPREDNISolone (MEDROL DOSEPACK) 4 mg tablet use as directed (Patient not taking: Reported on 8/3/2023) 1 each 0    polyethylene glycol (GLYCOLAX) 17 gram PwPk Take 17 g by mouth daily as needed.  0     No current facility-administered medications on file prior to visit.       Medications have been reviewed and reconciled with patient at this visit.  Barriers to medications reviewed with patient.    Adverse reactions to current medications reviewed with patient..    Over the counter medications reviewed and reconciled with patient.    Exam:  Wt Readings from Last 3 Encounters:   08/03/23 70.4 kg (155 lb 3.3 oz)   05/31/23 72.2 kg (159 lb 2.8 oz)   05/23/23 70.8 kg (156 lb 3.1 oz)     Temp Readings from Last 3 Encounters:   08/03/23 99.1 °F (37.3 °C) (Oral)   05/23/23 98.7 °F (37.1 °C)   05/22/23 98.7 °F (37.1 °C)     BP Readings from Last 3 Encounters:   08/03/23 116/78   05/31/23 120/72   05/23/23 138/72     Pulse Readings from Last 3 Encounters:   08/03/23 95   05/31/23 92   05/23/23 79     Body mass index is 30.31 kg/m².      Review of Systems   Constitutional:  Negative for chills, fever and malaise/fatigue.   HENT:  Positive for congestion, sinus pain and sore throat. Negative for ear pain.         Hoarse   Respiratory:  Negative for cough, sputum production, shortness of breath and wheezing.    Neurological:  Negative for headaches.     Physical Exam  Vitals and nursing note reviewed.   Constitutional:       Appearance: Normal appearance. She is normal weight.   HENT:      Head: Normocephalic and atraumatic.      Right Ear: Tympanic membrane, ear canal and external ear normal.      Left Ear: Tympanic membrane, ear canal and external ear normal.      Nose: Nose normal.      Mouth/Throat:      Mouth: Mucous  membranes are moist.      Pharynx: Oropharynx is clear. Posterior oropharyngeal erythema present.   Eyes:      Extraocular Movements: Extraocular movements intact.      Conjunctiva/sclera: Conjunctivae normal.      Pupils: Pupils are equal, round, and reactive to light.   Cardiovascular:      Rate and Rhythm: Normal rate and regular rhythm.      Pulses: Normal pulses.      Heart sounds: Normal heart sounds.   Pulmonary:      Effort: Pulmonary effort is normal.      Breath sounds: Normal breath sounds.   Abdominal:      General: Bowel sounds are normal.      Palpations: Abdomen is soft.   Musculoskeletal:         General: Normal range of motion.      Cervical back: Normal range of motion and neck supple.   Skin:     General: Skin is warm and dry.      Capillary Refill: Capillary refill takes less than 2 seconds.   Neurological:      General: No focal deficit present.      Mental Status: She is alert and oriented to person, place, and time.   Psychiatric:         Mood and Affect: Mood normal.         Behavior: Behavior normal.         Thought Content: Thought content normal.         Judgment: Judgment normal.         Laboratory Reviewed ({Yes)  Lab Results   Component Value Date    WBC 10.65 05/23/2023    HGB 13.1 05/23/2023    HCT 40.5 05/23/2023     05/23/2023    CHOL 201 (H) 05/23/2023    TRIG 32 05/23/2023    HDL 77 (H) 05/23/2023    ALT 19 05/23/2023    AST 22 05/23/2023     05/23/2023    K 4.6 05/23/2023     05/23/2023    CREATININE 0.9 05/23/2023    BUN 16 05/23/2023    CO2 23 05/23/2023    TSH 0.174 (L) 05/23/2023    HGBA1C 5.1 05/23/2023       Shasta was seen today for sore throat.    Diagnoses and all orders for this visit:    Throat pain  -     Group A Strep, Molecular  -     amoxicillin (AMOXIL) 875 MG tablet; Take 1 tablet (875 mg total) by mouth 2 (two) times daily. for 10 days    Sinus congestion  -     methylPREDNISolone (MEDROL DOSEPACK) 4 mg tablet; TAKE AS DIRECTED  -      dexbrompheniramine-phenyleph (ALAHIST PE) 2-7.5 mg Tab; Take 1 tablet by mouth every 6 (six) hours as needed (sinus congestion).    Cough, unspecified type    Hoarseness of voice    Other orders  -     dexAMETHasone injection 10 mg          Care Plan/Goals: Reviewed    Goals    None       Shasta was seen today for sore throat.    Diagnoses and all orders for this visit:    Throat pain  -     Group A Strep, Molecular  -     amoxicillin (AMOXIL) 875 MG tablet; Take 1 tablet (875 mg total) by mouth 2 (two) times daily. for 10 days    Sinus congestion  -     methylPREDNISolone (MEDROL DOSEPACK) 4 mg tablet; TAKE AS DIRECTED  -     dexbrompheniramine-phenyleph (ALAHIST PE) 2-7.5 mg Tab; Take 1 tablet by mouth every 6 (six) hours as needed (sinus congestion).    Cough, unspecified type    Hoarseness of voice    Other orders  -     dexAMETHasone injection 10 mg       Follow up: Follow up if symptoms worsen or fail to improve.    After visit summary was printed and given to patient upon discharge today.  Patient goals and care plan are included in After Visit Summary.

## 2023-08-04 ENCOUNTER — NURSE TRIAGE (OUTPATIENT)
Dept: ADMINISTRATIVE | Facility: CLINIC | Age: 43
End: 2023-08-04
Payer: COMMERCIAL

## 2023-08-04 ENCOUNTER — PATIENT MESSAGE (OUTPATIENT)
Dept: OTOLARYNGOLOGY | Facility: CLINIC | Age: 43
End: 2023-08-04
Payer: COMMERCIAL

## 2023-08-04 ENCOUNTER — TELEPHONE (OUTPATIENT)
Dept: FAMILY MEDICINE | Facility: CLINIC | Age: 43
End: 2023-08-04
Payer: COMMERCIAL

## 2023-08-04 NOTE — TELEPHONE ENCOUNTER
Pt states she took 2 days worth of the medrol dosepack this morning at 6am. Took 11 tablets. Please advise. Wants to know how to take the remainder of the pack.

## 2023-08-04 NOTE — TELEPHONE ENCOUNTER
[place]    PCP:  Dr. Mary Redmond    She reports prescribed 2 prescriptions yesterday and she took too much of both.  She took more than a double dose of Medrol Dosepack and a double dose of Amoxicillin.  She just took the meds therefore not having any symptoms at this time.  Denies abdominal pain, vomiting, weakness, and difficulty breathing.  Per protocol, care advised is call Poison Center now.  Pt VU.  Advised to call for worsening/questions/concerns.  VU.     Reason for Disposition   MORE THAN A DOUBLE DOSE of a prescription or over-the-counter (OTC) drug    Additional Information   Negative: SEVERE difficulty breathing (e.g., struggling for each breath, speaks in single words)   Negative: Bluish (or gray) lips or face   Negative: Seizure   Negative: Difficult to awaken or acting confused (e.g., disoriented, slurred speech)   Negative: Shock suspected (e.g., cold/pale/clammy skin, too weak to stand, low BP, rapid pulse)   Negative: Intentional overdose and suicidal thoughts or ideas   Negative: Suicide attempt, known or suspected   Negative: Sounds like a life-threatening emergency to the triager   Negative: HARMFUL SUBSTANCE or ACID or ALKALI ingestion (e.g., toilet , drain , lye, Clinitest tablets, ammonia, bleaches) AND any symptoms (e.g., mouth pain, sore throat, breathing difficulty)   Negative: PETROLEUM PRODUCT ingestion (e.g., kerosene, gasoline, benzene, furniture polish, lighter fluid) AND any symptoms (e.g., breathing difficulty, coughing, vomiting)   Negative: Poison Center advised caller to go to ED and caller seeking second opinion   Negative: Patient sounds very sick or weak to the triager   Negative: HARMFUL SUBSTANCE or ACID or ALKALI ingestion (e.g., toilet , drain , lye, Clinitest tablets, ammonia, bleaches) AND NO symptoms   Negative: PETROLEUM PRODUCT ingestion (e.g., kerosene, gasoline, benzene, furniture polish, lighter fluid) AND NO symptoms   Negative:  Carbon monoxide exposure suspected    Protocols used: Poisoning-[facility]-[place]

## 2023-08-06 NOTE — TELEPHONE ENCOUNTER
No care due was identified.  [facility] Embedded Care Due Messages. Reference number: [id number].   8/06/2023 1:17:24 AM CDT

## 2023-08-07 RX ORDER — ZOLPIDEM TARTRATE 5 MG/1
5 TABLET ORAL NIGHTLY PRN
Qty: 30 TABLET | Refills: 0 | Status: SHIPPED | OUTPATIENT
Start: 2023-08-07 | End: 2023-09-20 | Stop reason: SDUPTHER

## 2023-08-08 NOTE — TELEPHONE ENCOUNTER
No care due was identified.  [facility] Embedded Care Due Messages. Reference number: [id number].   8/08/2023 10:18:36 AM CDT

## 2023-08-09 RX ORDER — FLUTICASONE PROPIONATE 50 MCG
SPRAY, SUSPENSION (ML) NASAL
Qty: 16 ML | Refills: 3 | Status: SHIPPED | OUTPATIENT
Start: 2023-08-09

## 2023-08-09 NOTE — TELEPHONE ENCOUNTER
Refill Decision Note   Shasta Rivera  is requesting a refill authorization.  Brief Assessment and Rationale for Refill:  Approve     Medication Therapy Plan:         Comments:     Note composed:7:09 AM 08/09/2023

## 2023-08-10 RX ORDER — ALPRAZOLAM 0.25 MG/1
TABLET ORAL
Qty: 30 TABLET | Refills: 3 | Status: SHIPPED | OUTPATIENT
Start: 2023-08-10

## 2023-08-10 NOTE — TELEPHONE ENCOUNTER
No care due was identified.  [facility] Embedded Care Due Messages. Reference number: [id number].   8/09/2023 11:07:55 PM CDT

## 2023-08-26 ENCOUNTER — PATIENT MESSAGE (OUTPATIENT)
Dept: SLEEP MEDICINE | Facility: CLINIC | Age: 43
End: 2023-08-26
Payer: COMMERCIAL

## 2023-09-13 ENCOUNTER — PATIENT MESSAGE (OUTPATIENT)
Dept: FAMILY MEDICINE | Facility: CLINIC | Age: 43
End: 2023-09-13
Payer: COMMERCIAL

## 2023-09-13 ENCOUNTER — PATIENT MESSAGE (OUTPATIENT)
Dept: SLEEP MEDICINE | Facility: CLINIC | Age: 43
End: 2023-09-13
Payer: COMMERCIAL

## 2023-09-13 ENCOUNTER — TELEPHONE (OUTPATIENT)
Dept: FAMILY MEDICINE | Facility: CLINIC | Age: 43
End: 2023-09-13
Payer: COMMERCIAL

## 2023-09-13 DIAGNOSIS — Z12.31 ENCOUNTER FOR SCREENING MAMMOGRAM FOR MALIGNANT NEOPLASM OF BREAST: Primary | ICD-10-CM

## 2023-09-18 ENCOUNTER — PATIENT MESSAGE (OUTPATIENT)
Dept: FAMILY MEDICINE | Facility: CLINIC | Age: 43
End: 2023-09-18
Payer: COMMERCIAL

## 2023-09-19 ENCOUNTER — PATIENT MESSAGE (OUTPATIENT)
Dept: FAMILY MEDICINE | Facility: CLINIC | Age: 43
End: 2023-09-19
Payer: COMMERCIAL

## 2023-09-20 RX ORDER — ZOLPIDEM TARTRATE 5 MG/1
5 TABLET ORAL NIGHTLY PRN
Qty: 30 TABLET | Refills: 5 | Status: SHIPPED | OUTPATIENT
Start: 2023-09-20 | End: 2024-03-25

## 2023-09-20 RX ORDER — PSEUDOEPHEDRINE HCL 120 MG/1
120 TABLET, FILM COATED, EXTENDED RELEASE ORAL 2 TIMES DAILY
Qty: 60 TABLET | Refills: 0 | Status: SHIPPED | OUTPATIENT
Start: 2023-09-20 | End: 2023-10-26

## 2023-10-19 ENCOUNTER — HOSPITAL ENCOUNTER (OUTPATIENT)
Dept: RADIOLOGY | Facility: HOSPITAL | Age: 43
Discharge: HOME | End: 2023-10-19
Attending: FAMILY MEDICINE
Payer: COMMERCIAL

## 2023-10-19 VITALS — BODY MASS INDEX: 30.47 KG/M2 | WEIGHT: 155.19 LBS | HEIGHT: 60 IN

## 2023-10-19 DIAGNOSIS — Z12.31 ENCOUNTER FOR SCREENING MAMMOGRAM FOR MALIGNANT NEOPLASM OF BREAST: ICD-10-CM

## 2023-10-19 PROCEDURE — 77067 MAMMO DIGITAL SCREENING BILAT WITH TOMO: ICD-10-PCS | Mod: 26,,, | Performed by: RADIOLOGY

## 2023-10-19 PROCEDURE — 77067 SCR MAMMO BI INCL CAD: CPT | Mod: 26,,, | Performed by: RADIOLOGY

## 2023-10-19 PROCEDURE — 77063 BREAST TOMOSYNTHESIS BI: CPT | Mod: 26,,, | Performed by: RADIOLOGY

## 2023-10-19 PROCEDURE — 77063 MAMMO DIGITAL SCREENING BILAT WITH TOMO: ICD-10-PCS | Mod: 26,,, | Performed by: RADIOLOGY

## 2023-10-19 PROCEDURE — 77067 SCR MAMMO BI INCL CAD: CPT | Mod: TC

## 2023-10-20 ENCOUNTER — PATIENT MESSAGE (OUTPATIENT)
Dept: FAMILY MEDICINE | Facility: CLINIC | Age: 43
End: 2023-10-20
Payer: COMMERCIAL

## 2023-10-20 DIAGNOSIS — Z91.89 AT HIGH RISK FOR BREAST CANCER: Primary | ICD-10-CM

## 2023-10-26 RX ORDER — PSEUDOEPHEDRINE HCL 120 MG
120 TABLET, EXTENDED RELEASE ORAL 2 TIMES DAILY
Qty: 60 TABLET | Refills: 5 | Status: SHIPPED | OUTPATIENT
Start: 2023-10-26

## 2023-10-26 NOTE — TELEPHONE ENCOUNTER
No care due was identified.  [facility] Embedded Care Due Messages. Reference number: [id number].   10/26/2023 5:55:22 AM CDT

## 2023-10-30 ENCOUNTER — OFFICE VISIT (OUTPATIENT)
Dept: SURGERY | Facility: CLINIC | Age: 43
End: 2023-10-30
Payer: COMMERCIAL

## 2023-10-30 VITALS
BODY MASS INDEX: 25.97 KG/M2 | TEMPERATURE: 97 F | WEIGHT: 161.63 LBS | HEART RATE: 86 BPM | DIASTOLIC BLOOD PRESSURE: 81 MMHG | HEIGHT: 66 IN | SYSTOLIC BLOOD PRESSURE: 129 MMHG

## 2023-10-30 DIAGNOSIS — Z91.89 AT HIGH RISK FOR BREAST CANCER: ICD-10-CM

## 2023-10-30 PROCEDURE — 1160F RVW MEDS BY RX/DR IN RCRD: CPT | Mod: CPTII,S$GLB,, | Performed by: SURGERY

## 2023-10-30 PROCEDURE — 3079F DIAST BP 80-89 MM HG: CPT | Mod: CPTII,S$GLB,, | Performed by: SURGERY

## 2023-10-30 PROCEDURE — 3044F PR MOST RECENT HEMOGLOBIN A1C LEVEL <7.0%: ICD-10-PCS | Mod: CPTII,S$GLB,, | Performed by: SURGERY

## 2023-10-30 PROCEDURE — 1160F PR REVIEW ALL MEDS BY PRESCRIBER/CLIN PHARMACIST DOCUMENTED: ICD-10-PCS | Mod: CPTII,S$GLB,, | Performed by: SURGERY

## 2023-10-30 PROCEDURE — 99999 PR PBB SHADOW E&M-EST. PATIENT-LVL V: ICD-10-PCS | Mod: PBBFAC,,, | Performed by: SURGERY

## 2023-10-30 PROCEDURE — 3074F PR MOST RECENT SYSTOLIC BLOOD PRESSURE < 130 MM HG: ICD-10-PCS | Mod: CPTII,S$GLB,, | Performed by: SURGERY

## 2023-10-30 PROCEDURE — 3044F HG A1C LEVEL LT 7.0%: CPT | Mod: CPTII,S$GLB,, | Performed by: SURGERY

## 2023-10-30 PROCEDURE — 1159F MED LIST DOCD IN RCRD: CPT | Mod: CPTII,S$GLB,, | Performed by: SURGERY

## 2023-10-30 PROCEDURE — 99999 PR PBB SHADOW E&M-EST. PATIENT-LVL V: CPT | Mod: PBBFAC,,, | Performed by: SURGERY

## 2023-10-30 PROCEDURE — 3008F PR BODY MASS INDEX (BMI) DOCUMENTED: ICD-10-PCS | Mod: CPTII,S$GLB,, | Performed by: SURGERY

## 2023-10-30 PROCEDURE — 99204 OFFICE O/P NEW MOD 45 MIN: CPT | Mod: S$GLB,,, | Performed by: SURGERY

## 2023-10-30 PROCEDURE — 99204 PR OFFICE/OUTPT VISIT, NEW, LEVL IV, 45-59 MIN: ICD-10-PCS | Mod: S$GLB,,, | Performed by: SURGERY

## 2023-10-30 PROCEDURE — 3008F BODY MASS INDEX DOCD: CPT | Mod: CPTII,S$GLB,, | Performed by: SURGERY

## 2023-10-30 PROCEDURE — 1159F PR MEDICATION LIST DOCUMENTED IN MEDICAL RECORD: ICD-10-PCS | Mod: CPTII,S$GLB,, | Performed by: SURGERY

## 2023-10-30 PROCEDURE — 3079F PR MOST RECENT DIASTOLIC BLOOD PRESSURE 80-89 MM HG: ICD-10-PCS | Mod: CPTII,S$GLB,, | Performed by: SURGERY

## 2023-10-30 PROCEDURE — 3074F SYST BP LT 130 MM HG: CPT | Mod: CPTII,S$GLB,, | Performed by: SURGERY

## 2023-10-31 ENCOUNTER — OFFICE VISIT (OUTPATIENT)
Dept: SLEEP MEDICINE | Facility: CLINIC | Age: 43
End: 2023-10-31
Payer: COMMERCIAL

## 2023-10-31 VITALS
SYSTOLIC BLOOD PRESSURE: 126 MMHG | OXYGEN SATURATION: 93 % | DIASTOLIC BLOOD PRESSURE: 82 MMHG | BODY MASS INDEX: 26.43 KG/M2 | HEART RATE: 86 BPM | RESPIRATION RATE: 19 BRPM | WEIGHT: 164.44 LBS | HEIGHT: 66 IN

## 2023-10-31 DIAGNOSIS — F51.01 PRIMARY INSOMNIA: ICD-10-CM

## 2023-10-31 DIAGNOSIS — G47.33 OSA (OBSTRUCTIVE SLEEP APNEA): ICD-10-CM

## 2023-10-31 DIAGNOSIS — J30.89 NON-SEASONAL ALLERGIC RHINITIS, UNSPECIFIED TRIGGER: Primary | ICD-10-CM

## 2023-10-31 PROCEDURE — 3008F BODY MASS INDEX DOCD: CPT | Mod: CPTII,S$GLB,, | Performed by: NURSE PRACTITIONER

## 2023-10-31 PROCEDURE — 99213 OFFICE O/P EST LOW 20 MIN: CPT | Mod: S$GLB,,, | Performed by: NURSE PRACTITIONER

## 2023-10-31 PROCEDURE — 3079F PR MOST RECENT DIASTOLIC BLOOD PRESSURE 80-89 MM HG: ICD-10-PCS | Mod: CPTII,S$GLB,, | Performed by: NURSE PRACTITIONER

## 2023-10-31 PROCEDURE — 1159F MED LIST DOCD IN RCRD: CPT | Mod: CPTII,S$GLB,, | Performed by: NURSE PRACTITIONER

## 2023-10-31 PROCEDURE — 3074F SYST BP LT 130 MM HG: CPT | Mod: CPTII,S$GLB,, | Performed by: NURSE PRACTITIONER

## 2023-10-31 PROCEDURE — 3044F HG A1C LEVEL LT 7.0%: CPT | Mod: CPTII,S$GLB,, | Performed by: NURSE PRACTITIONER

## 2023-10-31 PROCEDURE — 3008F PR BODY MASS INDEX (BMI) DOCUMENTED: ICD-10-PCS | Mod: CPTII,S$GLB,, | Performed by: NURSE PRACTITIONER

## 2023-10-31 PROCEDURE — 99213 PR OFFICE/OUTPT VISIT, EST, LEVL III, 20-29 MIN: ICD-10-PCS | Mod: S$GLB,,, | Performed by: NURSE PRACTITIONER

## 2023-10-31 PROCEDURE — 99999 PR PBB SHADOW E&M-EST. PATIENT-LVL V: CPT | Mod: PBBFAC,,, | Performed by: NURSE PRACTITIONER

## 2023-10-31 PROCEDURE — 3079F DIAST BP 80-89 MM HG: CPT | Mod: CPTII,S$GLB,, | Performed by: NURSE PRACTITIONER

## 2023-10-31 PROCEDURE — 1160F RVW MEDS BY RX/DR IN RCRD: CPT | Mod: CPTII,S$GLB,, | Performed by: NURSE PRACTITIONER

## 2023-10-31 PROCEDURE — 3044F PR MOST RECENT HEMOGLOBIN A1C LEVEL <7.0%: ICD-10-PCS | Mod: CPTII,S$GLB,, | Performed by: NURSE PRACTITIONER

## 2023-10-31 PROCEDURE — 99999 PR PBB SHADOW E&M-EST. PATIENT-LVL V: ICD-10-PCS | Mod: PBBFAC,,, | Performed by: NURSE PRACTITIONER

## 2023-10-31 PROCEDURE — 1160F PR REVIEW ALL MEDS BY PRESCRIBER/CLIN PHARMACIST DOCUMENTED: ICD-10-PCS | Mod: CPTII,S$GLB,, | Performed by: NURSE PRACTITIONER

## 2023-10-31 PROCEDURE — 1159F PR MEDICATION LIST DOCUMENTED IN MEDICAL RECORD: ICD-10-PCS | Mod: CPTII,S$GLB,, | Performed by: NURSE PRACTITIONER

## 2023-10-31 PROCEDURE — 3074F PR MOST RECENT SYSTOLIC BLOOD PRESSURE < 130 MM HG: ICD-10-PCS | Mod: CPTII,S$GLB,, | Performed by: NURSE PRACTITIONER

## 2023-10-31 NOTE — PROGRESS NOTES
History & Physical    SUBJECTIVE:     History of Present Illness:  Patient is a 43 y.o. female referred for high risk breast cancer screening. She was recently noted to have elevated tyrer cuzik score on recent mammogram. Denies any breast mass, pain, nipple discharge, or other changes. No prior breast surgery or abnormal mammograms. Family history of breast cancer in maternal and paternal grandmothers. Menarche age 10, , eggs frozen and considering pregnancy in near future.    No chief complaint on file.      Review of patient's allergies indicates:  No Known Allergies    Current Outpatient Medications   Medication Sig Dispense Refill    12 HOUR DECONGESTANT 120 mg 12 hr tablet TAKE 1 TABLET BY MOUTH 2 TIMES DAILY. 60 tablet 5    ALPRAZolam (XANAX) 0.25 MG tablet TAKE 1-2 TABLETS BY MOUTH 3 TIMES DAILY AS NEEDED FOR ANXIETY. 30 tablet 3    buPROPion (WELLBUTRIN) 100 MG tablet Take 1 tablet (100 mg total) by mouth once daily. 30 tablet 5    clobetasoL (CLOBEX) 0.05 % shampoo Apply topically.      docusate sodium (COLACE) 100 MG capsule Take 1 capsule (100 mg total) by mouth 2 (two) times daily. 30 capsule 0    fluticasone propionate (FLONASE) 50 mcg/actuation nasal spray USE 2 SPRAYS IN EACH NOSTRIL DAILY AT 6 AM 16 mL 3    HYDROcodone-acetaminophen (NORCO)  mg per tablet Take 1 tablet by mouth 4 (four) times daily as needed.      ibuprofen (ADVIL,MOTRIN) 800 MG tablet Take 1 tablet (800 mg total) by mouth every 8 (eight) hours. 30 tablet 0    multivitamin capsule Take 1 capsule by mouth once daily.      polyethylene glycol (GLYCOLAX) 17 gram PwPk Take 17 g by mouth daily as needed.  0    RETIN-A MICRO PUMP 0.06 % GlwP Apply 1 application topically nightly.      valACYclovir (VALTREX) 500 MG tablet Take 1 tablet (500 mg total) by mouth once daily. 90 tablet 3    zolpidem (AMBIEN) 5 MG Tab Take 1 tablet (5 mg total) by mouth nightly as needed (sleep). 30 tablet 5    levocetirizine (XYZAL) 5 MG tablet Take 1  tablet (5 mg total) by mouth every evening. 90 tablet 1     No current facility-administered medications for this visit.       Past Medical History:   Diagnosis Date    Abnormal cervical Pap smear with positive HPV DNA test     Allergic rhinitis     Anemia     Herpes simplex type 2 infection     Onychomycosis     SAMIRA on CPAP     DOES NOT USE    Raynaud's phenomenon     Uterine fibroid      Past Surgical History:   Procedure Laterality Date    DILATION AND CURETTAGE OF UTERUS      for EAB    LUMBAR EPIDURAL INJECTION      MYOMECTOMY N/A 10/11/2021    Procedure: MYOMECTOMY;  Surgeon: Carol Ann Casarez MD;  Location: [facility];  Service: OB/GYN;  Laterality: N/A;     Family History   Problem Relation Age of Onset    Diabetes Mother     Hypertension Mother     Hyperlipidemia Mother     Colon cancer Father     Hyperlipidemia Father     Prostate cancer Father     Breast cancer Maternal Grandmother     Cancer Maternal Grandmother     Colon cancer Maternal Grandmother     Stroke Maternal Grandfather     Breast cancer Paternal Grandmother     Diabetes Paternal Grandmother     Hypertension Paternal Grandmother     Uterine cancer Paternal Grandmother     Sleep apnea Brother     Breast cancer Other      Social History     Tobacco Use    Smoking status: Never    Smokeless tobacco: Never   Substance Use Topics    Alcohol use: Yes     Alcohol/week: 7.0 standard drinks of alcohol     Types: 7 Glasses of wine per week    Drug use: No        Review of Systems:  Review of Systems   Constitutional:  Negative for chills, fatigue, fever and unexpected weight change.   Respiratory:  Negative for cough, shortness of breath, wheezing and stridor.    Cardiovascular:  Negative for chest pain, palpitations and leg swelling.   Gastrointestinal:  Negative for abdominal distention, abdominal pain, constipation, diarrhea, nausea and vomiting.   Genitourinary:  Negative for difficulty urinating, dysuria, frequency, hematuria and urgency.   Skin:   "Negative for color change, pallor, rash and wound.   Hematological:  Does not bruise/bleed easily.       OBJECTIVE:     Vital Signs (Most Recent)  Temp: 97.2 °F (36.2 °C) (10/30/23 0937)  Pulse: 86 (10/30/23 0937)  BP: 129/81 (10/30/23 0937)  5' 6" (1.676 m)  73.3 kg (161 lb 9.6 oz)     Physical Exam:  Physical Exam  Vitals reviewed.   Constitutional:       General: She is not in acute distress.     Appearance: She is well-developed. She is not diaphoretic.   HENT:      Head: Normocephalic and atraumatic.      Right Ear: External ear normal.      Left Ear: External ear normal.   Eyes:      General: No scleral icterus.     Conjunctiva/sclera: Conjunctivae normal.      Pupils: Pupils are equal, round, and reactive to light.   Neck:      Thyroid: No thyromegaly.      Trachea: No tracheal deviation.   Cardiovascular:      Rate and Rhythm: Normal rate and regular rhythm.      Heart sounds: Normal heart sounds. No murmur heard.     No friction rub. No gallop.   Pulmonary:      Effort: Pulmonary effort is normal. No respiratory distress.      Breath sounds: Normal breath sounds. No wheezing or rales.   Chest:      Chest wall: No tenderness.   Breasts:     Right: No inverted nipple, mass, nipple discharge, skin change or tenderness.      Left: No inverted nipple, mass, nipple discharge, skin change or tenderness.   Abdominal:      General: Bowel sounds are normal. There is no distension.      Palpations: Abdomen is soft.      Tenderness: There is no abdominal tenderness.      Hernia: No hernia is present.   Musculoskeletal:         General: No tenderness or deformity. Normal range of motion.      Cervical back: Normal range of motion and neck supple.   Lymphadenopathy:      Cervical: No cervical adenopathy.   Skin:     General: Skin is warm and dry.      Coloration: Skin is not pale.      Findings: No erythema or rash.   Neurological:      Mental Status: She is alert and oriented to person, place, and time.   Psychiatric:   "       Behavior: Behavior normal.         Thought Content: Thought content normal.         Judgment: Judgment normal.         Diagnostic Results:  Result:  Mammo Digital Screening Bilat w/ Nico     History:  Patient is 43 y.o. and is seen for a screening mammogram.        Films Compared:  Compared to: 09/28/2022 Mammo Digital Screening Bilat w/ Nico and 03/17/2021 Mammo Digital Screening Bilat w/ Nico      Findings:  This procedure was performed using tomosynthesis.   Computer-aided detection was utilized in the interpretation of this examination.     The breasts are heterogeneously dense, which may obscure small masses. There is no evidence of suspicious masses, microcalcifications or architectural distortion.     Impression:   No mammographic evidence of malignancy.     BI-RADS Category 1: Negative     Recommendation:  Routine screening mammogram in 1 year is recommended.     Your estimated lifetime risk of breast cancer (to age 85) based on Tyrer-Cuzick risk assessment model is 21.84 %.  According to the [employer], patients with a lifetime breast cancer risk of 20% or higher might benefit from supplemental screening tests, such as screening breast MRI    ASSESSMENT/PLAN:     42 y/o female at high risk for breast cancer    PLAN:Plan     1. Elevated risk for breast cancer calculated at the time of her recent mammogram- mammogram wnl  2. Negative clinical examination  3. Explained that her family history of breast cancer and OB history elevates her risk. Pt denies any knowledge of genetic testing.   4. Encourage 30 min of exercise daily  5. Discussed increase risk of breast cancer and recommendations for additional screening as a result- MRI of the breasts in 6 months with breast examination- risks may decrease in the future and she would fall out of the breast cancer high risk clinic as a result. Will plan to do MRI in 6 months and see her a week later for results and exam.   6. Proper BSE technique  explained and demonstrated- pt verbalized understanding- pt is to call if notes any changes. Contact information given     45 minutes of total time spent on the encounter, which includes face to face time and non-face to face time preparing to see the patient (eg, review of tests), Obtaining and/or reviewing separately obtained history, Documenting clinical information in the electronic or other health record, Independently interpreting results (not separately reported) and communicating results to the patient/family/caregiver, or Care coordination (not separately reported).

## 2023-10-31 NOTE — PROGRESS NOTES
"Subjective:      Patient ID: Shasta Rivera is a 43 y.o. female.    Chief Complaint: Sleep Apnea    HPI  Presents to office for review of AutoPAP therapy. Patient states improved symptoms with use of AutoPAP. Sleeping more soundly. Waking up feeling more refreshed. Improved daytime sleepiness. Patient states she is benefiting from use of the AutoPAP.   Sleeps 6 hours on average     Patient Active Problem List   Diagnosis    Allergic rhinitis    Uterine fibroid    Primary insomnia    SAMIRA (obstructive sleep apnea)    MAYE (generalized anxiety disorder)    Menorrhagia with regular cycle    S/P myomectomy    Herpes simplex type 2 infection     /82   Pulse 86   Resp 19   Ht 5' 6" (1.676 m)   Wt 74.6 kg (164 lb 7.4 oz)   LMP 10/23/2023   SpO2 (!) 93% Comment: has gel nails  BMI 26.55 kg/m²   Body mass index is 26.55 kg/m².    Review of Systems   Constitutional: Negative.    HENT: Negative.     Respiratory: Negative.     Cardiovascular: Negative.    Musculoskeletal: Negative.    Gastrointestinal: Negative.    Neurological: Negative.    Psychiatric/Behavioral: Negative.       Objective:      Physical Exam  Constitutional:       Appearance: Normal appearance. She is well-developed.   HENT:      Head: Normocephalic and atraumatic.      Nose: Nose normal.   Cardiovascular:      Rate and Rhythm: Normal rate and regular rhythm.      Heart sounds: No murmur heard.     No gallop.   Pulmonary:      Effort: Pulmonary effort is normal.      Breath sounds: Normal breath sounds.   Abdominal:      Palpations: Abdomen is soft.      Tenderness: There is no abdominal tenderness.   Musculoskeletal:         General: Normal range of motion.      Cervical back: Normal range of motion and neck supple.   Skin:     General: Skin is warm and dry.   Neurological:      Mental Status: She is alert and oriented to person, place, and time.   Psychiatric:         Mood and Affect: Mood normal.         Behavior: Behavior normal. "       Personal Diagnostic Review  Compliance Report  Initial compliance period 07/24/2023 - 08/22/2023  Compliance met Yes  Compliance percentage 83%  Payor Standard  Usage 07/24/2023 - 08/22/2023  Usage days 28/30 days (93%)  >= 4 hours 25 days (83%)  < 4 hours 3 days (10%)  Usage hours 165 hours 36 minutes  Average usage (total days) 5 hours 31 minutes  Average usage (days used) 5 hours 55 minutes  Median usage (days used) 6 hours 16 minutes  Total used hours (value since last reset - 08/22/2023) 164 hours  AirSense 10 AutoSet  Serial number [id number]  Mode AutoSet  Min Pressure 4 cmH2O  Max Pressure 16 cmH2O  EPR Fulltime  EPR level 3  Response Standard  Therapy  Pressure - cmH2O Median: 7.3 95th percentile: 11.8 Maximum: 12.9  Leaks - L/min Median: 2.0 95th percentile: 8.1 Maximum: 110.1  Events per hour AI: 0.6 HI: 0.5 AHI: 1.1  Apnea Index Central: 0.1 Obstructive: 0.2 Unknown: 0.3  RERA Index 0.3  Cheyne-Irizarry respiration (average duration per night) 0 minutes (0%)  Usage - hours  Printed on  Assessment:       1. Non-seasonal allergic rhinitis, unspecified trigger    2. SAMIRA (obstructive sleep apnea)    3. Primary insomnia        Outpatient Encounter Medications as of 10/31/2023   Medication Sig Dispense Refill    12 HOUR DECONGESTANT 120 mg 12 hr tablet TAKE 1 TABLET BY MOUTH 2 TIMES DAILY. 60 tablet 5    ALPRAZolam (XANAX) 0.25 MG tablet TAKE 1-2 TABLETS BY MOUTH 3 TIMES DAILY AS NEEDED FOR ANXIETY. 30 tablet 3    buPROPion (WELLBUTRIN) 100 MG tablet Take 1 tablet (100 mg total) by mouth once daily. 30 tablet 5    clobetasoL (CLOBEX) 0.05 % shampoo Apply topically.      docusate sodium (COLACE) 100 MG capsule Take 1 capsule (100 mg total) by mouth 2 (two) times daily. 30 capsule 0    fluticasone propionate (FLONASE) 50 mcg/actuation nasal spray USE 2 SPRAYS IN EACH NOSTRIL DAILY AT 6 AM 16 mL 3    HYDROcodone-acetaminophen (NORCO)  mg per tablet Take 1 tablet by mouth 4 (four) times daily as needed.       ibuprofen (ADVIL,MOTRIN) 800 MG tablet Take 1 tablet (800 mg total) by mouth every 8 (eight) hours. 30 tablet 0    multivitamin capsule Take 1 capsule by mouth once daily.      polyethylene glycol (GLYCOLAX) 17 gram PwPk Take 17 g by mouth daily as needed.  0    RETIN-A MICRO PUMP 0.06 % GlwP Apply 1 application topically nightly.      valACYclovir (VALTREX) 500 MG tablet Take 1 tablet (500 mg total) by mouth once daily. 90 tablet 3    zolpidem (AMBIEN) 5 MG Tab Take 1 tablet (5 mg total) by mouth nightly as needed (sleep). 30 tablet 5    levocetirizine (XYZAL) 5 MG tablet Take 1 tablet (5 mg total) by mouth every evening. 90 tablet 1     No facility-administered encounter medications on file as of 10/31/2023.     No orders of the defined types were placed in this encounter.    Plan:     Problem List Items Addressed This Visit          ENT    Allergic rhinitis - Primary       Other    Primary insomnia    SAMIRA (obstructive sleep apnea)    Overview     Autopap            Ambien  Schedule 7 hr sleep  Compliant with PAP and benefits from use. Follow up annually in the sleep clinic.               Elizabeth LeJeune, ACNP, ANP

## 2024-02-15 ENCOUNTER — OFFICE VISIT (OUTPATIENT)
Dept: FAMILY MEDICINE | Facility: CLINIC | Age: 44
End: 2024-02-15
Payer: COMMERCIAL

## 2024-02-15 VITALS
OXYGEN SATURATION: 99 % | HEIGHT: 67 IN | SYSTOLIC BLOOD PRESSURE: 128 MMHG | TEMPERATURE: 98 F | DIASTOLIC BLOOD PRESSURE: 86 MMHG | BODY MASS INDEX: 27.25 KG/M2 | HEART RATE: 93 BPM | WEIGHT: 173.63 LBS

## 2024-02-15 DIAGNOSIS — J01.91 ACUTE RECURRENT SINUSITIS, UNSPECIFIED LOCATION: Primary | ICD-10-CM

## 2024-02-15 DIAGNOSIS — R05.9 COUGH, UNSPECIFIED TYPE: ICD-10-CM

## 2024-02-15 LAB
CTP QC/QA: YES
MOLECULAR STREP A: NEGATIVE
POC MOLECULAR INFLUENZA A AGN: NEGATIVE
POC MOLECULAR INFLUENZA B AGN: NEGATIVE
SARS-COV-2 RDRP RESP QL NAA+PROBE: NEGATIVE

## 2024-02-15 PROCEDURE — 99999 PR PBB SHADOW E&M-EST. PATIENT-LVL IV: CPT | Mod: PBBFAC,,, | Performed by: STUDENT IN AN ORGANIZED HEALTH CARE EDUCATION/TRAINING PROGRAM

## 2024-02-15 PROCEDURE — 87635 SARS-COV-2 COVID-19 AMP PRB: CPT | Mod: QW,S$GLB,, | Performed by: STUDENT IN AN ORGANIZED HEALTH CARE EDUCATION/TRAINING PROGRAM

## 2024-02-15 PROCEDURE — 87502 INFLUENZA DNA AMP PROBE: CPT | Mod: QW,S$GLB,, | Performed by: STUDENT IN AN ORGANIZED HEALTH CARE EDUCATION/TRAINING PROGRAM

## 2024-02-15 PROCEDURE — 3008F BODY MASS INDEX DOCD: CPT | Mod: CPTII,S$GLB,, | Performed by: STUDENT IN AN ORGANIZED HEALTH CARE EDUCATION/TRAINING PROGRAM

## 2024-02-15 PROCEDURE — 3074F SYST BP LT 130 MM HG: CPT | Mod: CPTII,S$GLB,, | Performed by: STUDENT IN AN ORGANIZED HEALTH CARE EDUCATION/TRAINING PROGRAM

## 2024-02-15 PROCEDURE — 87651 STREP A DNA AMP PROBE: CPT | Mod: QW,S$GLB,, | Performed by: STUDENT IN AN ORGANIZED HEALTH CARE EDUCATION/TRAINING PROGRAM

## 2024-02-15 PROCEDURE — 3079F DIAST BP 80-89 MM HG: CPT | Mod: CPTII,S$GLB,, | Performed by: STUDENT IN AN ORGANIZED HEALTH CARE EDUCATION/TRAINING PROGRAM

## 2024-02-15 PROCEDURE — 1159F MED LIST DOCD IN RCRD: CPT | Mod: CPTII,S$GLB,, | Performed by: STUDENT IN AN ORGANIZED HEALTH CARE EDUCATION/TRAINING PROGRAM

## 2024-02-15 PROCEDURE — 99213 OFFICE O/P EST LOW 20 MIN: CPT | Mod: S$GLB,,, | Performed by: STUDENT IN AN ORGANIZED HEALTH CARE EDUCATION/TRAINING PROGRAM

## 2024-02-15 NOTE — PROGRESS NOTES
"Same day clinic    Shasta Rivera is a 43 y.o. year old Black or  female  has a past medical history of Abnormal cervical Pap smear with positive HPV DNA test, Allergic rhinitis, Anemia, Herpes simplex type 2 infection, Onychomycosis, SAMIRA on CPAP, Raynaud's phenomenon, and Uterine fibroid.. Pt presented to the clinic for sinus congestion. A/w headache and sore throat. She had low grade fever yesterday. POCT COVID, flu and strep is negative       ROS: Negative except above      Vitals:    02/15/24 1020   BP: 128/86   BP Location: Right arm   Patient Position: Sitting   BP Method: Large (Manual)   Pulse: 93   Temp: 97.7 °F (36.5 °C)   TempSrc: Tympanic   SpO2: 99%   Weight: 78.8 kg (173 lb 9.8 oz)   Height: 5' 7" (1.702 m)       Body mass index is 27.19 kg/m².     PE:  General - Well developed, alert and oriented in NAD  HEENT - normocephalic, no evidence of trauma, sclera white, EOMI, nasal congestion +  Neck - full range of motion  COR - regular rate and rhythm without murmurs or gallops  Lungs - Clear  Abdomen - soft, non-tender  Ext - no cyanosis or edema    Lab Results   Component Value Date    WBC 10.65 05/23/2023    HGB 13.1 05/23/2023    HCT 40.5 05/23/2023    MCV 93 05/23/2023    MCH 29.9 05/23/2023    MCHC 32.3 05/23/2023    RDW 13.9 05/23/2023     05/23/2023    MPV 11.8 05/23/2023       Lab Results   Component Value Date     05/23/2023    K 4.6 05/23/2023     05/23/2023    CO2 23 05/23/2023     (H) 05/23/2023    BUN 16 05/23/2023    CALCIUM 10.2 05/23/2023    PROT 8.7 (H) 05/23/2023    ALBUMIN 4.1 05/23/2023    BILITOT 0.2 05/23/2023    AST 22 05/23/2023    ALKPHOS 65 05/23/2023    ALT 19 05/23/2023       Lab Results   Component Value Date    TRIG 32 05/23/2023    CHOL 201 (H) 05/23/2023    HDL 77 (H) 05/23/2023    LDLCALC 117.6 05/23/2023    CHOLHDL 38.3 05/23/2023           Lab Results   Component Value Date    HGBA1C 5.1 05/23/2023       No results found " "for: "FITZ", "[id number]"      Impression:  1. Acute recurrent sinusitis, unspecified location        2. Cough, unspecified type  POCT COVID-19 Rapid Screening    POCT Influenza A/B Molecular    POCT Strep A, Molecular         Patient most likely has upper respiratory infection which is caused by a virus, explained to patient that most viral infection Will resolve uneventfully, and virus they do not respond to antibiotics.  If however the symptoms persist beyond 10 days and or If they get worse or she develops sinus pain on one side of the head, and green discharge, fever or trouble breathing that would be an indication for the use of antibiotics and the patient need to contact us at that time should that happen.  At this time it's okay to treat the symptoms.  Patient understood and acknowledged.    Encouraged patient to do Neti-Pot    Acute recurrent sinusitis, unspecified location    Cough, unspecified type  -     POCT COVID-19 Rapid Screening  -     POCT Influenza A/B Molecular  -     POCT Strep A, Molecular          Orders Placed This Encounter   Procedures    POCT COVID-19 Rapid Screening    POCT Influenza A/B Molecular    POCT Strep A, Molecular       No follow-ups on file.     Meghana Horton MD           "

## 2024-02-16 ENCOUNTER — OFFICE VISIT (OUTPATIENT)
Dept: INTERNAL MEDICINE | Facility: CLINIC | Age: 44
End: 2024-02-16
Payer: COMMERCIAL

## 2024-02-16 VITALS
HEART RATE: 98 BPM | SYSTOLIC BLOOD PRESSURE: 124 MMHG | DIASTOLIC BLOOD PRESSURE: 80 MMHG | TEMPERATURE: 97 F | WEIGHT: 172.19 LBS | BODY MASS INDEX: 27.02 KG/M2 | HEIGHT: 67 IN | OXYGEN SATURATION: 100 % | RESPIRATION RATE: 20 BRPM

## 2024-02-16 DIAGNOSIS — J40 SINOBRONCHITIS: Primary | ICD-10-CM

## 2024-02-16 DIAGNOSIS — J32.9 SINOBRONCHITIS: Primary | ICD-10-CM

## 2024-02-16 PROCEDURE — 3074F SYST BP LT 130 MM HG: CPT | Mod: CPTII,S$GLB,, | Performed by: INTERNAL MEDICINE

## 2024-02-16 PROCEDURE — 1159F MED LIST DOCD IN RCRD: CPT | Mod: CPTII,S$GLB,, | Performed by: INTERNAL MEDICINE

## 2024-02-16 PROCEDURE — 1160F RVW MEDS BY RX/DR IN RCRD: CPT | Mod: CPTII,S$GLB,, | Performed by: INTERNAL MEDICINE

## 2024-02-16 PROCEDURE — 3008F BODY MASS INDEX DOCD: CPT | Mod: CPTII,S$GLB,, | Performed by: INTERNAL MEDICINE

## 2024-02-16 PROCEDURE — 3079F DIAST BP 80-89 MM HG: CPT | Mod: CPTII,S$GLB,, | Performed by: INTERNAL MEDICINE

## 2024-02-16 PROCEDURE — 99999 PR PBB SHADOW E&M-EST. PATIENT-LVL V: CPT | Mod: PBBFAC,,, | Performed by: INTERNAL MEDICINE

## 2024-02-16 PROCEDURE — 99214 OFFICE O/P EST MOD 30 MIN: CPT | Mod: S$GLB,,, | Performed by: INTERNAL MEDICINE

## 2024-02-16 RX ORDER — AZITHROMYCIN 250 MG/1
TABLET, FILM COATED ORAL
Qty: 6 TABLET | Refills: 0 | Status: SHIPPED | OUTPATIENT
Start: 2024-02-16 | End: 2024-02-21

## 2024-02-16 RX ORDER — PROMETHAZINE HYDROCHLORIDE AND DEXTROMETHORPHAN HYDROBROMIDE 6.25; 15 MG/5ML; MG/5ML
5 SYRUP ORAL EVERY 8 HOURS PRN
Qty: 118 ML | Refills: 0 | Status: SHIPPED | OUTPATIENT
Start: 2024-02-16 | End: 2024-02-26

## 2024-02-16 NOTE — PROGRESS NOTES
Shasta Lara Young  43 y.o. Black or  female     Chief Complaint   Patient presents with    Cough        HPI: Reports having a cough for the past 2 weeks. Cough is productive of a yellowish mucous but recently started coughing up blood. Has been having a fever on and off and sinus pain/congestion. She has been taking her sinus medication without improvement.   She went to urgent care yesterday and was negative for strep, flu and Covid.     Past Medical History:   Diagnosis Date    Abnormal cervical Pap smear with positive HPV DNA test     Allergic rhinitis     Anemia     Herpes simplex type 2 infection     Onychomycosis     SAMIRA on CPAP     DOES NOT USE    Raynaud's phenomenon     Uterine fibroid      MEDS: Reviewed med card    ALLERGIES: Reviewed allergy card    PE: Reviewed vitals  GENERAL: Alert and oriented, no acute distress  ENT: No tonsillar exudate, mild pharyngeal erythema  NECK: No lymphadenopathy  HEART: Regular rate and rhythm  LUNGS: Unlabored respirations, bilaterally clear to auscultation     ASSESSMENT/PLAN:    Shasta was seen today for cough.    Diagnoses and all orders for this visit:    Sinobronchitis  -     azithromycin (Z-WICHO) 250 MG tablet; Take 2 tablets by mouth on day 1; Take 1 tablet by mouth on days 2-5  -     promethazine-dextromethorphan (PROMETHAZINE-DM) 6.25-15 mg/5 mL Syrp; Take 5 mLs by mouth every 8 (eight) hours as needed.  -     Recommend increased fluids and rest  -     Educational information provided    RTC: If symptoms worsen or fail to resolve

## 2024-02-23 ENCOUNTER — LAB VISIT (OUTPATIENT)
Dept: LAB | Facility: HOSPITAL | Age: 44
End: 2024-02-23
Attending: NURSE PRACTITIONER
Payer: COMMERCIAL

## 2024-02-23 ENCOUNTER — CLINICAL SUPPORT (OUTPATIENT)
Dept: PRIMARY CARE CLINIC | Facility: CLINIC | Age: 44
End: 2024-02-23
Payer: COMMERCIAL

## 2024-02-23 ENCOUNTER — OFFICE VISIT (OUTPATIENT)
Dept: PRIMARY CARE CLINIC | Facility: CLINIC | Age: 44
End: 2024-02-23
Payer: COMMERCIAL

## 2024-02-23 VITALS
HEART RATE: 97 BPM | OXYGEN SATURATION: 99 % | WEIGHT: 171.94 LBS | DIASTOLIC BLOOD PRESSURE: 78 MMHG | SYSTOLIC BLOOD PRESSURE: 129 MMHG | HEIGHT: 67 IN | TEMPERATURE: 98 F | BODY MASS INDEX: 26.99 KG/M2

## 2024-02-23 DIAGNOSIS — R06.02 SOB (SHORTNESS OF BREATH): Primary | ICD-10-CM

## 2024-02-23 DIAGNOSIS — I47.9 PAROXYSMAL TACHYCARDIA: ICD-10-CM

## 2024-02-23 DIAGNOSIS — R06.02 SOB (SHORTNESS OF BREATH): ICD-10-CM

## 2024-02-23 DIAGNOSIS — R00.2 PALPITATIONS: ICD-10-CM

## 2024-02-23 LAB
ALBUMIN SERPL BCP-MCNC: 3.9 G/DL (ref 3.5–5.2)
ALP SERPL-CCNC: 60 U/L (ref 55–135)
ALT SERPL W/O P-5'-P-CCNC: 17 U/L (ref 10–44)
ANION GAP SERPL CALC-SCNC: 9 MMOL/L (ref 8–16)
AST SERPL-CCNC: 31 U/L (ref 10–40)
BASOPHILS # BLD AUTO: 0.04 K/UL (ref 0–0.2)
BASOPHILS NFR BLD: 0.6 % (ref 0–1.9)
BILIRUB SERPL-MCNC: 0.3 MG/DL (ref 0.1–1)
BUN SERPL-MCNC: 12 MG/DL (ref 6–20)
CALCIUM SERPL-MCNC: 9.9 MG/DL (ref 8.7–10.5)
CHLORIDE SERPL-SCNC: 105 MMOL/L (ref 95–110)
CHOLEST SERPL-MCNC: 188 MG/DL (ref 120–199)
CHOLEST/HDLC SERPL: 2.6 {RATIO} (ref 2–5)
CO2 SERPL-SCNC: 23 MMOL/L (ref 23–29)
CREAT SERPL-MCNC: 0.9 MG/DL (ref 0.5–1.4)
DIFFERENTIAL METHOD BLD: NORMAL
EOSINOPHIL # BLD AUTO: 0.1 K/UL (ref 0–0.5)
EOSINOPHIL NFR BLD: 1.3 % (ref 0–8)
ERYTHROCYTE [DISTWIDTH] IN BLOOD BY AUTOMATED COUNT: 13.3 % (ref 11.5–14.5)
EST. GFR  (NO RACE VARIABLE): >60 ML/MIN/1.73 M^2
ESTIMATED AVG GLUCOSE: 105 MG/DL (ref 68–131)
FERRITIN SERPL-MCNC: 41 NG/ML (ref 20–300)
GLUCOSE SERPL-MCNC: 86 MG/DL (ref 70–110)
HBA1C MFR BLD: 5.3 % (ref 4–5.6)
HCT VFR BLD AUTO: 37.9 % (ref 37–48.5)
HDLC SERPL-MCNC: 71 MG/DL (ref 40–75)
HDLC SERPL: 37.8 % (ref 20–50)
HGB BLD-MCNC: 12.3 G/DL (ref 12–16)
IMM GRANULOCYTES # BLD AUTO: 0.02 K/UL (ref 0–0.04)
IMM GRANULOCYTES NFR BLD AUTO: 0.3 % (ref 0–0.5)
IRON SERPL-MCNC: 90 UG/DL (ref 30–160)
LDLC SERPL CALC-MCNC: 109.2 MG/DL (ref 63–159)
LYMPHOCYTES # BLD AUTO: 2.1 K/UL (ref 1–4.8)
LYMPHOCYTES NFR BLD: 30.5 % (ref 18–48)
MCH RBC QN AUTO: 29.7 PG (ref 27–31)
MCHC RBC AUTO-ENTMCNC: 32.5 G/DL (ref 32–36)
MCV RBC AUTO: 92 FL (ref 82–98)
MONOCYTES # BLD AUTO: 0.5 K/UL (ref 0.3–1)
MONOCYTES NFR BLD: 6.9 % (ref 4–15)
NEUTROPHILS # BLD AUTO: 4.1 K/UL (ref 1.8–7.7)
NEUTROPHILS NFR BLD: 60.4 % (ref 38–73)
NONHDLC SERPL-MCNC: 117 MG/DL
NRBC BLD-RTO: 0 /100 WBC
PLATELET # BLD AUTO: 329 K/UL (ref 150–450)
PMV BLD AUTO: 11.1 FL (ref 9.2–12.9)
POTASSIUM SERPL-SCNC: 4.3 MMOL/L (ref 3.5–5.1)
PROT SERPL-MCNC: 7.9 G/DL (ref 6–8.4)
RBC # BLD AUTO: 4.14 M/UL (ref 4–5.4)
SATURATED IRON: 20 % (ref 20–50)
SODIUM SERPL-SCNC: 137 MMOL/L (ref 136–145)
T3 SERPL-MCNC: 97 NG/DL (ref 60–180)
T4 FREE SERPL-MCNC: 1.08 NG/DL (ref 0.71–1.51)
TOTAL IRON BINDING CAPACITY: 440 UG/DL (ref 250–450)
TRANSFERRIN SERPL-MCNC: 297 MG/DL (ref 200–375)
TRIGL SERPL-MCNC: 39 MG/DL (ref 30–150)
TSH SERPL DL<=0.005 MIU/L-ACNC: 0.62 UIU/ML (ref 0.4–4)
WBC # BLD AUTO: 6.85 K/UL (ref 3.9–12.7)

## 2024-02-23 PROCEDURE — 84439 ASSAY OF FREE THYROXINE: CPT | Performed by: NURSE PRACTITIONER

## 2024-02-23 PROCEDURE — 3078F DIAST BP <80 MM HG: CPT | Mod: CPTII,S$GLB,, | Performed by: NURSE PRACTITIONER

## 2024-02-23 PROCEDURE — 99214 OFFICE O/P EST MOD 30 MIN: CPT | Mod: S$GLB,,, | Performed by: NURSE PRACTITIONER

## 2024-02-23 PROCEDURE — 3008F BODY MASS INDEX DOCD: CPT | Mod: CPTII,S$GLB,, | Performed by: NURSE PRACTITIONER

## 2024-02-23 PROCEDURE — 84443 ASSAY THYROID STIM HORMONE: CPT | Performed by: NURSE PRACTITIONER

## 2024-02-23 PROCEDURE — 99999 PR PBB SHADOW E&M-EST. PATIENT-LVL I: CPT | Mod: PBBFAC,,,

## 2024-02-23 PROCEDURE — 93005 ELECTROCARDIOGRAM TRACING: CPT | Mod: S$GLB,,, | Performed by: NURSE PRACTITIONER

## 2024-02-23 PROCEDURE — 83540 ASSAY OF IRON: CPT | Performed by: NURSE PRACTITIONER

## 2024-02-23 PROCEDURE — 80053 COMPREHEN METABOLIC PANEL: CPT | Performed by: NURSE PRACTITIONER

## 2024-02-23 PROCEDURE — 82728 ASSAY OF FERRITIN: CPT | Performed by: NURSE PRACTITIONER

## 2024-02-23 PROCEDURE — 36415 COLL VENOUS BLD VENIPUNCTURE: CPT | Mod: PN | Performed by: NURSE PRACTITIONER

## 2024-02-23 PROCEDURE — 85025 COMPLETE CBC W/AUTO DIFF WBC: CPT | Performed by: NURSE PRACTITIONER

## 2024-02-23 PROCEDURE — 93010 ELECTROCARDIOGRAM REPORT: CPT | Mod: S$GLB,,, | Performed by: INTERNAL MEDICINE

## 2024-02-23 PROCEDURE — 1159F MED LIST DOCD IN RCRD: CPT | Mod: CPTII,S$GLB,, | Performed by: NURSE PRACTITIONER

## 2024-02-23 PROCEDURE — 83036 HEMOGLOBIN GLYCOSYLATED A1C: CPT | Performed by: NURSE PRACTITIONER

## 2024-02-23 PROCEDURE — 86376 MICROSOMAL ANTIBODY EACH: CPT | Performed by: NURSE PRACTITIONER

## 2024-02-23 PROCEDURE — 84480 ASSAY TRIIODOTHYRONINE (T3): CPT | Performed by: NURSE PRACTITIONER

## 2024-02-23 PROCEDURE — 1160F RVW MEDS BY RX/DR IN RCRD: CPT | Mod: CPTII,S$GLB,, | Performed by: NURSE PRACTITIONER

## 2024-02-23 PROCEDURE — 3044F HG A1C LEVEL LT 7.0%: CPT | Mod: CPTII,S$GLB,, | Performed by: NURSE PRACTITIONER

## 2024-02-23 PROCEDURE — 99999 PR PBB SHADOW E&M-EST. PATIENT-LVL IV: CPT | Mod: PBBFAC,,, | Performed by: NURSE PRACTITIONER

## 2024-02-23 PROCEDURE — 80061 LIPID PANEL: CPT | Performed by: NURSE PRACTITIONER

## 2024-02-23 PROCEDURE — 3074F SYST BP LT 130 MM HG: CPT | Mod: CPTII,S$GLB,, | Performed by: NURSE PRACTITIONER

## 2024-02-23 PROCEDURE — 83520 IMMUNOASSAY QUANT NOS NONAB: CPT | Performed by: NURSE PRACTITIONER

## 2024-02-23 RX ORDER — METOPROLOL SUCCINATE 25 MG/1
25 TABLET, EXTENDED RELEASE ORAL DAILY PRN
Qty: 30 TABLET | Refills: 1 | Status: SHIPPED | OUTPATIENT
Start: 2024-02-23 | End: 2024-03-18

## 2024-02-23 NOTE — PROGRESS NOTES
Chief Complaint  Chief Complaint   Patient presents with    Shortness of Breath         HPI     HPI  Shasta Rivera is a 43 y.o. female with medical diagnoses as listed in the medical history and problem list that presents for SOB/palpitation. This patient is new to me.    SOB/palpitation: Diagnosis of acute bronchitis a week ago. Has regular allergy and sinus issues. Treated with a ZPAK and Promethazine-DM. Also has a history of iron deficiency. Hx of suppressed TSH approx 6-8 months ago. Endorses palpitations that are infrequent. HR in clinic rages from 87 bpm to 124 bpm at rest and during activity. Reports her apple watch reported a HR of 140+ while at home. Apple watch is insyc with HR staff is measuring in clinic today. She denies a cardiac or DM history. Updated medication list with no new medication.       History     PAST MEDICAL HISTORY:  Past Medical History:   Diagnosis Date    Abnormal cervical Pap smear with positive HPV DNA test     Allergic rhinitis     Anemia     Herpes simplex type 2 infection     Onychomycosis     SAMIRA on CPAP     DOES NOT USE    Raynaud's phenomenon     Uterine fibroid        PAST SURGICAL HISTORY:  Past Surgical History:   Procedure Laterality Date    DILATION AND CURETTAGE OF UTERUS      for EAB    LUMBAR EPIDURAL INJECTION      MYOMECTOMY N/A 10/11/2021    Procedure: MYOMECTOMY;  Surgeon: Carol Ann Casarez MD;  Location: [facility] OR;  Service: OB/GYN;  Laterality: N/A;       SOCIAL HISTORY:  Social History     Socioeconomic History    Marital status:    Occupational History     Employer: [employer]   Tobacco Use    Smoking status: Never    Smokeless tobacco: Never   Substance and Sexual Activity    Alcohol use: Yes     Alcohol/week: 7.0 standard drinks of alcohol     Types: 7 Glasses of wine per week    Drug use: No    Sexual activity: Yes     Partners: Male   Social History Narrative    The patient is  who lives alone and works as a .  She has a Masters in  Civil Engineering.       FAMILY HISTORY:  Family History   Problem Relation Age of Onset    Diabetes Mother     Hypertension Mother     Hyperlipidemia Mother     Colon cancer Father     Hyperlipidemia Father     Prostate cancer Father     Breast cancer Maternal Grandmother     Cancer Maternal Grandmother     Colon cancer Maternal Grandmother     Stroke Maternal Grandfather     Breast cancer Paternal Grandmother     Diabetes Paternal Grandmother     Hypertension Paternal Grandmother     Uterine cancer Paternal Grandmother     Sleep apnea Brother     Breast cancer Other        ALLERGIES AND MEDICATIONS: updated and reviewed.  Review of patient's allergies indicates:  No Known Allergies  Current Outpatient Medications   Medication Sig Dispense Refill    12 HOUR DECONGESTANT 120 mg 12 hr tablet TAKE 1 TABLET BY MOUTH 2 TIMES DAILY. 60 tablet 5    ALPRAZolam (XANAX) 0.25 MG tablet TAKE 1-2 TABLETS BY MOUTH 3 TIMES DAILY AS NEEDED FOR ANXIETY. 30 tablet 3    buPROPion (WELLBUTRIN) 100 MG tablet Take 1 tablet (100 mg total) by mouth once daily. 30 tablet 5    clobetasoL (CLOBEX) 0.05 % shampoo Apply topically.      docusate sodium (COLACE) 100 MG capsule Take 1 capsule (100 mg total) by mouth 2 (two) times daily. 30 capsule 0    fluticasone propionate (FLONASE) 50 mcg/actuation nasal spray USE 2 SPRAYS IN EACH NOSTRIL DAILY AT 6 AM 16 mL 3    HYDROcodone-acetaminophen (NORCO)  mg per tablet Take 1 tablet by mouth 4 (four) times daily as needed.      ibuprofen (ADVIL,MOTRIN) 800 MG tablet Take 1 tablet (800 mg total) by mouth every 8 (eight) hours. 30 tablet 0    levocetirizine (XYZAL) 5 MG tablet Take 1 tablet (5 mg total) by mouth every evening. 90 tablet 1    metoprolol succinate (TOPROL-XL) 25 MG 24 hr tablet Take 1 tablet (25 mg total) by mouth daily as needed. 30 tablet 1    multivitamin capsule Take 1 capsule by mouth once daily.      polyethylene glycol (GLYCOLAX) 17 gram PwPk Take 17 g by mouth daily as  "needed.  0    promethazine-dextromethorphan (PROMETHAZINE-DM) 6.25-15 mg/5 mL Syrp Take 5 mLs by mouth every 8 (eight) hours as needed. 118 mL 0    RETIN-A MICRO PUMP 0.06 % GlwP Apply 1 application topically nightly.      valACYclovir (VALTREX) 500 MG tablet Take 1 tablet (500 mg total) by mouth once daily. 90 tablet 3    zolpidem (AMBIEN) 5 MG Tab Take 1 tablet (5 mg total) by mouth nightly as needed (sleep). 30 tablet 5     No current facility-administered medications for this visit.           Exam     ROS  Review of Systems   Constitutional:  Negative for appetite change, chills, fatigue and fever.   HENT:  Negative for congestion, ear pain, postnasal drip, rhinorrhea, sinus pressure, sneezing and sore throat.    Respiratory:  Positive for shortness of breath.    Cardiovascular:  Positive for palpitations. Negative for chest pain.   Gastrointestinal:  Negative for abdominal pain, constipation, diarrhea, nausea and vomiting.   Genitourinary:  Negative for dysuria.   Musculoskeletal:  Negative for arthralgias.   Neurological:  Negative for headaches.   Psychiatric/Behavioral:  The patient is nervous/anxious.            Physical Exam  Vitals:    02/23/24 1400   BP: 129/78   Pulse: 97   Temp: 97.6 °F (36.4 °C)   SpO2: 99%   Weight: 78 kg (171 lb 15.3 oz)   Height: 5' 7" (1.702 m)    Body mass index is 26.93 kg/m².  Weight: 78 kg (171 lb 15.3 oz)   Height: 5' 7" (170.2 cm)   Physical Exam  Constitutional:       General: She is not in acute distress.     Appearance: Normal appearance.   HENT:      Head: Normocephalic and atraumatic.      Right Ear: External ear normal.      Left Ear: External ear normal.      Nose: Nose normal.   Eyes:      Pupils: Pupils are equal, round, and reactive to light.   Cardiovascular:      Rate and Rhythm: Normal rate and regular rhythm.   Pulmonary:      Effort: Pulmonary effort is normal. No respiratory distress.      Breath sounds: Normal breath sounds. No wheezing.   Abdominal:      " General: Bowel sounds are normal.      Palpations: Abdomen is soft.   Musculoskeletal:      Cervical back: Neck supple.   Lymphadenopathy:      Cervical: No cervical adenopathy.   Skin:     General: Skin is warm and dry.   Neurological:      Mental Status: She is alert and oriented to person, place, and time.   Psychiatric:         Mood and Affect: Mood normal.             Health Maintenance         Date Due Completion Date    Cervical Cancer Screening 03/02/2024 3/2/2021    Override on 1/5/2016: Not Clinically Appropriate    Override on 11/15/2013: Done    Override on 9/10/2012: Done    Lipid Panel 05/23/2024 5/23/2023    Override on 1/5/2016: Not Clinically Appropriate    Mammogram 10/19/2024 10/19/2023    Hemoglobin A1c (Diabetic Prevention Screening) 05/23/2026 5/23/2023    TETANUS VACCINE 05/23/2033 5/23/2023              Assessment & Plan     Assessment & Plan  Problem List Items Addressed This Visit    None  Visit Diagnoses       SOB (shortness of breath)    -  Primary    Relevant Orders    IN OFFICE EKG 12-LEAD (to Muse)    CBC Auto Differential    Comprehensive Metabolic Panel    Lipid Panel    Hemoglobin A1C    TSH    T4, Free    T3    THYROID PEROXIDASE ANTIBODY    THYROTROPIN RECEPTOR ANTIBODY    Iron and TIBC    FERRITIN    Paroxysmal tachycardia      -We discussed effective administration of Toprol, adverse effects and side effects     Relevant Medications    metoprolol succinate (TOPROL-XL) 25 MG 24 hr tablet    Other Relevant Orders    IN OFFICE EKG 12-LEAD (to Muse)    CBC Auto Differential    Comprehensive Metabolic Panel    Lipid Panel    Hemoglobin A1C    TSH    T4, Free    T3    THYROID PEROXIDASE ANTIBODY    THYROTROPIN RECEPTOR ANTIBODY    Iron and TIBC    FERRITIN    Palpitations        Relevant Orders    CBC Auto Differential    Comprehensive Metabolic Panel    Lipid Panel    Hemoglobin A1C    TSH    T4, Free    T3    THYROID PEROXIDASE ANTIBODY    THYROTROPIN RECEPTOR ANTIBODY    Iron and  [facility]              Health Maintenance reviewed: Deferred at this time    Follow-up: With MARIANN Holder in one week    20+ minutes of total time spent on the encounter, which includes face to face time and non-face to face time preparing to see the patient (eg, review of tests), Obtaining and/or reviewing separately obtained history, documenting clinical information in the electronic or other health record, independently interpreting results (not separately reported) and communicating results to the patient/family/caregiver, or Care coordination (not separately reported).      Sincerely,  Gallo Bonilla NP

## 2024-02-24 LAB
OHS QRS DURATION: 88 MS
OHS QTC CALCULATION: 457 MS
THYROPEROXIDASE IGG SERPL-ACNC: <6 IU/ML

## 2024-02-26 ENCOUNTER — TELEPHONE (OUTPATIENT)
Dept: PRIMARY CARE CLINIC | Facility: CLINIC | Age: 44
End: 2024-02-26
Payer: COMMERCIAL

## 2024-02-26 LAB — TSH RECEP AB SER-ACNC: <1.1 IU/L (ref 0–1.75)

## 2024-02-26 NOTE — TELEPHONE ENCOUNTER
We discussed lab findings   2. No variants noted that would suggest acute changes  Advised to continue monitoring HR. Take Metoprolol as needed  Schedule appt if symptoms persist.    Sincerely,  Gallo Bonilla NP

## 2024-03-17 DIAGNOSIS — I47.9 PAROXYSMAL TACHYCARDIA: ICD-10-CM

## 2024-03-18 RX ORDER — METOPROLOL SUCCINATE 25 MG/1
25 TABLET, EXTENDED RELEASE ORAL DAILY PRN
Qty: 90 TABLET | Refills: 1 | Status: SHIPPED | OUTPATIENT
Start: 2024-03-18

## 2024-03-25 RX ORDER — ZOLPIDEM TARTRATE 5 MG/1
TABLET ORAL
Qty: 30 TABLET | Refills: 1 | Status: SHIPPED | OUTPATIENT
Start: 2024-03-25 | End: 2024-06-17

## 2024-03-25 NOTE — TELEPHONE ENCOUNTER
No care due was identified.  [facility] Embedded Care Due Messages. Reference number: [id number].   3/24/2024 10:54:46 PM CDT

## 2024-04-29 ENCOUNTER — PATIENT OUTREACH (OUTPATIENT)
Dept: ADMINISTRATIVE | Facility: HOSPITAL | Age: 44
End: 2024-04-29
Payer: COMMERCIAL

## 2024-04-29 ENCOUNTER — PATIENT MESSAGE (OUTPATIENT)
Dept: ADMINISTRATIVE | Facility: HOSPITAL | Age: 44
End: 2024-04-29
Payer: COMMERCIAL

## 2024-04-29 DIAGNOSIS — Z12.11 COLON CANCER SCREENING: Primary | ICD-10-CM

## 2024-04-29 NOTE — PROGRESS NOTES
Replying to Campaign Questionnaire for Overdue HM: Pap smear     Pt scheduled for 5-13 @10 am for pt to have pap   Will f/u

## 2024-05-14 NOTE — PROGRESS NOTES
Pt canceled Pap Smear and stated that she will get her OBGYN to do her pap at her wellness appt when she goes.

## 2024-05-19 NOTE — TELEPHONE ENCOUNTER
Care Due:                  Date            Visit Type   Department     Provider  --------------------------------------------------------------------------------                                MYCHART                              ANNUAL                              CHECKUP/PHY  [facility]  Last Visit: 05-      [facility]       Mary Redmond  Next Visit: None Scheduled  None         None Found                                                            Last  Test          Frequency    Reason                     Performed    Due Date  --------------------------------------------------------------------------------    Office Visit  15 months..  buPROPion, valACYclovir..  05-   08-    [facility] Embedded Care Due Messages. Reference number: [id number].   5/19/2024 10:22:43 AM CDT

## 2024-05-20 RX ORDER — PSEUDOEPHEDRINE HCL 120 MG
120 TABLET, EXTENDED RELEASE ORAL 2 TIMES DAILY
Qty: 60 TABLET | Refills: 0 | Status: SHIPPED | OUTPATIENT
Start: 2024-05-20

## 2024-05-24 ENCOUNTER — OFFICE VISIT (OUTPATIENT)
Dept: FAMILY MEDICINE | Facility: CLINIC | Age: 44
End: 2024-05-24
Payer: COMMERCIAL

## 2024-05-24 ENCOUNTER — LAB VISIT (OUTPATIENT)
Dept: LAB | Facility: HOSPITAL | Age: 44
End: 2024-05-24
Attending: FAMILY MEDICINE
Payer: COMMERCIAL

## 2024-05-24 VITALS
BODY MASS INDEX: 27.61 KG/M2 | HEIGHT: 67 IN | DIASTOLIC BLOOD PRESSURE: 80 MMHG | OXYGEN SATURATION: 97 % | TEMPERATURE: 98 F | WEIGHT: 175.94 LBS | HEART RATE: 80 BPM | SYSTOLIC BLOOD PRESSURE: 112 MMHG

## 2024-05-24 DIAGNOSIS — M54.41 CHRONIC BILATERAL LOW BACK PAIN WITH RIGHT-SIDED SCIATICA: ICD-10-CM

## 2024-05-24 DIAGNOSIS — D25.0 INTRAMURAL AND SUBMUCOUS LEIOMYOMA OF UTERUS: ICD-10-CM

## 2024-05-24 DIAGNOSIS — G89.29 CHRONIC BILATERAL LOW BACK PAIN WITH RIGHT-SIDED SCIATICA: ICD-10-CM

## 2024-05-24 DIAGNOSIS — D25.1 INTRAMURAL AND SUBMUCOUS LEIOMYOMA OF UTERUS: ICD-10-CM

## 2024-05-24 DIAGNOSIS — N92.0 MENORRHAGIA WITH REGULAR CYCLE: ICD-10-CM

## 2024-05-24 DIAGNOSIS — M79.89 SWELLING OF BOTH LOWER EXTREMITIES: Primary | ICD-10-CM

## 2024-05-24 DIAGNOSIS — M79.89 SWELLING OF BOTH LOWER EXTREMITIES: ICD-10-CM

## 2024-05-24 PROCEDURE — 80053 COMPREHEN METABOLIC PANEL: CPT | Performed by: FAMILY MEDICINE

## 2024-05-24 PROCEDURE — 3044F HG A1C LEVEL LT 7.0%: CPT | Mod: CPTII,S$GLB,, | Performed by: FAMILY MEDICINE

## 2024-05-24 PROCEDURE — 36415 COLL VENOUS BLD VENIPUNCTURE: CPT | Mod: PO | Performed by: FAMILY MEDICINE

## 2024-05-24 PROCEDURE — 1159F MED LIST DOCD IN RCRD: CPT | Mod: CPTII,S$GLB,, | Performed by: FAMILY MEDICINE

## 2024-05-24 PROCEDURE — 1160F RVW MEDS BY RX/DR IN RCRD: CPT | Mod: CPTII,S$GLB,, | Performed by: FAMILY MEDICINE

## 2024-05-24 PROCEDURE — 83880 ASSAY OF NATRIURETIC PEPTIDE: CPT | Performed by: FAMILY MEDICINE

## 2024-05-24 PROCEDURE — 99214 OFFICE O/P EST MOD 30 MIN: CPT | Mod: S$GLB,,, | Performed by: FAMILY MEDICINE

## 2024-05-24 PROCEDURE — 99999 PR PBB SHADOW E&M-EST. PATIENT-LVL IV: CPT | Mod: PBBFAC,,, | Performed by: FAMILY MEDICINE

## 2024-05-24 PROCEDURE — 3079F DIAST BP 80-89 MM HG: CPT | Mod: CPTII,S$GLB,, | Performed by: FAMILY MEDICINE

## 2024-05-24 PROCEDURE — 3074F SYST BP LT 130 MM HG: CPT | Mod: CPTII,S$GLB,, | Performed by: FAMILY MEDICINE

## 2024-05-24 PROCEDURE — 3008F BODY MASS INDEX DOCD: CPT | Mod: CPTII,S$GLB,, | Performed by: FAMILY MEDICINE

## 2024-05-24 RX ORDER — FUROSEMIDE 20 MG/1
20 TABLET ORAL DAILY PRN
Qty: 20 TABLET | Refills: 0 | Status: SHIPPED | OUTPATIENT
Start: 2024-05-24 | End: 2025-05-24

## 2024-05-24 NOTE — PROGRESS NOTES
CHIEF COMPLAINT:  This is a 44-year-old female complaining of swelling in ankles and feet.     SUBJECTIVE:  Patient complains of a 2 to three-week history of swelling in ankles and feet.  Patient was placed on hormonal therapy to prevent bleeding during her recent myomectomy 1-1/2-2 months ago.  Hormones were changed during this course to prevent swelling.  Patient had myomectomy one-week ago and has been off hormones since that time with no improvement.  She had right arm and hand swelling after surgery which resolved.  She will be undergoing IVF.  The patient denies chest pain, shortness and breath, palpitations or wheezing.  She denies recent increase in salt intake.  She has not been elevating her lower extremities.      The patient has a history of anemia. She occasionally uses alprazolam for anxiety.  She takes zolpidem 5 mg for sleeplessness.  She takes pseudoephedrine and Zyrtec for allergic rhinitis.  The patient often has numbness and discoloration of finger tips especially when exposed to cold. Patient has positive serology for herpes simplex 2.  Patient sees pain management for bulging discs and chronic back pain for which she takes opioids as needed.  She continues to have heavy menses.     ROS:  GENERAL: Patient denies fever, chills, night sweats. Patient denies weight gain or loss. Patient denies anorexia, fatigue, weakness or swollen glands.  SKIN: Patient denies rash or hair loss.  HEENT: Patient denies sore throat, ear pain, hearing loss, nasal congestion, or runny nose. Patient denies visual disturbance, eye irritation or discharge.  LUNGS: Patient denies cough, wheeze or hemoptysis.  CARDIOVASCULAR: Patient denies chest pain, shortness of breath, palpitations, or syncope.  Positive for lower extremity edema.   GI: Patient denies abdominal pain, nausea, vomiting, diarrhea, constipation, blood in stool or melena.  GENITOURINARY: Patient denies pelvic pain, vaginal discharge, itch or odor.  Patient  denies dysuria, frequency, hematuria, nocturia, urgency or incontinence.  Positive for heavy menses.  MUSCULOSKELETAL: Patient denies joint pain, swelling, redness or warmth.  NEUROLOGIC: Patient denies headache, vertigo, paresthesias, weakness in limb, dysarthria, dysphagia or abnormality of gait.     OBJECTIVE:   GENERAL: Well-developed well-nourished slightly groin black female alert and oriented x3, in no acute distress. Memory, judgment and cognition without deficit.   SKIN: Clear without rash. Normal color and tone.  HEENT: Eyes: Clear conjunctivae.  No scleral icterus.  NECK: Supple, normal range of motion. No masses, lymphadenopathy or enlarged thyroid. No JVD. Carotids 2+ and equal. No bruits.  LUNGS: Clear to auscultation. Normal respiratory effort.  CARDIOVASCULAR: Regular rhythm, normal S1, S2 without murmur, gallop or rub.  ABDOMEN: Normal appearance. Active bowel sounds. Soft, nontender without mass or organomegaly. No rebound or guarding.  EXTREMITIES: Without cyanosis or clubbing.  1+ bilateral lower extremity swelling mostly in feet and ankles.   Distal pulses 2+ and equal. Normal range of motion in all extremities. No joint effusion, erythema or warmth.  NEUROLOGIC:  Motor strength equal bilaterally. Sensation normal to touch.  Gait without abnormality. No tremor.     ASSESSMENT:  1. Swelling of both lower extremities    2. Chronic bilateral low back pain with right-sided sciatica    3. Intramural and submucous leiomyoma of uterus    4. Menorrhagia with regular cycle      PLAN:  1. Check CMP and BNP.  2. Furosemide 20 mg daily as needed.    3. Increase potassium rich foods in diet.    4. Elevate lower extremities.    5. Restrict salt in diet.    6. Follow-up if no improvement or worsening symptoms.    30 minutes of total time spent on the encounter, which includes face to face time and non-face to face time preparing to see the patient (eg, review of tests), Obtaining and/or reviewing separately  obtained history, Documenting clinical information in the electronic or other health record, Independently interpreting results (not separately reported) and communicating results to the patient/family/caregiver, or Care coordination (not separately reported).     This note is generated with speech recognition software and is subject to transcription error and sound alike phrases that may be missed by proofreading.

## 2024-05-25 LAB
ALBUMIN SERPL BCP-MCNC: 3.6 G/DL (ref 3.5–5.2)
ALP SERPL-CCNC: 60 U/L (ref 55–135)
ALT SERPL W/O P-5'-P-CCNC: 33 U/L (ref 10–44)
ANION GAP SERPL CALC-SCNC: 11 MMOL/L (ref 8–16)
AST SERPL-CCNC: 23 U/L (ref 10–40)
BILIRUB SERPL-MCNC: 0.2 MG/DL (ref 0.1–1)
BNP SERPL-MCNC: <10 PG/ML (ref 0–99)
BUN SERPL-MCNC: 14 MG/DL (ref 6–20)
CALCIUM SERPL-MCNC: 9.7 MG/DL (ref 8.7–10.5)
CHLORIDE SERPL-SCNC: 104 MMOL/L (ref 95–110)
CO2 SERPL-SCNC: 23 MMOL/L (ref 23–29)
CREAT SERPL-MCNC: 0.9 MG/DL (ref 0.5–1.4)
EST. GFR  (NO RACE VARIABLE): >60 ML/MIN/1.73 M^2
GLUCOSE SERPL-MCNC: 91 MG/DL (ref 70–110)
POTASSIUM SERPL-SCNC: 4.5 MMOL/L (ref 3.5–5.1)
PROT SERPL-MCNC: 7.6 G/DL (ref 6–8.4)
SODIUM SERPL-SCNC: 138 MMOL/L (ref 136–145)

## 2024-06-13 RX ORDER — VALACYCLOVIR HYDROCHLORIDE 500 MG/1
500 TABLET, FILM COATED ORAL
Qty: 90 TABLET | Refills: 2 | Status: SHIPPED | OUTPATIENT
Start: 2024-06-13

## 2024-06-13 NOTE — TELEPHONE ENCOUNTER
No care due was identified.  [facility] Embedded Care Due Messages. Reference number: [id number].   6/13/2024 1:31:33 AM CDT

## 2024-06-13 NOTE — TELEPHONE ENCOUNTER
Refill Decision Note   Shasta Rivera  is requesting a refill authorization.  Brief Assessment and Rationale for Refill:  Approve     Medication Therapy Plan:         Comments:     Note composed:3:48 AM 06/13/2024

## 2024-06-16 NOTE — TELEPHONE ENCOUNTER
No care due was identified.  [facility] Embedded Care Due Messages. Reference number: [id number].   6/16/2024 12:31:15 AM CDT

## 2024-06-17 RX ORDER — ZOLPIDEM TARTRATE 5 MG/1
TABLET ORAL
Qty: 30 TABLET | Refills: 1 | Status: SHIPPED | OUTPATIENT
Start: 2024-06-17

## 2024-06-22 ENCOUNTER — PATIENT MESSAGE (OUTPATIENT)
Dept: FAMILY MEDICINE | Facility: CLINIC | Age: 44
End: 2024-06-22
Payer: COMMERCIAL

## 2024-06-24 ENCOUNTER — OFFICE VISIT (OUTPATIENT)
Dept: SURGERY | Facility: CLINIC | Age: 44
End: 2024-06-24
Payer: COMMERCIAL

## 2024-06-24 ENCOUNTER — HOSPITAL ENCOUNTER (OUTPATIENT)
Dept: RADIOLOGY | Facility: HOSPITAL | Age: 44
Discharge: HOME | End: 2024-06-24
Attending: SURGERY
Payer: COMMERCIAL

## 2024-06-24 VITALS
SYSTOLIC BLOOD PRESSURE: 124 MMHG | DIASTOLIC BLOOD PRESSURE: 81 MMHG | BODY MASS INDEX: 27.02 KG/M2 | WEIGHT: 172.19 LBS | HEART RATE: 94 BPM | TEMPERATURE: 98 F | HEIGHT: 67 IN

## 2024-06-24 DIAGNOSIS — Z91.89 AT HIGH RISK FOR BREAST CANCER: Primary | ICD-10-CM

## 2024-06-24 DIAGNOSIS — Z91.89 AT HIGH RISK FOR BREAST CANCER: ICD-10-CM

## 2024-06-24 PROCEDURE — 3079F DIAST BP 80-89 MM HG: CPT | Mod: CPTII,S$GLB,, | Performed by: SURGERY

## 2024-06-24 PROCEDURE — 77049 MRI BREAST C-+ W/CAD BI: CPT | Mod: 26,,, | Performed by: RADIOLOGY

## 2024-06-24 PROCEDURE — A9577 INJ MULTIHANCE: HCPCS | Performed by: SURGERY

## 2024-06-24 PROCEDURE — 99214 OFFICE O/P EST MOD 30 MIN: CPT | Mod: S$GLB,,, | Performed by: SURGERY

## 2024-06-24 PROCEDURE — 3044F HG A1C LEVEL LT 7.0%: CPT | Mod: CPTII,S$GLB,, | Performed by: SURGERY

## 2024-06-24 PROCEDURE — 1160F RVW MEDS BY RX/DR IN RCRD: CPT | Mod: CPTII,S$GLB,, | Performed by: SURGERY

## 2024-06-24 PROCEDURE — 3008F BODY MASS INDEX DOCD: CPT | Mod: CPTII,S$GLB,, | Performed by: SURGERY

## 2024-06-24 PROCEDURE — 77049 MRI BREAST C-+ W/CAD BI: CPT | Mod: TC

## 2024-06-24 PROCEDURE — 3074F SYST BP LT 130 MM HG: CPT | Mod: CPTII,S$GLB,, | Performed by: SURGERY

## 2024-06-24 PROCEDURE — 99999 PR PBB SHADOW E&M-EST. PATIENT-LVL IV: CPT | Mod: PBBFAC,,, | Performed by: SURGERY

## 2024-06-24 PROCEDURE — 25500020 PHARM REV CODE 255: Performed by: SURGERY

## 2024-06-24 PROCEDURE — 1159F MED LIST DOCD IN RCRD: CPT | Mod: CPTII,S$GLB,, | Performed by: SURGERY

## 2024-06-24 RX ADMIN — GADOBENATE DIMEGLUMINE 15 ML: 529 INJECTION, SOLUTION INTRAVENOUS at 09:06

## 2024-06-24 NOTE — PROGRESS NOTES
History & Physical    SUBJECTIVE:     History of Present Illness:  Patient is a 44 y.o. female presents for high-risk breast cancer screening.  She denies any changes with her breast including breast mass, nipple discharge, breast pain.  MRI done this morning currently pending.    Initially referred for high risk breast cancer screening. She was recently noted to have elevated tyrer cuzik score on recent mammogram. Denies any breast mass, pain, nipple discharge, or other changes. No prior breast surgery or abnormal mammograms. Family history of breast cancer in maternal and paternal grandmothers. Menarche age 10, , eggs frozen and considering pregnancy in near future.    No chief complaint on file.      Review of patient's allergies indicates:  No Known Allergies    Current Outpatient Medications   Medication Sig Dispense Refill    12 HOUR DECONGESTANT 120 mg 12 hr tablet TAKE 1 TABLET BY MOUTH TWICE A DAY 60 tablet 0    ALPRAZolam (XANAX) 0.25 MG tablet TAKE 1-2 TABLETS BY MOUTH 3 TIMES DAILY AS NEEDED FOR ANXIETY. 30 tablet 3    buPROPion (WELLBUTRIN) 100 MG tablet Take 1 tablet (100 mg total) by mouth once daily. 30 tablet 5    clobetasoL (CLOBEX) 0.05 % shampoo Apply topically.      docusate sodium (COLACE) 100 MG capsule Take 1 capsule (100 mg total) by mouth 2 (two) times daily. 30 capsule 0    fluticasone propionate (FLONASE) 50 mcg/actuation nasal spray USE 2 SPRAYS IN EACH NOSTRIL DAILY AT 6 AM 16 mL 3    furosemide (LASIX) 20 MG tablet Take 1 tablet (20 mg total) by mouth daily as needed (leg swelling). 20 tablet 0    HYDROcodone-acetaminophen (NORCO)  mg per tablet Take 1 tablet by mouth 4 (four) times daily as needed.      ibuprofen (ADVIL,MOTRIN) 800 MG tablet Take 1 tablet (800 mg total) by mouth every 8 (eight) hours. 30 tablet 0    metoprolol succinate (TOPROL-XL) 25 MG 24 hr tablet TAKE 1 TABLET BY MOUTH DAILY AS NEEDED. 90 tablet 1    multivitamin capsule Take 1 capsule by mouth once  daily.      polyethylene glycol (GLYCOLAX) 17 gram PwPk Take 17 g by mouth daily as needed.  0    RETIN-A MICRO PUMP 0.06 % GlwP Apply 1 application topically nightly.      valACYclovir (VALTREX) 500 MG tablet TAKE 1 TABLET BY MOUTH EVERY DAY 90 tablet 2    zolpidem (AMBIEN) 5 MG Tab TAKE 1 TABLET BY MOUTH EVERY DAY AT NIGHT AS NEEDED FOR SLEEP 30 tablet 1    levocetirizine (XYZAL) 5 MG tablet Take 1 tablet (5 mg total) by mouth every evening. 90 tablet 1     No current facility-administered medications for this visit.       Past Medical History:   Diagnosis Date    Abnormal cervical Pap smear with positive HPV DNA test     Allergic rhinitis     Anemia     Herpes simplex type 2 infection     Onychomycosis     SAMIRA on CPAP     DOES NOT USE    Raynaud's phenomenon     Uterine fibroid      Past Surgical History:   Procedure Laterality Date    DILATION AND CURETTAGE OF UTERUS      for EAB    LUMBAR EPIDURAL INJECTION      MYOMECTOMY N/A 10/11/2021    Procedure: MYOMECTOMY;  Surgeon: Carol Ann Casarez MD;  Location: [facility];  Service: OB/GYN;  Laterality: N/A;     Family History   Problem Relation Name Age of Onset    Diabetes Mother      Hypertension Mother      Hyperlipidemia Mother      Colon cancer Father      Hyperlipidemia Father      Prostate cancer Father      Breast cancer Maternal Grandmother      Cancer Maternal Grandmother      Colon cancer Maternal Grandmother      Stroke Maternal Grandfather      Breast cancer Paternal Grandmother      Diabetes Paternal Grandmother      Hypertension Paternal Grandmother      Uterine cancer Paternal Grandmother      Sleep apnea Brother      Breast cancer Other Great Aunt      Social History     Tobacco Use    Smoking status: Never    Smokeless tobacco: Never   Substance Use Topics    Alcohol use: Yes     Alcohol/week: 7.0 standard drinks of alcohol     Types: 7 Glasses of wine per week    Drug use: No        Review of Systems:  Review of Systems   Constitutional:  Negative  "for chills, fatigue, fever and unexpected weight change.   Respiratory:  Negative for cough, shortness of breath, wheezing and stridor.    Cardiovascular:  Negative for chest pain, palpitations and leg swelling.   Gastrointestinal:  Negative for abdominal distention, abdominal pain, constipation, diarrhea, nausea and vomiting.   Genitourinary:  Negative for difficulty urinating, dysuria, frequency, hematuria and urgency.   Skin:  Negative for color change, pallor, rash and wound.   Hematological:  Does not bruise/bleed easily.       OBJECTIVE:     Vital Signs (Most Recent)  Temp: 98.3 °F (36.8 °C) (06/24/24 0941)  Pulse: 94 (06/24/24 0941)  BP: 124/81 (06/24/24 0941)  5' 7" (1.702 m)  78.1 kg (172 lb 2.9 oz)     Physical Exam:  Physical Exam  Vitals reviewed.   Constitutional:       General: She is not in acute distress.     Appearance: She is well-developed. She is not diaphoretic.   HENT:      Head: Normocephalic and atraumatic.      Right Ear: External ear normal.      Left Ear: External ear normal.   Eyes:      General: No scleral icterus.     Conjunctiva/sclera: Conjunctivae normal.      Pupils: Pupils are equal, round, and reactive to light.   Neck:      Thyroid: No thyromegaly.      Trachea: No tracheal deviation.   Cardiovascular:      Rate and Rhythm: Normal rate and regular rhythm.      Heart sounds: Normal heart sounds. No murmur heard.     No friction rub. No gallop.   Pulmonary:      Effort: Pulmonary effort is normal. No respiratory distress.      Breath sounds: Normal breath sounds. No wheezing or rales.   Chest:      Chest wall: No tenderness.   Breasts:     Right: No inverted nipple, mass, nipple discharge, skin change or tenderness.      Left: No inverted nipple, mass, nipple discharge, skin change or tenderness.   Abdominal:      General: Bowel sounds are normal. There is no distension.      Palpations: Abdomen is soft.      Tenderness: There is no abdominal tenderness.      Hernia: No hernia is " present.   Musculoskeletal:         General: No tenderness or deformity. Normal range of motion.      Cervical back: Normal range of motion and neck supple.   Lymphadenopathy:      Cervical: No cervical adenopathy.   Skin:     General: Skin is warm and dry.      Coloration: Skin is not pale.      Findings: No erythema or rash.   Neurological:      Mental Status: She is alert and oriented to person, place, and time.   Psychiatric:         Behavior: Behavior normal.         Thought Content: Thought content normal.         Judgment: Judgment normal.         Diagnostic Results:  Result:  Mammo Digital Screening Bilat w/ Nico     History:  Patient is 43 y.o. and is seen for a screening mammogram.        Films Compared:  Compared to: 09/28/2022 Mammo Digital Screening Bilat w/ Nico and 03/17/2021 Mammo Digital Screening Bilat w/ Nico      Findings:  This procedure was performed using tomosynthesis.   Computer-aided detection was utilized in the interpretation of this examination.     The breasts are heterogeneously dense, which may obscure small masses. There is no evidence of suspicious masses, microcalcifications or architectural distortion.     Impression:   No mammographic evidence of malignancy.     BI-RADS Category 1: Negative     Recommendation:  Routine screening mammogram in 1 year is recommended.     Your estimated lifetime risk of breast cancer (to age 85) based on Tyrer-Cuzick risk assessment model is 21.84 %.  According to the American Cancer Society, patients with a lifetime breast cancer risk of 20% or higher might benefit from supplemental screening tests, such as screening breast MRI    ASSESSMENT/PLAN:     42 y/o female at high risk for breast cancer    PLAN:Plan     No abnormal findings on breast exam   Breast MRI pending will call patient with results.  If negative and remains with elevated tyrer cuzik score will plan for six-month follow-up with interval mammogram and breast exam    Addendum:    Result:   MRI Breast w/wo Contrast, w/CAD, Bilateral     History:  Patient is 44 y.o. and is seen for at high risk for breast cancer.        Films Compared:  Prior images (if available) were compared. Mammo 10-19-23     Technique:  A routine breast MRI was performed with a dedicated breast coil. Pre-contrast STIR were acquired. Then, pre and post contrast T1 weighted fat saturated images were acquired and subtracted with MIP reconstruction. intravenous gadolinium contrast was administered. The study was reviewed with [employer]  software.     Findings:  The breasts have heterogeneous fibroglandular tissue. The background parenchymal enhancement is minimal.     No significant masses, enhancements, or other abnormal findings are seen.     Impression:  No significant masses, enhancements, or other abnormal findings are seen.     BI-RADS Category:   Overall: 1 - Negative     Recommendation:  Return to annual screening mammogram schedule is recommended     Your estimated lifetime risk of breast cancer (to age 85) based on Tyrer-Cuzick risk assessment model is Tyrer-Cuzick: 21.84%. According to the American Cancer Society, patients with a lifetime breast cancer risk of 20% or higher might benefit from supplemental screening tests.    Follow up in 6 months for high-risk breast cancer screening      Previously high-risk breast cancer screenin. Elevated risk for breast cancer calculated at the time of her recent mammogram- mammogram wnl  2. Negative clinical examination  3. Explained that her family history of breast cancer and OB history elevates her risk. Pt denies any knowledge of genetic testing.   4. Encourage 30 min of exercise daily  5. Discussed increase risk of breast cancer and recommendations for additional screening as a result- MRI of the breasts in 6 months with breast examination- risks may decrease in the future and she would fall out of the breast cancer high risk clinic as a result. Will plan to do  MRI in 6 months and see her a week later for results and exam.   6. Proper BSE technique explained and demonstrated- pt verbalized understanding- pt is to call if notes any changes. Contact information given     30 minutes of total time spent on the encounter, which includes face to face time and non-face to face time preparing to see the patient (eg, review of tests), Obtaining and/or reviewing separately obtained history, Documenting clinical information in the electronic or other health record, Independently interpreting results (not separately reported) and communicating results to the patient/family/caregiver, or Care coordination (not separately reported).

## 2024-06-28 ENCOUNTER — OFFICE VISIT (OUTPATIENT)
Dept: FAMILY MEDICINE | Facility: CLINIC | Age: 44
End: 2024-06-28
Payer: COMMERCIAL

## 2024-06-28 VITALS
TEMPERATURE: 97 F | SYSTOLIC BLOOD PRESSURE: 124 MMHG | BODY MASS INDEX: 27.61 KG/M2 | DIASTOLIC BLOOD PRESSURE: 76 MMHG | WEIGHT: 175.94 LBS | HEIGHT: 67 IN

## 2024-06-28 DIAGNOSIS — Z12.11 COLON CANCER SCREENING: ICD-10-CM

## 2024-06-28 DIAGNOSIS — B00.9 HERPES SIMPLEX TYPE 2 INFECTION: ICD-10-CM

## 2024-06-28 DIAGNOSIS — F41.1 GAD (GENERALIZED ANXIETY DISORDER): Chronic | ICD-10-CM

## 2024-06-28 DIAGNOSIS — M54.41 CHRONIC BILATERAL LOW BACK PAIN WITH RIGHT-SIDED SCIATICA: ICD-10-CM

## 2024-06-28 DIAGNOSIS — Z00.00 PREVENTATIVE HEALTH CARE: Primary | ICD-10-CM

## 2024-06-28 DIAGNOSIS — G89.29 CHRONIC BILATERAL LOW BACK PAIN WITH RIGHT-SIDED SCIATICA: ICD-10-CM

## 2024-06-28 PROCEDURE — 99999 PR PBB SHADOW E&M-EST. PATIENT-LVL V: CPT | Mod: PBBFAC,,, | Performed by: FAMILY MEDICINE

## 2024-06-28 RX ORDER — VALACYCLOVIR HYDROCHLORIDE 500 MG/1
500 TABLET, FILM COATED ORAL DAILY
Qty: 90 TABLET | Refills: 3 | Status: SHIPPED | OUTPATIENT
Start: 2024-06-28

## 2024-06-28 NOTE — PROGRESS NOTES
CHIEF COMPLAINT:  This is a 44-year-old female here for preventive health exam.     SUBJECTIVE:  Patient is doing well without complaints. She continues to have intermittent swelling in ankles and feet. She is taking furosemide 20 mg daily as needed. Patient is status post myomectomy in May 2024. She has a history of anemia. She occasionally uses alprazolam for anxiety.  She takes zolpidem 5 mg for sleeplessness.  She takes pseudoephedrine and Zyrtec for allergic rhinitis.  The patient often has numbness and discoloration of finger tips especially when exposed to cold. Patient has positive serology for herpes simplex 2.  She  reports no past history of herpetic outbreaks.  Patient sees pain management for bulging discs and chronic back pain for which she takes opioids as needed. She had myomectomy in October 2021 and has regular menses which are not as heavy and painful as previous.      Eye exam 2024. Pap smear March 2021 due again in March 2026. Mammogram September 2023. MRI breast June 2024. Tdap September 2012. Flu vaccine October 2023. COVID 19 vaccine March, April, October 2021, October 2022.     ROS:  GENERAL: Patient denies fever, chills, night sweats. Patient denies weight gain or loss. Patient denies anorexia, fatigue, weakness or swollen glands.  SKIN: Patient denies rash or hair loss.  HEENT: Patient denies sore throat, ear pain, hearing loss, nasal congestion, or runny nose. Patient denies visual disturbance, eye irritation or discharge.  LUNGS: Patient denies cough, wheeze or hemoptysis.  CARDIOVASCULAR: Patient denies chest pain, shortness of breath, palpitations, syncope or lower extremity edema.  GI: Patient denies abdominal pain, nausea, vomiting, diarrhea, constipation, blood in stool or melena.  GENITOURINARY: Patient denies pelvic pain, vaginal discharge, itch or odor. Patient denies irregular vaginal bleeding. Patient denies dysuria, frequency, hematuria, nocturia, urgency or  incontinence.  BREASTS: Patient denies breast pain, mass or nipple discharge.  MUSCULOSKELETAL: Patient denies joint pain, swelling, redness or warmth.  NEUROLOGIC: Patient denies headache, vertigo, paresthesias, weakness in limb, dysarthria, dysphagia or abnormality of gait.  PSYCHIATRIC: Patient denies anxiety, depression, or memory loss. Positive for insomnia.     OBJECTIVE:   GENERAL: Well-developed well-nourished black female alert and oriented x3, in no acute distress. Memory, judgment and cognition without deficit.   SKIN: Clear without rash. Normal color and tone.  HEENT: Eyes: Clear conjunctivae. Pupils equal reactive to light and accommodation. Ears: Clear TMs. Clear canals. Nose: Without congestion. Pharynx: Without injection or exudates.  NECK: Supple, normal range of motion. No masses, lymphadenopathy or enlarged thyroid. No JVD. Carotids 2+ and equal. No bruits.  LUNGS: Clear to auscultation. Normal respiratory effort.  CARDIOVASCULAR: Regular rhythm, normal S1, S2 without murmur, gallop or rub.  BACK: No CVA or spinal tenderness.  BREASTS: No masses, tenderness or nipple discharge.  ABDOMEN: Normal appearance. Active bowel sounds. Soft, nontender without mass or organomegaly. No rebound or guarding.  EXTREMITIES: Without cyanosis, clubbing or edema. Distal pulses 2+ and equal. Normal range of motion in all extremities. No joint effusion, erythema or warmth.  NEUROLOGIC: Cranial nerves II through XII without deficit. Motor strength equal bilaterally. Sensation normal to touch. Deep tendon reflexes 2+ and equal. Gait without abnormality. No tremor. Negative cerebellar signs.   PELVIC: External: Without lesions or inflammation.  Vaginal: Clear.  Cervix:  Normal appearance.  No motion tenderness.  No Pap  Uterus: Enlarged 6-8 week size and retroflexed.  Adnexa: Non-tender, without masses.  Rectovaginal:  Confirms.  Heme-negative stool x2.    ASSESSMENT:  1. Preventative health care    2. MAYE (generalized  anxiety disorder)    3. Chronic bilateral low back pain with right-sided sciatica    4. Herpes simplex type 2 infection    5. Colon cancer screening      PLAN:  1. Weight reduction. Exercise regularly.  2. Age-appropriate counseling.  3. Recent lab reviewed and acceptable.   4. Colonoscopy.  5. Refill Valtrex.  6. Return to clinic annually.    This note is generated with speech recognition software and is subject to transcription error and sound alike phrases that may be missed by proofreading.

## 2024-07-09 ENCOUNTER — HOSPITAL ENCOUNTER (OUTPATIENT)
Dept: PREADMISSION TESTING | Facility: HOSPITAL | Age: 44
Discharge: HOME | End: 2024-07-09
Attending: INTERNAL MEDICINE
Payer: COMMERCIAL

## 2024-07-09 DIAGNOSIS — Z12.11 COLON CANCER SCREENING: Primary | ICD-10-CM

## 2024-07-09 RX ORDER — SODIUM, POTASSIUM,MAG SULFATES 17.5-3.13G
1 SOLUTION, RECONSTITUTED, ORAL ORAL DAILY
Qty: 1 KIT | Refills: 0 | Status: SHIPPED | OUTPATIENT
Start: 2024-07-09 | End: 2024-07-12

## 2024-07-10 ENCOUNTER — PATIENT MESSAGE (OUTPATIENT)
Dept: PREADMISSION TESTING | Facility: HOSPITAL | Age: 44
End: 2024-07-10
Payer: COMMERCIAL

## 2024-07-11 ENCOUNTER — ANESTHESIA EVENT (OUTPATIENT)
Dept: ENDOSCOPY | Facility: HOSPITAL | Age: 44
End: 2024-07-11
Payer: COMMERCIAL

## 2024-07-11 NOTE — ANESTHESIA PREPROCEDURE EVALUATION
07/11/2024  Shasta Rivera is a 44 y.o., female.  Past Medical History:   Diagnosis Date    Abnormal cervical Pap smear with positive HPV DNA test     Allergic rhinitis     Anemia     Herpes simplex type 2 infection     Onychomycosis     SAMIRA on CPAP     DOES NOT USE    Raynaud's phenomenon     Uterine fibroid      Past Medical History:   Diagnosis Date    Abnormal cervical Pap smear with positive HPV DNA test     Allergic rhinitis     Anemia     Herpes simplex type 2 infection     Onychomycosis     SAMIRA on CPAP     DOES NOT USE    Raynaud's phenomenon     Uterine fibroid    No current facility-administered medications for this encounter.    Current Outpatient Medications:     12 HOUR DECONGESTANT 120 mg 12 hr tablet, TAKE 1 TABLET BY MOUTH TWICE A DAY, Disp: 60 tablet, Rfl: 0    ALPRAZolam (XANAX) 0.25 MG tablet, TAKE 1-2 TABLETS BY MOUTH 3 TIMES DAILY AS NEEDED FOR ANXIETY., Disp: 30 tablet, Rfl: 3    buPROPion (WELLBUTRIN) 100 MG tablet, Take 1 tablet (100 mg total) by mouth once daily., Disp: 30 tablet, Rfl: 5    clobetasoL (CLOBEX) 0.05 % shampoo, Apply topically., Disp: , Rfl:     docusate sodium (COLACE) 100 MG capsule, Take 1 capsule (100 mg total) by mouth 2 (two) times daily., Disp: 30 capsule, Rfl: 0    fluticasone propionate (FLONASE) 50 mcg/actuation nasal spray, USE 2 SPRAYS IN EACH NOSTRIL DAILY AT 6 AM, Disp: 16 mL, Rfl: 3    furosemide (LASIX) 20 MG tablet, Take 1 tablet (20 mg total) by mouth daily as needed (leg swelling)., Disp: 20 tablet, Rfl: 0    HYDROcodone-acetaminophen (NORCO)  mg per tablet, Take 1 tablet by mouth 4 (four) times daily as needed., Disp: , Rfl:     ibuprofen (ADVIL,MOTRIN) 800 MG tablet, Take 1 tablet (800 mg total) by mouth every 8 (eight) hours., Disp: 30 tablet, Rfl: 0    levocetirizine (XYZAL) 5 MG tablet, Take 1 tablet (5 mg total) by mouth every  evening., Disp: 90 tablet, Rfl: 1    metoprolol succinate (TOPROL-XL) 25 MG 24 hr tablet, TAKE 1 TABLET BY MOUTH DAILY AS NEEDED., Disp: 90 tablet, Rfl: 1    multivitamin capsule, Take 1 capsule by mouth once daily., Disp: , Rfl:     polyethylene glycol (GLYCOLAX) 17 gram PwPk, Take 17 g by mouth daily as needed., Disp: , Rfl: 0    RETIN-A MICRO PUMP 0.06 % GlwP, Apply 1 application topically nightly., Disp: , Rfl:     sodium,potassium,mag sulfates (SUPREP BOWEL PREP KIT) 17.5-3.13-1.6 gram SolR, Take 177 mLs by mouth once daily. for 2 days, Disp: 1 kit, Rfl: 0    valACYclovir (VALTREX) 500 MG tablet, Take 1 tablet (500 mg total) by mouth once daily., Disp: 90 tablet, Rfl: 3    zolpidem (AMBIEN) 5 MG Tab, TAKE 1 TABLET BY MOUTH EVERY DAY AT NIGHT AS NEEDED FOR SLEEP, Disp: 30 tablet, Rfl: 1         Pre-op Assessment    I have reviewed the Patient Summary Reports.    I have reviewed the NPO Status.   I have reviewed the Medications.     Review of Systems  Anesthesia Hx:  No problems with previous Anesthesia   Neg history of prior surgery.          Denies Family Hx of Anesthesia complications.    Denies Personal Hx of Anesthesia complications.                    Social:  Non-Smoker, Social Alcohol Use       Hematology/Oncology:       -- Anemia:                                  Pulmonary:        Sleep Apnea                Neurological:    Neuromuscular Disease,                                   Psych:  Psychiatric History                  Physical Exam  General: Well nourished, Alert and Oriented    Airway:  Mallampati: I / I  Mouth Opening: Normal  TM Distance: Normal  Tongue: Normal  Neck ROM: Normal ROM    Dental:  Intact    Chest/Lungs:  Clear to auscultation, Normal Respiratory Rate    Heart:  Rate: Normal  Rhythm: Regular Rhythm        Anesthesia Plan  Type of Anesthesia, risks & benefits discussed:    Anesthesia Type: Gen Natural Airway  Intra-op Monitoring Plan: Standard ASA Monitors  Post Op Pain Control Plan:  multimodal analgesia  Induction:  IV  Informed Consent: Informed consent signed with the Patient and all parties understand the risks and agree with anesthesia plan.  All questions answered.   ASA Score: 2  Day of Surgery Review of History & Physical: H&P Update referred to the surgeon/provider.    Ready For Surgery From Anesthesia Perspective.     .

## 2024-07-12 ENCOUNTER — HOSPITAL ENCOUNTER (OUTPATIENT)
Facility: HOSPITAL | Age: 44
Discharge: HOME | End: 2024-07-12
Attending: INTERNAL MEDICINE | Admitting: INTERNAL MEDICINE
Payer: COMMERCIAL

## 2024-07-12 ENCOUNTER — ANESTHESIA (OUTPATIENT)
Dept: ENDOSCOPY | Facility: HOSPITAL | Age: 44
End: 2024-07-12
Payer: COMMERCIAL

## 2024-07-12 DIAGNOSIS — Z12.11 COLON CANCER SCREENING: Primary | ICD-10-CM

## 2024-07-12 LAB
B-HCG UR QL: NEGATIVE
CTP QC/QA: YES

## 2024-07-12 PROCEDURE — G0121 COLON CA SCRN NOT HI RSK IND: HCPCS | Performed by: INTERNAL MEDICINE

## 2024-07-12 PROCEDURE — 37000009 HC ANESTHESIA EA ADD 15 MINS: Performed by: INTERNAL MEDICINE

## 2024-07-12 PROCEDURE — G0121 COLON CA SCRN NOT HI RSK IND: HCPCS | Mod: ,,, | Performed by: INTERNAL MEDICINE

## 2024-07-12 PROCEDURE — 25000003 PHARM REV CODE 250: Performed by: INTERNAL MEDICINE

## 2024-07-12 PROCEDURE — 63600175 PHARM REV CODE 636 W HCPCS: Performed by: NURSE ANESTHETIST, CERTIFIED REGISTERED

## 2024-07-12 PROCEDURE — 81025 URINE PREGNANCY TEST: CPT | Performed by: INTERNAL MEDICINE

## 2024-07-12 PROCEDURE — 25000003 PHARM REV CODE 250: Performed by: NURSE ANESTHETIST, CERTIFIED REGISTERED

## 2024-07-12 PROCEDURE — 37000008 HC ANESTHESIA 1ST 15 MINUTES: Performed by: INTERNAL MEDICINE

## 2024-07-12 RX ORDER — PROPOFOL 10 MG/ML
VIAL (ML) INTRAVENOUS
Status: DISCONTINUED | OUTPATIENT
Start: 2024-07-12 | End: 2024-07-12

## 2024-07-12 RX ORDER — LIDOCAINE HYDROCHLORIDE 20 MG/ML
INJECTION INTRAVENOUS
Status: DISCONTINUED | OUTPATIENT
Start: 2024-07-12 | End: 2024-07-12

## 2024-07-12 RX ORDER — DEXTROMETHORPHAN/PSEUDOEPHED 2.5-7.5/.8
DROPS ORAL
Status: COMPLETED | OUTPATIENT
Start: 2024-07-12 | End: 2024-07-12

## 2024-07-12 RX ORDER — SODIUM CHLORIDE, SODIUM LACTATE, POTASSIUM CHLORIDE, CALCIUM CHLORIDE 600; 310; 30; 20 MG/100ML; MG/100ML; MG/100ML; MG/100ML
INJECTION, SOLUTION INTRAVENOUS CONTINUOUS PRN
Status: DISCONTINUED | OUTPATIENT
Start: 2024-07-12 | End: 2024-07-12

## 2024-07-12 RX ADMIN — PROPOFOL 50 MG: 10 INJECTION, EMULSION INTRAVENOUS at 11:07

## 2024-07-12 RX ADMIN — PROPOFOL 25 MG: 10 INJECTION, EMULSION INTRAVENOUS at 11:07

## 2024-07-12 RX ADMIN — LIDOCAINE HYDROCHLORIDE 50 MG: 20 INJECTION INTRAVENOUS at 11:07

## 2024-07-12 RX ADMIN — SODIUM CHLORIDE, SODIUM LACTATE, POTASSIUM CHLORIDE, AND CALCIUM CHLORIDE: 600; 310; 30; 20 INJECTION, SOLUTION INTRAVENOUS at 11:07

## 2024-07-12 NOTE — H&P
PRE PROCEDURE H&P    Patient Name: Shasta Rivera  MRN: [id number]  : 1980  Date of Procedure:  2024  Referring Physician: Mary Redmond MD  Primary Physician: Mary Redmond MD  Procedure Physician: Juliann Beltre MD       Planned Procedure: Colonoscopy  Diagnosis: screening for colon cancer  Chief Complaint: Same as above    HPI: Patient is an 44 y.o. female is here for the above.     Last colonoscopy: Index colonoscopy   Family history: None   Anticoagulation: None     Past Medical History:   Past Medical History:   Diagnosis Date    Abnormal cervical Pap smear with positive HPV DNA test     Allergic rhinitis     Anemia     Herpes simplex type 2 infection     Onychomycosis     SAMIRA on CPAP     DOES NOT USE    Raynaud's phenomenon     Uterine fibroid         Past Surgical History:  Past Surgical History:   Procedure Laterality Date    DILATION AND CURETTAGE OF UTERUS      for EAB    LUMBAR EPIDURAL INJECTION      MYOMECTOMY N/A 10/11/2021    Procedure: MYOMECTOMY;  Surgeon: Carol Ann Casarez MD;  Location: [facility];  Service: OB/GYN;  Laterality: N/A;        Home Medications:  Prior to Admission medications    Medication Sig Start Date End Date Taking? Authorizing Provider   ALPRAZolam (XANAX) 0.25 MG tablet TAKE 1-2 TABLETS BY MOUTH 3 TIMES DAILY AS NEEDED FOR ANXIETY. 8/10/23  Yes Mary Redmond MD   buPROPion (WELLBUTRIN) 100 MG tablet Take 1 tablet (100 mg total) by mouth once daily. 23  Yes Mary Redmond MD   metoprolol succinate (TOPROL-XL) 25 MG 24 hr tablet TAKE 1 TABLET BY MOUTH DAILY AS NEEDED. 3/18/24  Yes Gallo Bonilla NP   sodium,potassium,mag sulfates (SUPREP BOWEL PREP KIT) 17.5-3.13-1.6 gram SolR Take 177 mLs by mouth once daily. for 2 days 24 Yes Dav Nathan PA-C   12 HOUR DECONGESTANT 120 mg 12 hr tablet TAKE 1 TABLET BY MOUTH TWICE A DAY 24   Mary Redmond MD   clobetasoL (CLOBEX) 0.05 % shampoo Apply topically. 22    Provider, Historical   docusate sodium (COLACE) 100 MG capsule Take 1 capsule (100 mg total) by mouth 2 (two) times daily. 10/13/21   Chelsie Gruber PA-C   fluticasone propionate (FLONASE) 50 mcg/actuation nasal spray USE 2 SPRAYS IN EACH NOSTRIL DAILY AT 6 AM 8/9/23   Mary Redmond MD   furosemide (LASIX) 20 MG tablet Take 1 tablet (20 mg total) by mouth daily as needed (leg swelling). 5/24/24 5/24/25  Mary Redmond MD   HYDROcodone-acetaminophen (NORCO)  mg per tablet Take 1 tablet by mouth 4 (four) times daily as needed. 2/23/22   Provider, Historical   ibuprofen (ADVIL,MOTRIN) 800 MG tablet Take 1 tablet (800 mg total) by mouth every 8 (eight) hours. 7/11/23   Gabriela Huitron, NP   levocetirizine (XYZAL) 5 MG tablet Take 1 tablet (5 mg total) by mouth every evening. 9/20/22 2/16/24  Betsy Luciano NP   multivitamin capsule Take 1 capsule by mouth once daily.    Provider, Historical   polyethylene glycol (GLYCOLAX) 17 gram PwPk Take 17 g by mouth daily as needed. 10/13/21   Chelsie Gruber PA-C   RETIN-A MICRO PUMP 0.06 % GlwP Apply 1 application topically nightly. 6/21/22   Provider, Historical   valACYclovir (VALTREX) 500 MG tablet Take 1 tablet (500 mg total) by mouth once daily. 6/28/24   Mary Redmond MD   zolpidem (AMBIEN) 5 MG Tab TAKE 1 TABLET BY MOUTH EVERY DAY AT NIGHT AS NEEDED FOR SLEEP 6/17/24   Mary Redmond MD        Allergies:  Review of patient's allergies indicates:  No Known Allergies     Social History:   Social History     Socioeconomic History    Marital status:    Occupational History     Employer: [employer]   Tobacco Use    Smoking status: Never    Smokeless tobacco: Never   Substance and Sexual Activity    Alcohol use: Yes     Alcohol/week: 7.0 standard drinks of alcohol     Types: 7 Glasses of wine per week    Drug use: No    Sexual activity: Yes     Partners: Male   Social History Narrative    The patient is  who lives alone and  "works as a .  She has a Masters in Civil Engineering.       Family History:  Family History   Problem Relation Name Age of Onset    Diabetes Mother      Hypertension Mother      Hyperlipidemia Mother      Colon cancer Father      Hyperlipidemia Father      Prostate cancer Father      Breast cancer Maternal Grandmother      Cancer Maternal Grandmother      Colon cancer Maternal Grandmother      Stroke Maternal Grandfather      Breast cancer Paternal Grandmother      Diabetes Paternal Grandmother      Hypertension Paternal Grandmother      Uterine cancer Paternal Grandmother      Sleep apnea Brother      Breast cancer Other Great Aunt        ROS: No acute cardiac events, no acute respiratory complaints.     Physical Exam (all patients):    /73 (BP Location: Right arm, Patient Position: Sitting)   Pulse 90   Temp 97.7 °F (36.5 °C) (Temporal)   Resp 18   Ht 5' 7" (1.702 m)   Wt 77 kg (169 lb 12.1 oz)   LMP 06/19/2024   SpO2 98%   Breastfeeding No   BMI 26.59 kg/m²   Lungs: Clear to auscultation bilaterally, respirations unlabored  Heart: Regular rate and rhythm, S1 and S2 normal, no obvious murmurs  Abdomen:         Soft, non-tender, bowel sounds normal, no masses, no organomegaly    Lab Results   Component Value Date    WBC 6.85 02/23/2024    MCV 92 02/23/2024    RDW 13.3 02/23/2024     02/23/2024    GLU 91 05/24/2024    HGBA1C 5.3 02/23/2024    BUN 14 05/24/2024     05/24/2024    K 4.5 05/24/2024     05/24/2024        SEDATION PLAN: per anesthesia      History reviewed, vital signs satisfactory, cardiopulmonary status satisfactory, sedation options, risks and plans have been discussed with the patient  All their questions were answered and the patient agrees to the sedation procedures as planned and the patient is deemed an appropriate candidate for the sedation as planned.    Procedure explained to patient, informed consent obtained and placed in chart.    Juliann BOLDEN" Alexsander  7/12/2024  10:59 AM

## 2024-07-12 NOTE — TRANSFER OF CARE
"Anesthesia Transfer of Care Note    Patient: Shasta Rivera    Procedure(s) Performed: Procedure(s) (LRB):  COLONOSCOPY (N/A)    Patient location: PACU    Anesthesia Type: general    Transport from OR: Transported from OR on room air with adequate spontaneous ventilation    Post pain: adequate analgesia    Post assessment: no apparent anesthetic complications    Post vital signs: stable    Level of consciousness: awake    Nausea/Vomiting: no nausea/vomiting    Complications: none    Transfer of care protocol was followed      Last vitals: Visit Vitals  /73 (BP Location: Right arm, Patient Position: Sitting)   Pulse 90   Temp 36.5 °C (97.7 °F) (Temporal)   Resp 18   Ht 5' 7" (1.702 m)   Wt 77 kg (169 lb 12.1 oz)   LMP 06/19/2024   SpO2 98%   Breastfeeding No   BMI 26.59 kg/m²     "

## 2024-07-12 NOTE — PLAN OF CARE
Discharge instructions reviewed with pt, handouts given, verbalized understanding with no further questions at this time. Dr. Beltre spoke to pt at bedside, reviewed procedure and answered questions. VSS on RA, no pain or nausea noted, tolerating po fluids without difficulty, no other complaints noted. Fall precautions reviewed, consents in chart.

## 2024-07-12 NOTE — ANESTHESIA POSTPROCEDURE EVALUATION
Anesthesia Post Evaluation    Patient: Shasta Rivera    Procedure(s) Performed: Procedure(s) (LRB):  COLONOSCOPY (N/A)    Final Anesthesia Type: general      Patient location during evaluation: PACU  Patient participation: Yes- Able to Participate  Level of consciousness: awake and alert and oriented  Post-procedure vital signs: reviewed and stable  Pain management: adequate  Airway patency: patent    PONV status at discharge: No PONV  Anesthetic complications: no      Cardiovascular status: blood pressure returned to baseline, stable and hemodynamically stable  Respiratory status: unassisted  Hydration status: euvolemic  Follow-up not needed.              Vitals Value Taken Time   /76 07/12/24 1139     07/12/24 1141   Pulse 74 07/12/24 1140   Resp 29 07/12/24 1140   SpO2 94 % 07/12/24 1131   Vitals shown include unfiled device data.      No case tracking events are documented in the log.      Pain/Kyra Score: Kyra Score: 10 (7/12/2024 11:25 AM)

## 2024-07-12 NOTE — PROVATION PATIENT INSTRUCTIONS
Discharge Summary/Instructions after an Endoscopic Procedure  Patient Name: Shasta Rivera  Patient MRN: [id number]  Patient YOB: 1980  Friday, July 12, 2024  Juliann Beltre MD  Dear patient,  As a result of recent federal legislation (The Federal Cures Act), you may   receive lab or pathology results from your procedure in your [facility]   account before your physician is able to contact you. Your physician or   their representative will relay the results to you with their   recommendations at their soonest availability.  Thank you,  RESTRICTIONS:  During your procedure today, you received medications for sedation.  These   medications may affect your judgment, balance and coordination.  Therefore,   for 24 hours, you have the following restrictions:   - DO NOT drive a car, operate machinery, make legal/financial decisions,   sign important papers or drink alcohol.    ACTIVITY:  Today: no heavy lifting, straining or running due to procedural   sedation/anesthesia.  The following day: return to full activity including work.  DIET:  Eat and drink normally unless instructed otherwise.     TREATMENT FOR COMMON SIDE EFFECTS:  - Mild abdominal pain, nausea, belching, bloating or excessive gas:  rest,   eat lightly and use a heating pad.  - Sore Throat: treat with throat lozenges and/or gargle with warm salt   water.  - Because air was used during the procedure, expelling large amounts of air   from your rectum or belching is normal.  - If a bowel prep was taken, you may not have a bowel movement for 1-3 days.    This is normal.  SYMPTOMS TO WATCH FOR AND REPORT TO YOUR PHYSICIAN:  1. Abdominal pain or bloating, other than gas cramps.  2. Chest pain.  3. Back pain.  4. Signs of infection such as: chills or fever occurring within 24 hours   after the procedure.  5. Rectal bleeding, which would show as bright red, maroon, or black stools.   (A tablespoon of blood from the rectum is not serious, especially if    hemorrhoids are present.)  6. Vomiting.  7. Weakness or dizziness.  GO DIRECTLY TO THE NEAREST EMERGENCY ROOM IF YOU HAVE ANY OF THE FOLLOWING:      Difficulty breathing              Chills and/or fever over 101 F   Persistent vomiting and/or vomiting blood   Severe abdominal pain   Severe chest pain   Black, tarry stools   Bleeding- more than one tablespoon   Any other symptom or condition that you feel may need urgent attention  Your doctor recommends these additional instructions:  If any biopsies were taken, your doctors clinic will contact you in 1 to 2   weeks with any results.  - Discharge patient to home.   - Resume previous diet.   - Continue present medications.   - Await pathology results.   - Repeat colonoscopy in 10 years for screening purposes.   - Return to referring physician.   - Patient has a contact number available for emergencies.  The signs and   symptoms of potential delayed complications were discussed with the   patient.  Return to normal activities tomorrow.  Written discharge   instructions were provided to the patient.  For questions, problems or results please call your physician Juliann Beltre MD at Work:  [phone]  If you have any questions about the above instructions, call the GI   department at [phone] or call the endoscopy unit at [phone]   from 7am until 3 pm.  [facility], EMERGENCY ROOM PHONE NUMBER:   [phone]  IF A COMPLICATION OR EMERGENCY SITUATION ARISES AND YOU ARE UNABLE TO REACH   YOUR PHYSICIAN - GO DIRECTLY TO THE EMERGENCY ROOM.  I have read or have had read to me these discharge instructions for my   procedure and have received a written copy.  I understand these   instructions and will follow-up with my physician if I have any questions.     __________________________________       _____________________________________  Nurse Signature                                          Patient/Designated   Responsible  Party Signature  Juliann Beltre MD  7/12/2024 11:24:24 AM  This report has been verified and signed electronically.  Dear patient,  As a result of recent federal legislation (The Federal Cures Act), you may   receive lab or pathology results from your procedure in your [facility]   account before your physician is able to contact you. Your physician or   their representative will relay the results to you with their   recommendations at their soonest availability.  Thank you,  PROVATION

## 2024-07-15 VITALS
HEART RATE: 89 BPM | HEIGHT: 67 IN | SYSTOLIC BLOOD PRESSURE: 130 MMHG | WEIGHT: 169.75 LBS | RESPIRATION RATE: 18 BRPM | OXYGEN SATURATION: 100 % | TEMPERATURE: 98 F | DIASTOLIC BLOOD PRESSURE: 73 MMHG | BODY MASS INDEX: 26.64 KG/M2

## 2024-07-17 ENCOUNTER — PATIENT MESSAGE (OUTPATIENT)
Dept: FAMILY MEDICINE | Facility: CLINIC | Age: 44
End: 2024-07-17
Payer: COMMERCIAL

## 2024-07-22 RX ORDER — PSEUDOEPHEDRINE HCL 120 MG
120 TABLET, EXTENDED RELEASE ORAL 2 TIMES DAILY
Qty: 60 TABLET | Refills: 5 | Status: SHIPPED | OUTPATIENT
Start: 2024-07-22

## 2024-07-22 NOTE — TELEPHONE ENCOUNTER
No care due was identified.  [facility] Embedded Care Due Messages. Reference number: [id number].   7/21/2024 9:25:56 PM CDT   Griseofulvin Pregnancy And Lactation Text: This medication is Pregnancy Category X and is known to cause serious birth defects. It is unknown if this medication is excreted in breast milk but breast feeding should be avoided.

## 2024-07-28 NOTE — TELEPHONE ENCOUNTER
No care due was identified.  [facility] Embedded Care Due Messages. Reference number: [id number].   7/27/2024 11:18:01 PM CDT

## 2024-07-29 RX ORDER — FUROSEMIDE 20 MG/1
TABLET ORAL
Qty: 20 TABLET | Refills: 0 | Status: SHIPPED | OUTPATIENT
Start: 2024-07-29

## 2024-07-29 NOTE — TELEPHONE ENCOUNTER
Refill Routing Note   Medication(s) are not appropriate for processing by [facility] for the following reason(s):        Outside of protocol: PRN diuretic use oop    [facility] action(s):  Route             Appointments  past 12m or future 3m with PCP    Date Provider   Last Visit   6/28/2024 Mary Redmond MD   Next Visit   Visit date not found Mary Redmond MD   ED visits in past 90 days: 0        Note composed:5:09 PM 07/29/2024

## 2024-07-30 RX ORDER — ALPRAZOLAM 0.25 MG/1
TABLET ORAL
Qty: 30 TABLET | Refills: 3 | Status: SHIPPED | OUTPATIENT
Start: 2024-07-30

## 2024-07-30 NOTE — TELEPHONE ENCOUNTER
No care due was identified.  [facility] Embedded Care Due Messages. Reference number: [id number].   7/29/2024 9:40:14 PM CDT

## 2024-08-26 RX ORDER — ZOLPIDEM TARTRATE 5 MG/1
TABLET ORAL
Qty: 30 TABLET | Refills: 5 | Status: SHIPPED | OUTPATIENT
Start: 2024-08-26

## 2024-08-26 NOTE — TELEPHONE ENCOUNTER
No care due was identified.  [facility] Embedded Care Due Messages. Reference number: [id number].   8/26/2024 6:28:17 AM CDT

## 2024-10-01 ENCOUNTER — PATIENT MESSAGE (OUTPATIENT)
Dept: OBSTETRICS AND GYNECOLOGY | Facility: CLINIC | Age: 44
End: 2024-10-01
Payer: COMMERCIAL

## 2024-10-08 ENCOUNTER — OFFICE VISIT (OUTPATIENT)
Dept: OTOLARYNGOLOGY | Facility: CLINIC | Age: 44
End: 2024-10-08
Payer: COMMERCIAL

## 2024-10-08 VITALS — HEIGHT: 67 IN | WEIGHT: 169.75 LBS | BODY MASS INDEX: 26.64 KG/M2

## 2024-10-08 DIAGNOSIS — H65.01 NON-RECURRENT ACUTE SEROUS OTITIS MEDIA OF RIGHT EAR: ICD-10-CM

## 2024-10-08 DIAGNOSIS — J01.41 ACUTE RECURRENT PANSINUSITIS: Primary | ICD-10-CM

## 2024-10-08 DIAGNOSIS — J30.89 NON-SEASONAL ALLERGIC RHINITIS, UNSPECIFIED TRIGGER: ICD-10-CM

## 2024-10-08 PROCEDURE — 3008F BODY MASS INDEX DOCD: CPT | Mod: CPTII,S$GLB,, | Performed by: OTOLARYNGOLOGY

## 2024-10-08 PROCEDURE — 99214 OFFICE O/P EST MOD 30 MIN: CPT | Mod: S$GLB,,, | Performed by: OTOLARYNGOLOGY

## 2024-10-08 PROCEDURE — 3044F HG A1C LEVEL LT 7.0%: CPT | Mod: CPTII,S$GLB,, | Performed by: OTOLARYNGOLOGY

## 2024-10-08 PROCEDURE — 99999 PR PBB SHADOW E&M-EST. PATIENT-LVL II: CPT | Mod: PBBFAC,,, | Performed by: OTOLARYNGOLOGY

## 2024-10-08 RX ORDER — METHYLPREDNISOLONE 4 MG/1
TABLET ORAL
Qty: 1 EACH | Refills: 0 | Status: SHIPPED | OUTPATIENT
Start: 2024-10-08

## 2024-10-08 RX ORDER — AMOXICILLIN AND CLAVULANATE POTASSIUM 875; 125 MG/1; MG/1
1 TABLET, FILM COATED ORAL 2 TIMES DAILY
Qty: 20 TABLET | Refills: 0 | Status: SHIPPED | OUTPATIENT
Start: 2024-10-08 | End: 2024-10-18

## 2024-10-08 RX ORDER — BENZONATATE 200 MG/1
200 CAPSULE ORAL 3 TIMES DAILY PRN
COMMUNITY

## 2024-10-08 NOTE — PROGRESS NOTES
"Referring Provider:    No referring provider defined for this encounter.  Subjective:   Patient: Shasta Rivera [id number], :1980   Visit date:10/8/2024 9:51 AM    Chief Complaint:  Ear Problem (Pt has ear fullness in the right ear since wed of last week the patient states she she thinks she is having a sinus infection /Pt is using Flonase and xyzal )    HPI:    Prior notes reviewed by myself.  Clinical documentation obtained by nursing staff reviewed.      44-year-old female presents for evaluation sinus congestion, rhinorrhea, postnasal drip, muffled hearing and otalgia on the right side.  This has been going on for the last 7-10 days.  She was treated with a IM steroid injection as well as multiple other meds for her cough and cold symptoms.  She was not treated with antibiotics.  She recently went on a flight out of town which she thinks may have exacerbated her symptoms.  She does admit to some discolored nasal drainage.        Objective:     Physical Exam:  Vitals:  Ht 5' 7" (1.702 m)   Wt 77 kg (169 lb 12.1 oz)   BMI 26.59 kg/m²   General appearance:  Well developed, well nourished    Ears:  Otoscopy of external auditory canals and tympanic membranes was normal left and erythematous TM right with obvious middle ear effusion, clinical speech reception thresholds grossly intact, no mass/lesion of auricle.    Nose:  No masses/lesions of external nose, congested nasal mucosa, septum, and turbinates were within normal limits.    Mouth:  No mass/lesion of lips, teeth, gums, hard/soft palate, tongue, tonsils, or oropharynx.    Neck & Lymphatics:  No cervical lymphadenopathy, no neck mass/crepitus/ asymmetry, trachea is midline, no thyroid enlargement/tenderness/mass.        [x]  Data Reviewed:    Lab Results   Component Value Date    WBC 6.85 2024    HGB 12.3 2024    HCT 37.9 2024    MCV 92 2024    EOSINOPHIL 1.3 2024                 Assessment & Plan:   Acute recurrent " pansinusitis  -     amoxicillin-clavulanate 875-125mg (AUGMENTIN) 875-125 mg per tablet; Take 1 tablet by mouth 2 (two) times daily. for 10 days  Dispense: 20 tablet; Refill: 0  -     methylPREDNISolone (MEDROL DOSEPACK) 4 mg tablet; use as directed  Dispense: 1 each; Refill: 0    Non-seasonal allergic rhinitis, unspecified trigger    Non-recurrent acute serous otitis media of right ear        She has evidence of acute sinusitis and otitis media.  We agreed to treat with my Eustachian tube dysfunction protocol, 10 days of Augmentin and oral steroids.  She will follow up as needed      Dr. Luevano's Eustachian Tube Dysfunction Protocol          Day 1-3 (Perform 2x per day)     Afrin (pump spray mist OTC) 2 sprays in each nostril (wait 10 minutes before step 2)  Fluticasone nasal spray 2 sprays in each nostril      Days 4 - follow up visit (perform 1x per day)    Fluticasone nasal spray 2 sprays in each nostril    *Buy the OTC Afrin pump spray mist - it should be in the allergy/cold/sinus section of the pharmacy  *Autoinsufflate (POP your ears gently!) at least 4 times per day until your follow up appointment    *TAKE ALL OTHER MEDICATIONS AS DIRECTED BY DR LUEVANO    *MAKE SURE YOU STOP USING AFRIN AFTER THE 3RD DAY AS THERE IS A RISK OF BECOMING DEPENDENT ON THAT MEDICATION    Prashant Luevano M.D.  Department of Otolaryngology - Head & Neck Surgery  [address] [place] [address].  [place] [address]  P: [phone]  F: [phone]        DISCLAIMER: This note was prepared with [employer] voice recognition transcription software. Garbled syntax, mangled pronouns, and other bizarre constructions may be attributed to that software system. While efforts were made to correct any mistakes made by this voice recognition program, some errors and/or omissions may remain in the note that were missed when the note was originally created.

## 2024-10-16 ENCOUNTER — PATIENT MESSAGE (OUTPATIENT)
Dept: OTOLARYNGOLOGY | Facility: CLINIC | Age: 44
End: 2024-10-16
Payer: COMMERCIAL

## 2024-10-16 DIAGNOSIS — R05.9 COUGH, UNSPECIFIED TYPE: Primary | ICD-10-CM

## 2024-10-16 RX ORDER — BENZONATATE 200 MG/1
200 CAPSULE ORAL 3 TIMES DAILY PRN
Qty: 30 CAPSULE | Refills: 0 | Status: SHIPPED | OUTPATIENT
Start: 2024-10-16

## 2024-10-21 ENCOUNTER — TELEPHONE (OUTPATIENT)
Dept: PULMONOLOGY | Facility: CLINIC | Age: 44
End: 2024-10-21
Payer: COMMERCIAL

## 2024-10-21 ENCOUNTER — PATIENT MESSAGE (OUTPATIENT)
Dept: PULMONOLOGY | Facility: CLINIC | Age: 44
End: 2024-10-21
Payer: COMMERCIAL

## 2024-10-21 NOTE — TELEPHONE ENCOUNTER
----- Message from Elizabeth Lejeune, NP sent at 10/21/2024  4:21 PM CDT -----  Reschedule appt.  Canceled appt for this week annual followup  ----- Message -----  From: Yumiko Estevez LPN  Sent: 10/21/2024   4:13 PM CDT  To: Elizabeth B Lejeune, NP    LV: 10/31/23  Please advise.  ----- Message -----  From: Lorin Kline  Sent: 10/21/2024   3:24 PM CDT  To: Lejeune Elizabeth Staff    Type:  RX Refill Request    Who Called: CY  Refill or New Rx:refill  RX Name and Strength:CPAP SUPPLIES  How is the patient currently taking it? (ex. 1XDay):  Is this a 30 day or 90 day RX:  Preferred Pharmacy with phone number:[facility]  Local or Mail Order:    Ordering Provider:LEJEUNE  Would the patient rather a call back or a response via [facility]?   Best Call Back Number: [phone]  [phone]  Additional Information:

## 2024-10-29 RX ORDER — FLUTICASONE PROPIONATE 50 MCG
SPRAY, SUSPENSION (ML) NASAL
Qty: 16 ML | Refills: 3 | Status: SHIPPED | OUTPATIENT
Start: 2024-10-29

## 2024-11-13 ENCOUNTER — OFFICE VISIT (OUTPATIENT)
Dept: SLEEP MEDICINE | Facility: CLINIC | Age: 44
End: 2024-11-13
Payer: COMMERCIAL

## 2024-11-13 VITALS — HEIGHT: 67 IN | BODY MASS INDEX: 24.64 KG/M2 | WEIGHT: 157 LBS

## 2024-11-13 DIAGNOSIS — G47.33 OSA (OBSTRUCTIVE SLEEP APNEA): Primary | ICD-10-CM

## 2024-11-13 DIAGNOSIS — F51.01 PRIMARY INSOMNIA: ICD-10-CM

## 2024-11-13 PROCEDURE — 1159F MED LIST DOCD IN RCRD: CPT | Mod: CPTII,95,, | Performed by: NURSE PRACTITIONER

## 2024-11-13 PROCEDURE — 3044F HG A1C LEVEL LT 7.0%: CPT | Mod: CPTII,95,, | Performed by: NURSE PRACTITIONER

## 2024-11-13 PROCEDURE — 3008F BODY MASS INDEX DOCD: CPT | Mod: CPTII,95,, | Performed by: NURSE PRACTITIONER

## 2024-11-13 PROCEDURE — 1160F RVW MEDS BY RX/DR IN RCRD: CPT | Mod: CPTII,95,, | Performed by: NURSE PRACTITIONER

## 2024-11-13 PROCEDURE — 99213 OFFICE O/P EST LOW 20 MIN: CPT | Mod: 95,,, | Performed by: NURSE PRACTITIONER

## 2024-11-13 NOTE — PROGRESS NOTES
"Subjective:      Patient ID: Shasta Rivera is a 44 y.o. female.    Chief Complaint: Sleep Apnea  The patient location is: [place]  The chief complaint leading to consultation is: Sleep apnea follow up.  Visit type: Virtual visit with synchronous audio and video  Total time spent with patient: 10 min   Each patient to whom he or she provides medical services by telemedicine is:  (1) informed of the relationship between the physician and patient and the respective role of any other health care provider with respect to management of the patient; and (2) notified that he or she may decline to receive medical services by telemedicine and may withdraw from such care at any time.    HPI  Presents to office for review of AutoPAP therapy. She has not worn PAP in a while. She is motivated to start back on therapy. She needs supplies. Her  says she has much better sleep when she wears PAP.   Sleeps 6 hours on average   She takes Ambien for insomnia    Patient Active Problem List   Diagnosis    Allergic rhinitis    Uterine fibroid    Primary insomnia    SAMIRA (obstructive sleep apnea)    MAYE (generalized anxiety disorder)    Menorrhagia with regular cycle    S/P myomectomy    Herpes simplex type 2 infection    Chronic bilateral low back pain with right-sided sciatica     Ht 5' 7" (1.702 m)   Wt 71.2 kg (157 lb)   BMI 24.59 kg/m²   Body mass index is 24.59 kg/m².    Review of Systems   Constitutional: Negative.    HENT: Negative.     Respiratory: Negative.     Cardiovascular: Negative.    Musculoskeletal: Negative.    Gastrointestinal: Negative.    Neurological: Negative.    Psychiatric/Behavioral: Negative.       Objective:      Physical Exam  Constitutional:       Appearance: Normal appearance. She is well-developed.   HENT:      Head: Normocephalic and atraumatic.   Pulmonary:      Effort: Pulmonary effort is normal. No tachypnea, bradypnea, accessory muscle usage or respiratory distress.   Musculoskeletal:     "  Cervical back: Normal range of motion.   Skin:     Findings: No rash.   Neurological:      Mental Status: She is alert and oriented to person, place, and time.   Psychiatric:         Behavior: Behavior normal.         Thought Content: Thought content normal.         Judgment: Judgment normal.       Personal Diagnostic Review  Compliance Report Compliance Payor Standard Usage 01/12/2024 - 02/10/2024 Usage days 8/30 days (27%) >= 4 hours 7 days (23%) < 4 hours 1 days (3%) Usage hours 52 hours 21 minutes Average usage (total days) 1 hours 45 minutes Average usage (days used) 6 hours 33 minutes Median usage (days used) 6 hours 57 minutes Total used hours (value since last reset - 02/10/2024) 771 hours AirSense 10 AutoSet Serial number [id number] Mode AutoSet Min Pressure 4 cmH2O Max Pressure 16 cmH2O EPR Fulltime EPR level 3 Response Standard Therapy Pressure - cmH2O Median: 9.2 95th percentile: 13.2 Maximum: 14.6 Leaks - L/min Median: 0.6 95th percentile: 10.5 Maximum: 114.8 Events per hour AI: 0.7 HI: 0.4 AHI: 1.1 Apnea Index Central: 0.0 Obstructive: 0.2 Unknown: 0.4 RERA Index 0.3  Assessment:       1. SAMIRA (obstructive sleep apnea)    2. Primary insomnia          Outpatient Encounter Medications as of 11/13/2024   Medication Sig Dispense Refill    12 HOUR DECONGESTANT 120 mg 12 hr tablet TAKE 1 TABLET BY MOUTH TWICE A DAY 60 tablet 5    ALPRAZolam (XANAX) 0.25 MG tablet TAKE 1-2 TABLETS BY MOUTH 3 TIMES DAILY AS NEEDED FOR ANXIETY. 30 tablet 3    benzonatate (TESSALON) 200 MG capsule Take 1 capsule (200 mg total) by mouth 3 (three) times daily as needed for Cough. 30 capsule 0    buPROPion (WELLBUTRIN) 100 MG tablet Take 1 tablet (100 mg total) by mouth once daily. 30 tablet 5    clobetasoL (CLOBEX) 0.05 % shampoo Apply topically.      docusate sodium (COLACE) 100 MG capsule Take 1 capsule (100 mg total) by mouth 2 (two) times daily. 30 capsule 0    fluticasone propionate (FLONASE) 50 mcg/actuation nasal spray USE  2 SPRAYS IN EACH NOSTRIL DAILY AT 6 AM 16 mL 3    furosemide (LASIX) 20 MG tablet TAKE 1 TABLET BY MOUTH DAILY AS NEEDED FOR LEG SWELLING 20 tablet 0    HYDROcodone-acetaminophen (NORCO)  mg per tablet Take 1 tablet by mouth 4 (four) times daily as needed.      ibuprofen (ADVIL,MOTRIN) 800 MG tablet Take 1 tablet (800 mg total) by mouth every 8 (eight) hours. 30 tablet 0    metoprolol succinate (TOPROL-XL) 25 MG 24 hr tablet TAKE 1 TABLET BY MOUTH DAILY AS NEEDED. 90 tablet 1    multivitamin capsule Take 1 capsule by mouth once daily.      polyethylene glycol (GLYCOLAX) 17 gram PwPk Take 17 g by mouth daily as needed.  0    RETIN-A MICRO PUMP 0.06 % GlwP Apply 1 application topically nightly.      valACYclovir (VALTREX) 500 MG tablet Take 1 tablet (500 mg total) by mouth once daily. 90 tablet 3    zolpidem (AMBIEN) 5 MG Tab TAKE 1 TABLET BY MOUTH EVERY DAY AT NIGHT AS NEEDED FOR SLEEP 30 tablet 5    [DISCONTINUED] methylPREDNISolone (MEDROL DOSEPACK) 4 mg tablet use as directed 1 each 0    levocetirizine (XYZAL) 5 MG tablet Take 1 tablet (5 mg total) by mouth every evening. 90 tablet 1    [DISCONTINUED] fluticasone propionate (FLONASE) 50 mcg/actuation nasal spray USE 2 SPRAYS IN EACH NOSTRIL DAILY AT 6 AM 16 mL 3     No facility-administered encounter medications on file as of 11/13/2024.     Orders Placed This Encounter   Procedures    CPAP/BIPAP SUPPLIES     Order Specific Question:   Length of need (1-99 months):     Answer:   99     Order Specific Question:   Choose ONE mask type and its corresponding cushions and/or pillows:     Answer:    Full Face Mask, 1 per 90 days:  Full Face Cushion, (3 per 90 days)     Order Specific Question:   Choose EITHER Heated or Non-Heated Tubjing     Answer:    Heated Tubing, 1 per 6 months     Order Specific Question:   All other supplies as needed as listed below:     Answer:    Headgear, 1 per 180 days     Order Specific Question:   All other  supplies as needed as listed below:     Answer:    Disposable Filter, 6 per 90 days     Order Specific Question:   All other supplies as needed as listed below:     Answer:    Non-Disposable Filter, 1 per 180 days     Order Specific Question:   All other supplies as needed as listed below:     Answer:    Humidifier Chamber, 1 per 180 days     Plan:     Problem List Items Addressed This Visit          Other    Primary insomnia    SAMIRA (obstructive sleep apnea) - Primary    Overview     Autopap         Relevant Orders    CPAP/BIPAP SUPPLIES         Ambien as needed.   Schedule 7 hr sleep  Supply order.   Wear APAP nightly over 4 hours   Follow up annually in the sleep clinic.               Elizabeth LeJeune, ACNP, ANP

## 2024-11-14 ENCOUNTER — OFFICE VISIT (OUTPATIENT)
Dept: FAMILY MEDICINE | Facility: CLINIC | Age: 44
End: 2024-11-14
Payer: COMMERCIAL

## 2024-11-14 VITALS
HEIGHT: 67 IN | DIASTOLIC BLOOD PRESSURE: 80 MMHG | WEIGHT: 156.5 LBS | HEART RATE: 79 BPM | SYSTOLIC BLOOD PRESSURE: 118 MMHG | TEMPERATURE: 99 F | BODY MASS INDEX: 24.56 KG/M2 | OXYGEN SATURATION: 97 %

## 2024-11-14 DIAGNOSIS — N97.9 FEMALE INFERTILITY: ICD-10-CM

## 2024-11-14 DIAGNOSIS — F51.01 PRIMARY INSOMNIA: ICD-10-CM

## 2024-11-14 DIAGNOSIS — F41.1 GAD (GENERALIZED ANXIETY DISORDER): Chronic | ICD-10-CM

## 2024-11-14 DIAGNOSIS — B37.31 VULVOVAGINITIS DUE TO YEAST: Primary | ICD-10-CM

## 2024-11-14 DIAGNOSIS — Z98.890 S/P MYOMECTOMY: ICD-10-CM

## 2024-11-14 LAB
BACTERIA HYPHAE, POC: NEGATIVE
GARDNERELLA VAGINALIS: NEGATIVE
OTHER MICROSC. OBSERVATIONS: ABNORMAL
POC BACTERIAL VAGINOSIS: ABNORMAL
POC CLUE CELLS: ABNORMAL
TRICHOMONAS, POC: NEGATIVE
YEAST WET PREP: POSITIVE
YEAST, POC: POSITIVE

## 2024-11-14 PROCEDURE — 99213 OFFICE O/P EST LOW 20 MIN: CPT | Mod: S$GLB,,, | Performed by: FAMILY MEDICINE

## 2024-11-14 PROCEDURE — 3079F DIAST BP 80-89 MM HG: CPT | Mod: CPTII,S$GLB,, | Performed by: FAMILY MEDICINE

## 2024-11-14 PROCEDURE — 3044F HG A1C LEVEL LT 7.0%: CPT | Mod: CPTII,S$GLB,, | Performed by: FAMILY MEDICINE

## 2024-11-14 PROCEDURE — 3008F BODY MASS INDEX DOCD: CPT | Mod: CPTII,S$GLB,, | Performed by: FAMILY MEDICINE

## 2024-11-14 PROCEDURE — 1159F MED LIST DOCD IN RCRD: CPT | Mod: CPTII,S$GLB,, | Performed by: FAMILY MEDICINE

## 2024-11-14 PROCEDURE — 1160F RVW MEDS BY RX/DR IN RCRD: CPT | Mod: CPTII,S$GLB,, | Performed by: FAMILY MEDICINE

## 2024-11-14 PROCEDURE — 99999 PR PBB SHADOW E&M-EST. PATIENT-LVL IV: CPT | Mod: PBBFAC,,, | Performed by: FAMILY MEDICINE

## 2024-11-14 PROCEDURE — 3074F SYST BP LT 130 MM HG: CPT | Mod: CPTII,S$GLB,, | Performed by: FAMILY MEDICINE

## 2024-11-14 PROCEDURE — G2211 COMPLEX E/M VISIT ADD ON: HCPCS | Mod: S$GLB,,, | Performed by: FAMILY MEDICINE

## 2024-11-14 RX ORDER — FLUCONAZOLE 200 MG/1
200 TABLET ORAL
Qty: 3 TABLET | Refills: 0 | Status: SHIPPED | OUTPATIENT
Start: 2024-11-14 | End: 2024-11-21

## 2024-11-14 NOTE — PROGRESS NOTES
CHIEF COMPLAINT:  This is a 44-year-old female complaining of vaginal itching.     SUBJECTIVE:  The patient complains of a 2 week history of vaginal itching causing incessant scratching and irritation.  She denies vaginal discharge, odor, dysuria or frequency.  Patient is undergoing IVF and was recently given 2 rounds of antibiotics due to fluid in pelvis.    The patient takes furosemide 20 mg daily as needed for lower extremity swelling.  She has a history of anemia. She occasionally uses alprazolam for anxiety.  She takes zolpidem 5 mg for sleeplessness.  She takes pseudoephedrine and Zyrtec for allergic rhinitis.  The patient often has numbness and discoloration of finger tips especially when exposed to cold. Patient has positive serology for herpes simplex 2.  She  reports no past history of herpetic outbreaks.  Patient sees pain management for bulging discs and chronic back pain for which she takes opioids as needed. She is status post myomectomy in October 2021 and May 2024.       ROS:  GENERAL: Patient denies fever, chills, night sweats. Patient denies weight gain or loss. Patient denies anorexia, fatigue, weakness or swollen glands.  SKIN: Patient denies rash or hair loss.  HEENT: Patient denies sore throat, ear pain, hearing loss, nasal congestion, or runny nose. Patient denies visual disturbance, eye irritation or discharge.  LUNGS: Patient denies cough, wheeze or hemoptysis.  CARDIOVASCULAR: Patient denies chest pain, shortness of breath, palpitations, syncope or lower extremity edema.  GI: Patient denies abdominal pain, nausea, vomiting, diarrhea, constipation, blood in stool or melena.  GENITOURINARY: Patient denies pelvic pain, vaginal discharge or odor.  Patient denies irregular vaginal bleeding. Patient denies dysuria, frequency, hematuria, nocturia, urgency or incontinence.  Positive for vaginal itching and irritation.  BREASTS: Patient denies breast pain, mass or nipple discharge.  MUSCULOSKELETAL:  Patient denies joint pain, swelling, redness or warmth.  NEUROLOGIC: Patient denies headache, vertigo, paresthesias, weakness in limb, dysarthria, dysphagia or abnormality of gait.  PSYCHIATRIC: Patient denies anxiety, depression, or memory loss. Positive for insomnia.     OBJECTIVE:   GENERAL: Well-developed well-nourished black female alert and oriented x3, in no acute distress. Memory, judgment and cognition without deficit.   SKIN: Clear without rash. Normal color and tone.  HEENT: Eyes: Clear conjunctivae.  No scleral icterus.  NECK: Supple, normal range of motion.   LUNGS:  Normal respiratory effort.  ABDOMEN:  Soft, nontender without mass or organomegaly. No rebound or guarding.  EXTREMITIES: Without cyanosis, clubbing or edema.  Normal range of motion in all extremities. No joint effusion, erythema or warmth.  NEUROLOGIC:  Gait without abnormality.  No tremor.   PELVIC: External:  Swelling and erythema of labia.  Vaginal:  Thick white cottage cheese like discharge.  No odor.  Cervix:  Normal appearance.      Wet prep: Negative clue cells or Trichomonas.  Positive hyphae.  KOH: Positive hyphae.    ASSESSMENT:  1. Vulvovaginitis due to yeast    2. Female infertility    3. MAYE (generalized anxiety disorder)    4. Primary insomnia    5. S/P myomectomy      PLAN:  1. Fluconazole 100 mg every 3 days x3 doses.    2. Consider probiotics with future antibiotic use.    3.  Follow-up if no improvement or worsening symptoms.    20 minutes of total time spent on the encounter, which includes face to face time and non-face to face time preparing to see the patient (eg, review of tests), Obtaining and/or reviewing separately obtained history, Documenting clinical information in the electronic or other health record, Independently interpreting results (not separately reported) and communicating results to the patient/family/caregiver, or Care coordination (not separately reported).     This note is generated with speech  recognition software and is subject to transcription error and sound alike phrases that may be missed by proofreading.

## 2024-12-10 DIAGNOSIS — Z00.00 ROUTINE HEALTH MAINTENANCE: ICD-10-CM

## 2024-12-10 DIAGNOSIS — Z76.89 ESTABLISHING CARE WITH NEW DOCTOR, ENCOUNTER FOR: Primary | ICD-10-CM

## 2024-12-11 ENCOUNTER — HOSPITAL ENCOUNTER (OUTPATIENT)
Dept: CARDIOLOGY | Facility: HOSPITAL | Age: 44
Discharge: HOME | End: 2024-12-11
Attending: STUDENT IN AN ORGANIZED HEALTH CARE EDUCATION/TRAINING PROGRAM
Payer: COMMERCIAL

## 2024-12-11 ENCOUNTER — OFFICE VISIT (OUTPATIENT)
Dept: CARDIOLOGY | Facility: CLINIC | Age: 44
End: 2024-12-11
Payer: COMMERCIAL

## 2024-12-11 VITALS
OXYGEN SATURATION: 98 % | SYSTOLIC BLOOD PRESSURE: 114 MMHG | BODY MASS INDEX: 24.24 KG/M2 | HEIGHT: 67 IN | DIASTOLIC BLOOD PRESSURE: 70 MMHG | HEART RATE: 80 BPM | WEIGHT: 154.44 LBS

## 2024-12-11 DIAGNOSIS — Z76.89 ESTABLISHING CARE WITH NEW DOCTOR, ENCOUNTER FOR: ICD-10-CM

## 2024-12-11 DIAGNOSIS — Z00.00 ROUTINE HEALTH MAINTENANCE: ICD-10-CM

## 2024-12-11 DIAGNOSIS — F41.1 GAD (GENERALIZED ANXIETY DISORDER): Chronic | ICD-10-CM

## 2024-12-11 DIAGNOSIS — R00.0 TACHYCARDIA: Primary | ICD-10-CM

## 2024-12-11 DIAGNOSIS — G89.29 CHRONIC BILATERAL LOW BACK PAIN WITH RIGHT-SIDED SCIATICA: ICD-10-CM

## 2024-12-11 DIAGNOSIS — G47.33 OSA (OBSTRUCTIVE SLEEP APNEA): ICD-10-CM

## 2024-12-11 DIAGNOSIS — M54.41 CHRONIC BILATERAL LOW BACK PAIN WITH RIGHT-SIDED SCIATICA: ICD-10-CM

## 2024-12-11 PROCEDURE — 3078F DIAST BP <80 MM HG: CPT | Mod: CPTII,S$GLB,, | Performed by: STUDENT IN AN ORGANIZED HEALTH CARE EDUCATION/TRAINING PROGRAM

## 2024-12-11 PROCEDURE — 3074F SYST BP LT 130 MM HG: CPT | Mod: CPTII,S$GLB,, | Performed by: STUDENT IN AN ORGANIZED HEALTH CARE EDUCATION/TRAINING PROGRAM

## 2024-12-11 PROCEDURE — 99204 OFFICE O/P NEW MOD 45 MIN: CPT | Mod: S$GLB,,, | Performed by: STUDENT IN AN ORGANIZED HEALTH CARE EDUCATION/TRAINING PROGRAM

## 2024-12-11 PROCEDURE — 93010 ELECTROCARDIOGRAM REPORT: CPT | Mod: ,,, | Performed by: INTERNAL MEDICINE

## 2024-12-11 PROCEDURE — 3044F HG A1C LEVEL LT 7.0%: CPT | Mod: CPTII,S$GLB,, | Performed by: STUDENT IN AN ORGANIZED HEALTH CARE EDUCATION/TRAINING PROGRAM

## 2024-12-11 PROCEDURE — 3008F BODY MASS INDEX DOCD: CPT | Mod: CPTII,S$GLB,, | Performed by: STUDENT IN AN ORGANIZED HEALTH CARE EDUCATION/TRAINING PROGRAM

## 2024-12-11 PROCEDURE — 1159F MED LIST DOCD IN RCRD: CPT | Mod: CPTII,S$GLB,, | Performed by: STUDENT IN AN ORGANIZED HEALTH CARE EDUCATION/TRAINING PROGRAM

## 2024-12-11 PROCEDURE — 99999 PR PBB SHADOW E&M-EST. PATIENT-LVL IV: CPT | Mod: PBBFAC,,, | Performed by: STUDENT IN AN ORGANIZED HEALTH CARE EDUCATION/TRAINING PROGRAM

## 2024-12-11 PROCEDURE — 93005 ELECTROCARDIOGRAM TRACING: CPT

## 2024-12-11 NOTE — PROGRESS NOTES
Section of Cardiology                  Cardiac Clinic Note    Chief Complaint/Reason for consultation: establish care       HPI:   Shasta Rivera is a 44 y.o. female with h/o chronic back pain, SAMIRA, anxiety who comes in to cardiology clinic for evaluation.    12/11/24  Reports h/o high HR for years  Can sometimes feel palpitations, with SOB   Takes toprol xl prn  Does not drink a lot of water   Now 1 cup of coffee a day  Had an accident that caused back pain  Has Raynaud's disease 4-5 years now, never been on medications   Will be undergoing IVF   Does not wear CPAP all the time- runs in the family     Denies tobacco abuse  Etoh social    Family hx: no heart disease     Denies HLD, HTN, CVA, DM    Denies SOB, nausea, chest pain, syncope      EKG 12/11/24 NSR, no acute ST - T wave changes    ECHO  No results found for this or any previous visit.       STRESS TEST No results found for this or any previous visit.       LHC No results found for this or any previous visit.            ROS: All 10 systems reviewed. Please refer to the HPI for pertinent positives. All other systems negative.     Past Medical History  Past Medical History:   Diagnosis Date    Abnormal cervical Pap smear with positive HPV DNA test     Allergic rhinitis     Anemia     Herpes simplex type 2 infection     Onychomycosis     SAMIRA on CPAP     DOES NOT USE    Raynaud's phenomenon     Uterine fibroid        Surgical History  Past Surgical History:   Procedure Laterality Date    COLONOSCOPY N/A 7/12/2024    Procedure: COLONOSCOPY;  Surgeon: Juliann Beltre MD;  Location: [facility];  Service: Gastroenterology;  Laterality: N/A;    DILATION AND CURETTAGE OF UTERUS      for EAB    LUMBAR EPIDURAL INJECTION      MYOMECTOMY N/A 10/11/2021    Procedure: MYOMECTOMY;  Surgeon: Carol Ann Casarez MD;  Location: [facility] OR;  Service: OB/GYN;  Laterality: N/A;          Allergies:   Review of patient's allergies indicates:  No Known  Allergies    Social History:  Social History     Socioeconomic History    Marital status:    Occupational History     Employer: [employer]   Tobacco Use    Smoking status: Never    Smokeless tobacco: Never   Substance and Sexual Activity    Alcohol use: Yes     Alcohol/week: 7.0 standard drinks of alcohol     Types: 7 Glasses of wine per week    Drug use: No    Sexual activity: Yes     Partners: Male   Social History Narrative    The patient is  who lives alone and works as a .  She has a Masters in Civil Engineering.       Family History:  family history includes Breast cancer in her maternal grandmother, paternal grandmother, and another family member; Cancer in her maternal grandmother; Colon cancer in her father and maternal grandmother; Diabetes in her mother and paternal grandmother; Hyperlipidemia in her father and mother; Hypertension in her mother and paternal grandmother; Prostate cancer in her father; Sleep apnea in her brother; Stroke in her maternal grandfather; Uterine cancer in her paternal grandmother.    Home Medications:  Current Outpatient Medications on File Prior to Visit   Medication Sig Dispense Refill    12 HOUR DECONGESTANT 120 mg 12 hr tablet TAKE 1 TABLET BY MOUTH TWICE A DAY 60 tablet 5    ALPRAZolam (XANAX) 0.25 MG tablet TAKE 1-2 TABLETS BY MOUTH 3 TIMES DAILY AS NEEDED FOR ANXIETY. 30 tablet 3    benzonatate (TESSALON) 200 MG capsule Take 1 capsule (200 mg total) by mouth 3 (three) times daily as needed for Cough. 30 capsule 0    buPROPion (WELLBUTRIN) 100 MG tablet Take 1 tablet (100 mg total) by mouth once daily. 30 tablet 5    clobetasoL (CLOBEX) 0.05 % shampoo Apply topically.      docusate sodium (COLACE) 100 MG capsule Take 1 capsule (100 mg total) by mouth 2 (two) times daily. 30 capsule 0    fluticasone propionate (FLONASE) 50 mcg/actuation nasal spray USE 2 SPRAYS IN EACH NOSTRIL DAILY AT 6 AM 16 mL 3    furosemide (LASIX) 20 MG tablet TAKE 1 TABLET BY  "MOUTH DAILY AS NEEDED FOR LEG SWELLING 20 tablet 0    HYDROcodone-acetaminophen (NORCO)  mg per tablet Take 1 tablet by mouth 4 (four) times daily as needed.      ibuprofen (ADVIL,MOTRIN) 800 MG tablet Take 1 tablet (800 mg total) by mouth every 8 (eight) hours. 30 tablet 0    metoprolol succinate (TOPROL-XL) 25 MG 24 hr tablet TAKE 1 TABLET BY MOUTH DAILY AS NEEDED. 90 tablet 1    multivitamin capsule Take 1 capsule by mouth once daily.      polyethylene glycol (GLYCOLAX) 17 gram PwPk Take 17 g by mouth daily as needed.  0    RETIN-A MICRO PUMP 0.06 % GlwP Apply 1 application topically nightly.      valACYclovir (VALTREX) 500 MG tablet Take 1 tablet (500 mg total) by mouth once daily. 90 tablet 3    zolpidem (AMBIEN) 5 MG Tab TAKE 1 TABLET BY MOUTH EVERY DAY AT NIGHT AS NEEDED FOR SLEEP 30 tablet 5    levocetirizine (XYZAL) 5 MG tablet Take 1 tablet (5 mg total) by mouth every evening. 90 tablet 1     No current facility-administered medications on file prior to visit.       Physical exam:  /70 (BP Location: Left arm, Patient Position: Sitting)   Pulse 80   Ht 5' 7" (1.702 m)   Wt 70.1 kg (154 lb 6.9 oz)   LMP  (LMP Unknown)   SpO2 98%   BMI 24.19 kg/m²         General: Pt is a 44 y.o. year old female who is AAOx3, in NAD, is pleasant, well nourished, looks stated age  HEENT: PERRL, EOMI, Oral mucosa pink & moist  CVS  No abnormal cardiac pulsations noted on inspection. JVP not raised. The apical impulse is normal on palpation, and is located in the left 5th intercostal space in the mid - clavicular line. No palpable thrills or abnormal pulsations noted. RR, S1 - S2 heard, no murmurs, rubs or gallops appreciated.   PUL : CTA B/L. No wheezes/crackles heard   ABD : BS +, soft. No tenderness elicited   LE : No C/C/E. Distal Pulses palpable B/L         LABS:    Chemistry:   Lab Results   Component Value Date     05/24/2024    K 4.5 05/24/2024     05/24/2024    CO2 23 05/24/2024    BUN 14 " "05/24/2024    CREATININE 0.9 05/24/2024    CALCIUM 9.7 05/24/2024     Cardiac Markers: No results found for: "CKTOTAL", "CKMB", "CKMBINDEX", "TROPONINI"  Cardiac Markers (Last 3): No results found for: "CKTOTAL", "CKMB", "CKMBINDEX", "TROPONINI"  CBC:   Lab Results   Component Value Date    WBC 6.85 02/23/2024    HGB 12.3 02/23/2024    HCT 37.9 02/23/2024    MCV 92 02/23/2024     02/23/2024     Lipids:   Lab Results   Component Value Date    CHOL 188 02/23/2024    TRIG 39 02/23/2024    HDL 71 02/23/2024     Coagulation: No results found for: "PT", "INR", "APTT"        Assessment        1. MAYE (generalized anxiety disorder)    2. SAMIRA (obstructive sleep apnea)    3. Chronic bilateral low back pain with right-sided sciatica    4. Establishing care with new doctor, encounter for         Plan:    Will be undergoing IVF and get pregnant at advanced maternal age  Reports palpitations  Obtain echo  Obtain 14 day monitor  Recommend regular wear of CPAP machine  Low salt, low fat diet  Exercise as tolerated, at least 30 min daily     This note was prepared using voice recognition system and is likely to have sound alike errors that may have been overlooked even after proofreading.     I have reviewed all pertinent chart information.  Plans and recommendations have been formulated under my direct supervision. All questions answered and patient voiced understanding.   If symptoms persist go to the ED.    RTC prn        Latesha Horowitz MD  Cardiology          "

## 2024-12-12 LAB
OHS QRS DURATION: 82 MS
OHS QTC CALCULATION: 411 MS

## 2024-12-23 ENCOUNTER — OFFICE VISIT (OUTPATIENT)
Dept: SURGERY | Facility: CLINIC | Age: 44
End: 2024-12-23
Payer: COMMERCIAL

## 2024-12-23 ENCOUNTER — HOSPITAL ENCOUNTER (OUTPATIENT)
Dept: CARDIOLOGY | Facility: HOSPITAL | Age: 44
Discharge: HOME | End: 2024-12-23
Attending: STUDENT IN AN ORGANIZED HEALTH CARE EDUCATION/TRAINING PROGRAM
Payer: COMMERCIAL

## 2024-12-23 ENCOUNTER — HOSPITAL ENCOUNTER (OUTPATIENT)
Dept: RADIOLOGY | Facility: HOSPITAL | Age: 44
Discharge: HOME | End: 2024-12-23
Attending: SURGERY
Payer: COMMERCIAL

## 2024-12-23 VITALS
DIASTOLIC BLOOD PRESSURE: 73 MMHG | WEIGHT: 155.88 LBS | SYSTOLIC BLOOD PRESSURE: 116 MMHG | HEART RATE: 81 BPM | BODY MASS INDEX: 24.41 KG/M2

## 2024-12-23 VITALS
WEIGHT: 154 LBS | BODY MASS INDEX: 24.17 KG/M2 | HEIGHT: 67 IN | SYSTOLIC BLOOD PRESSURE: 116 MMHG | DIASTOLIC BLOOD PRESSURE: 73 MMHG | HEART RATE: 73 BPM

## 2024-12-23 DIAGNOSIS — R00.0 TACHYCARDIA: ICD-10-CM

## 2024-12-23 DIAGNOSIS — F41.1 GAD (GENERALIZED ANXIETY DISORDER): Chronic | ICD-10-CM

## 2024-12-23 DIAGNOSIS — G89.29 CHRONIC BILATERAL LOW BACK PAIN WITH RIGHT-SIDED SCIATICA: ICD-10-CM

## 2024-12-23 DIAGNOSIS — M54.41 CHRONIC BILATERAL LOW BACK PAIN WITH RIGHT-SIDED SCIATICA: ICD-10-CM

## 2024-12-23 DIAGNOSIS — Z91.89 AT HIGH RISK FOR BREAST CANCER: ICD-10-CM

## 2024-12-23 DIAGNOSIS — Z91.89 AT HIGH RISK FOR BREAST CANCER: Primary | ICD-10-CM

## 2024-12-23 DIAGNOSIS — G47.33 OSA (OBSTRUCTIVE SLEEP APNEA): ICD-10-CM

## 2024-12-23 LAB
AORTIC ROOT ANNULUS: 2.62 CM
ASCENDING AORTA: 2.2 CM
AV INDEX (PROSTH): 0.7
AV MEAN GRADIENT: 4.1 MMHG
AV PEAK GRADIENT: 6.8 MMHG
AV VALVE AREA BY VELOCITY RATIO: 2.4 CM²
AV VALVE AREA: 2.2 CM²
AV VELOCITY RATIO: 0.77
BSA FOR ECHO PROCEDURE: 1.82 M2
CV ECHO LV RWT: 0.29 CM
DOP CALC AO PEAK VEL: 1.3 M/S
DOP CALC AO VTI: 30.7 CM
DOP CALC LVOT AREA: 3.1 CM2
DOP CALC LVOT DIAMETER: 2 CM
DOP CALC LVOT PEAK VEL: 1 M/S
DOP CALC LVOT STROKE VOLUME: 67.8 CM3
DOP CALC RVOT PEAK VEL: 0.64 M/S
DOP CALC RVOT VTI: 13.3 CM
DOP CALCLVOT PEAK VEL VTI: 21.6 CM
E WAVE DECELERATION TIME: 166.63 MSEC
E/A RATIO: 1.41
E/E' RATIO: 7.19 M/S
ECHO LV POSTERIOR WALL: 0.6 CM (ref 0.6–1.1)
FRACTIONAL SHORTENING: 31.7 % (ref 28–44)
INTERVENTRICULAR SEPTUM: 0.6 CM (ref 0.6–1.1)
IVC DIAMETER: 1.61 CM
IVRT: 49.48 MSEC
LA MAJOR: 5.02 CM
LA MINOR: 4.47 CM
LA WIDTH: 3.4 CM
LEFT ATRIUM AREA SYSTOLIC (APICAL 2 CHAMBER): 14.85 CM2
LEFT ATRIUM AREA SYSTOLIC (APICAL 4 CHAMBER): 15.7 CM2
LEFT ATRIUM SIZE: 2.67 CM
LEFT ATRIUM VOLUME INDEX MOD: 21.6 ML/M2
LEFT ATRIUM VOLUME INDEX: 20.2 ML/M2
LEFT ATRIUM VOLUME MOD: 39.18 ML
LEFT ATRIUM VOLUME: 36.49 CM3
LEFT INTERNAL DIMENSION IN SYSTOLE: 2.8 CM (ref 2.1–4)
LEFT VENTRICLE DIASTOLIC VOLUME INDEX: 40.16 ML/M2
LEFT VENTRICLE DIASTOLIC VOLUME: 72.69 ML
LEFT VENTRICLE END SYSTOLIC VOLUME APICAL 2 CHAMBER: 39.14 ML
LEFT VENTRICLE END SYSTOLIC VOLUME APICAL 4 CHAMBER: 35.9 ML
LEFT VENTRICLE MASS INDEX: 37.1 G/M2
LEFT VENTRICLE SYSTOLIC VOLUME INDEX: 15.6 ML/M2
LEFT VENTRICLE SYSTOLIC VOLUME: 28.21 ML
LEFT VENTRICULAR INTERNAL DIMENSION IN DIASTOLE: 4.1 CM (ref 3.5–6)
LEFT VENTRICULAR MASS: 67.1 G
LV LATERAL E/E' RATIO: 5.71 M/S
LV SEPTAL E/E' RATIO: 9.7 M/S
LVED V (TEICH): 72.69 ML
LVES V (TEICH): 28.21 ML
LVOT MG: 2.28 MMHG
LVOT MV: 0.7 CM/S
MV PEAK A VEL: 0.69 M/S
MV PEAK E VEL: 0.97 M/S
MV STENOSIS PRESSURE HALF TIME: 48.32 MS
MV VALVE AREA P 1/2 METHOD: 4.55 CM2
OHS CV RV/LV RATIO: 0.76 CM
PISA TR MAX VEL: 2.21 M/S
PULM VEIN S/D RATIO: 1.07
PV MEAN GRADIENT: 1 MMHG
PV PEAK D VEL: 0.69 M/S
PV PEAK GRADIENT: 3 MMHG
PV PEAK S VEL: 0.74 M/S
PV PEAK VELOCITY: 0.92 M/S
RA MAJOR: 3.41 CM
RA PRESSURE ESTIMATED: 3 MMHG
RA WIDTH: 3.44 CM
RIGHT VENTRICLE DIASTOLIC BASEL DIMENSION: 3.1 CM
RIGHT VENTRICULAR END-DIASTOLIC DIMENSION: 3.13 CM
RV TB RVSP: 5 MMHG
SINUS: 2.49 CM
STJ: 2.33 CM
TDI LATERAL: 0.17 M/S
TDI SEPTAL: 0.1 M/S
TDI: 0.14 M/S
TR MAX PG: 20 MMHG
TRICUSPID ANNULAR PLANE SYSTOLIC EXCURSION: 2.38 CM
TV REST PULMONARY ARTERY PRESSURE: 23 MMHG
Z-SCORE OF LEFT VENTRICULAR DIMENSION IN END DIASTOLE: -2
Z-SCORE OF LEFT VENTRICULAR DIMENSION IN END SYSTOLE: -0.79

## 2024-12-23 PROCEDURE — 1160F RVW MEDS BY RX/DR IN RCRD: CPT | Mod: CPTII,S$GLB,, | Performed by: SURGERY

## 2024-12-23 PROCEDURE — 3044F HG A1C LEVEL LT 7.0%: CPT | Mod: CPTII,S$GLB,, | Performed by: SURGERY

## 2024-12-23 PROCEDURE — 3078F DIAST BP <80 MM HG: CPT | Mod: CPTII,S$GLB,, | Performed by: SURGERY

## 2024-12-23 PROCEDURE — 3008F BODY MASS INDEX DOCD: CPT | Mod: CPTII,S$GLB,, | Performed by: SURGERY

## 2024-12-23 PROCEDURE — 3074F SYST BP LT 130 MM HG: CPT | Mod: CPTII,S$GLB,, | Performed by: SURGERY

## 2024-12-23 PROCEDURE — 99214 OFFICE O/P EST MOD 30 MIN: CPT | Mod: S$GLB,,, | Performed by: SURGERY

## 2024-12-23 PROCEDURE — 77067 SCR MAMMO BI INCL CAD: CPT | Mod: 26,,, | Performed by: RADIOLOGY

## 2024-12-23 PROCEDURE — 1159F MED LIST DOCD IN RCRD: CPT | Mod: CPTII,S$GLB,, | Performed by: SURGERY

## 2024-12-23 PROCEDURE — 77063 BREAST TOMOSYNTHESIS BI: CPT | Mod: 26,,, | Performed by: RADIOLOGY

## 2024-12-23 PROCEDURE — 93306 TTE W/DOPPLER COMPLETE: CPT

## 2024-12-23 PROCEDURE — 99999 PR PBB SHADOW E&M-EST. PATIENT-LVL IV: CPT | Mod: PBBFAC,,, | Performed by: SURGERY

## 2024-12-23 PROCEDURE — 93306 TTE W/DOPPLER COMPLETE: CPT | Mod: 26,,, | Performed by: INTERNAL MEDICINE

## 2024-12-23 PROCEDURE — 77067 SCR MAMMO BI INCL CAD: CPT | Mod: TC

## 2024-12-23 NOTE — PROGRESS NOTES
History & Physical    SUBJECTIVE:     History of Present Illness:  Patient is a 44 y.o. female presents for high-risk breast cancer screening.  She underwent mammogram this morning - Results pending  She denies any changes with her breast including breast mass, nipple discharge, breast pain.     Initially referred for high risk breast cancer screening. She was recently noted to have elevated tyrer cuzik score on recent mammogram. Denies any breast mass, pain, nipple discharge, or other changes. No prior breast surgery or abnormal mammograms. Family history of breast cancer in maternal and paternal grandmothers. Menarche age 10, , eggs frozen and considering pregnancy in near future.    Chief Complaint   Patient presents with    Follow-up     CBE       Review of patient's allergies indicates:  No Known Allergies    Current Outpatient Medications   Medication Sig Dispense Refill    12 HOUR DECONGESTANT 120 mg 12 hr tablet TAKE 1 TABLET BY MOUTH TWICE A DAY 60 tablet 5    ALPRAZolam (XANAX) 0.25 MG tablet TAKE 1-2 TABLETS BY MOUTH 3 TIMES DAILY AS NEEDED FOR ANXIETY. 30 tablet 3    benzonatate (TESSALON) 200 MG capsule Take 1 capsule (200 mg total) by mouth 3 (three) times daily as needed for Cough. 30 capsule 0    buPROPion (WELLBUTRIN) 100 MG tablet Take 1 tablet (100 mg total) by mouth once daily. 30 tablet 5    clobetasoL (CLOBEX) 0.05 % shampoo Apply topically.      docusate sodium (COLACE) 100 MG capsule Take 1 capsule (100 mg total) by mouth 2 (two) times daily. 30 capsule 0    fluticasone propionate (FLONASE) 50 mcg/actuation nasal spray USE 2 SPRAYS IN EACH NOSTRIL DAILY AT 6 AM 16 mL 3    furosemide (LASIX) 20 MG tablet TAKE 1 TABLET BY MOUTH DAILY AS NEEDED FOR LEG SWELLING 20 tablet 0    HYDROcodone-acetaminophen (NORCO)  mg per tablet Take 1 tablet by mouth 4 (four) times daily as needed.      ibuprofen (ADVIL,MOTRIN) 800 MG tablet Take 1 tablet (800 mg total) by mouth every 8 (eight) hours. 30  tablet 0    metoprolol succinate (TOPROL-XL) 25 MG 24 hr tablet TAKE 1 TABLET BY MOUTH DAILY AS NEEDED. 90 tablet 1    multivitamin capsule Take 1 capsule by mouth once daily.      polyethylene glycol (GLYCOLAX) 17 gram PwPk Take 17 g by mouth daily as needed.  0    RETIN-A MICRO PUMP 0.06 % GlwP Apply 1 application topically nightly.      valACYclovir (VALTREX) 500 MG tablet Take 1 tablet (500 mg total) by mouth once daily. 90 tablet 3    zolpidem (AMBIEN) 5 MG Tab TAKE 1 TABLET BY MOUTH EVERY DAY AT NIGHT AS NEEDED FOR SLEEP 30 tablet 5    levocetirizine (XYZAL) 5 MG tablet Take 1 tablet (5 mg total) by mouth every evening. 90 tablet 1     No current facility-administered medications for this visit.       Past Medical History:   Diagnosis Date    Abnormal cervical Pap smear with positive HPV DNA test     Allergic rhinitis     Anemia     Herpes simplex type 2 infection     Onychomycosis     SAMIRA on CPAP     DOES NOT USE    Raynaud's phenomenon     Uterine fibroid      Past Surgical History:   Procedure Laterality Date    COLONOSCOPY N/A 7/12/2024    Procedure: COLONOSCOPY;  Surgeon: Juliann Beltre MD;  Location: [facility];  Service: Gastroenterology;  Laterality: N/A;    DILATION AND CURETTAGE OF UTERUS      for EAB    LUMBAR EPIDURAL INJECTION      MYOMECTOMY N/A 10/11/2021    Procedure: MYOMECTOMY;  Surgeon: Carol Ann Casarez MD;  Location: [facility];  Service: OB/GYN;  Laterality: N/A;     Family History   Problem Relation Name Age of Onset    Diabetes Mother      Hypertension Mother      Hyperlipidemia Mother      Colon cancer Father      Hyperlipidemia Father      Prostate cancer Father      Breast cancer Maternal Grandmother      Cancer Maternal Grandmother      Colon cancer Maternal Grandmother      Stroke Maternal Grandfather      Breast cancer Paternal Grandmother      Diabetes Paternal Grandmother      Hypertension Paternal Grandmother      Uterine cancer Paternal Grandmother      Sleep apnea Brother       Breast cancer Other Great Aunt      Social History     Tobacco Use    Smoking status: Never    Smokeless tobacco: Never   Substance Use Topics    Alcohol use: Yes     Alcohol/week: 7.0 standard drinks of alcohol     Types: 7 Glasses of wine per week    Drug use: No        Review of Systems:  Review of Systems   Constitutional:  Negative for chills, fatigue, fever and unexpected weight change.   Respiratory:  Negative for cough, shortness of breath, wheezing and stridor.    Cardiovascular:  Negative for chest pain, palpitations and leg swelling.   Gastrointestinal:  Negative for abdominal distention, abdominal pain, constipation, diarrhea, nausea and vomiting.   Genitourinary:  Negative for difficulty urinating, dysuria, frequency, hematuria and urgency.   Skin:  Negative for color change, pallor, rash and wound.   Hematological:  Does not bruise/bleed easily.       OBJECTIVE:     Vital Signs (Most Recent)  Pulse: 81 (12/23/24 0911)  BP: 116/73 (12/23/24 0911)     70.7 kg (155 lb 13.8 oz)     Physical Exam:  Physical Exam  Vitals reviewed.   Constitutional:       General: She is not in acute distress.     Appearance: She is well-developed. She is not diaphoretic.   HENT:      Head: Normocephalic and atraumatic.      Right Ear: External ear normal.      Left Ear: External ear normal.   Eyes:      General: No scleral icterus.     Conjunctiva/sclera: Conjunctivae normal.      Pupils: Pupils are equal, round, and reactive to light.   Neck:      Thyroid: No thyromegaly.      Trachea: No tracheal deviation.   Cardiovascular:      Rate and Rhythm: Normal rate and regular rhythm.      Heart sounds: Normal heart sounds. No murmur heard.     No friction rub. No gallop.   Pulmonary:      Effort: Pulmonary effort is normal. No respiratory distress.      Breath sounds: Normal breath sounds. No wheezing or rales.   Chest:      Chest wall: No tenderness.   Breasts:     Right: No inverted nipple, mass, nipple discharge, skin  change or tenderness.      Left: No inverted nipple, mass, nipple discharge, skin change or tenderness.   Abdominal:      General: Bowel sounds are normal. There is no distension.      Palpations: Abdomen is soft.      Tenderness: There is no abdominal tenderness.      Hernia: No hernia is present.   Musculoskeletal:         General: No tenderness or deformity. Normal range of motion.      Cervical back: Normal range of motion and neck supple.   Lymphadenopathy:      Cervical: No cervical adenopathy.   Skin:     General: Skin is warm and dry.      Coloration: Skin is not pale.      Findings: No erythema or rash.   Neurological:      Mental Status: She is alert and oriented to person, place, and time.   Psychiatric:         Behavior: Behavior normal.         Thought Content: Thought content normal.         Judgment: Judgment normal.         Diagnostic Results:  Result:   MRI Breast w/wo Contrast, w/CAD, Bilateral     History:  Patient is 44 y.o. and is seen for at high risk for breast cancer.        Films Compared:  Prior images (if available) were compared. Mammo 10-19-23     Technique:  A routine breast MRI was performed with a dedicated breast coil. Pre-contrast STIR were acquired. Then, pre and post contrast T1 weighted fat saturated images were acquired and subtracted with MIP reconstruction. intravenous gadolinium contrast was administered. The study was reviewed with [employer]  software.     Findings:  The breasts have heterogeneous fibroglandular tissue. The background parenchymal enhancement is minimal.     No significant masses, enhancements, or other abnormal findings are seen.     Impression:  No significant masses, enhancements, or other abnormal findings are seen.     BI-RADS Category:   Overall: 1 - Negative     Recommendation:  Return to annual screening mammogram schedule is recommended     Your estimated lifetime risk of breast cancer (to age 85) based on Tyrer-Cuzick risk assessment model is  Tyrer-Cuzick: 21.84%. According to the American [employer] Society, patients with a lifetime breast cancer risk of 20% or higher might benefit from supplemental screening tests.    ASSESSMENT/PLAN:     44 y/o female at high risk for breast cancer    PLAN:Plan     No abnormal findings on breast exam   Breast mammogram pending will call patient with results.  If negative and remains with elevated tyrer cuzik score will plan for six-month follow-up with interval mammogram and breast exam      Previously discussion on high-risk breast cancer screenin. Elevated risk for breast cancer calculated at the time of her recent mammogram- mammogram wnl  2. Negative clinical examination  3. Explained that her family history of breast cancer and OB history elevates her risk. Pt denies any knowledge of genetic testing.   4. Encourage 30 min of exercise daily  5. Discussed increase risk of breast cancer and recommendations for additional screening as a result- MRI of the breasts in 6 months with breast examination- risks may decrease in the future and she would fall out of the breast cancer high risk clinic as a result. Will plan to do MRI in 6 months and see her a week later for results and exam.   6. Proper BSE technique explained and demonstrated- pt verbalized understanding- pt is to call if notes any changes. Contact information given     30 minutes of total time spent on the encounter, which includes face to face time and non-face to face time preparing to see the patient (eg, review of tests), Obtaining and/or reviewing separately obtained history, Documenting clinical information in the electronic or other health record, Independently interpreting results (not separately reported) and communicating results to the patient/family/caregiver, or Care coordination (not separately reported).

## 2025-01-14 ENCOUNTER — PATIENT MESSAGE (OUTPATIENT)
Dept: SURGERY | Facility: HOSPITAL | Age: 45
End: 2025-01-14
Payer: COMMERCIAL

## 2025-01-28 DIAGNOSIS — F41.1 GAD (GENERALIZED ANXIETY DISORDER): Primary | Chronic | ICD-10-CM

## 2025-01-29 NOTE — TELEPHONE ENCOUNTER
Care Due:                  Date            Visit Type   Department     Provider  --------------------------------------------------------------------------------                                MYCHART                              FOLLOWUP/OF  [facility]  Last Visit: 11-      [facility] VISIT   MEDICINE       Mary Redmond  Next Visit: None Scheduled  None         None Found                                                            Last  Test          Frequency    Reason                     Performed    Due Date  --------------------------------------------------------------------------------    CBC.........  12 months..  valACYclovir.............  02- 02-    [facility] Embedded Care Due Messages. Reference number: [id number].   1/28/2025 11:16:49 PM CST

## 2025-01-30 RX ORDER — ALPRAZOLAM 0.25 MG/1
TABLET ORAL
Qty: 30 TABLET | Refills: 3 | Status: SHIPPED | OUTPATIENT
Start: 2025-01-30

## 2025-02-25 ENCOUNTER — OFFICE VISIT (OUTPATIENT)
Dept: FAMILY MEDICINE | Facility: CLINIC | Age: 45
End: 2025-02-25
Payer: COMMERCIAL

## 2025-02-25 DIAGNOSIS — F41.1 GAD (GENERALIZED ANXIETY DISORDER): Chronic | ICD-10-CM

## 2025-02-25 DIAGNOSIS — F90.0 ATTENTION DEFICIT HYPERACTIVITY DISORDER (ADHD), PREDOMINANTLY INATTENTIVE TYPE: Primary | ICD-10-CM

## 2025-02-25 DIAGNOSIS — M54.41 CHRONIC BILATERAL LOW BACK PAIN WITH RIGHT-SIDED SCIATICA: ICD-10-CM

## 2025-02-25 DIAGNOSIS — F51.01 PRIMARY INSOMNIA: ICD-10-CM

## 2025-02-25 DIAGNOSIS — G89.29 CHRONIC BILATERAL LOW BACK PAIN WITH RIGHT-SIDED SCIATICA: ICD-10-CM

## 2025-02-25 PROCEDURE — 1160F RVW MEDS BY RX/DR IN RCRD: CPT | Mod: CPTII,95,, | Performed by: FAMILY MEDICINE

## 2025-02-25 PROCEDURE — G2211 COMPLEX E/M VISIT ADD ON: HCPCS | Mod: 95,,, | Performed by: FAMILY MEDICINE

## 2025-02-25 PROCEDURE — 1159F MED LIST DOCD IN RCRD: CPT | Mod: CPTII,95,, | Performed by: FAMILY MEDICINE

## 2025-02-25 PROCEDURE — 98006 SYNCH AUDIO-VIDEO EST MOD 30: CPT | Mod: 95,,, | Performed by: FAMILY MEDICINE

## 2025-02-25 RX ORDER — DEXTROAMPHETAMINE SACCHARATE, AMPHETAMINE ASPARTATE, DEXTROAMPHETAMINE SULFATE AND AMPHETAMINE SULFATE 1.875; 1.875; 1.875; 1.875 MG/1; MG/1; MG/1; MG/1
1 TABLET ORAL DAILY
Qty: 30 TABLET | Refills: 0 | Status: SHIPPED | OUTPATIENT
Start: 2025-02-25

## 2025-02-25 NOTE — PROGRESS NOTES
The patient location is: At work    The chief complaint leading to consultation is:  Inability to focus    Visit type: audiovisual    Face to Face time with patient: 20 min  30 minutes of total time spent on the encounter, which includes face to face time and non-face to face time preparing to see the patient (eg, review of tests), Obtaining and/or reviewing separately obtained history, Documenting clinical information in the electronic or other health record, Independently interpreting results (not separately reported) and communicating results to the patient/family/caregiver, or Care coordination (not separately reported).     Each patient to whom he or she provides medical services by telemedicine is:  (1) informed of the relationship between the physician and patient and the respective role of any other health care provider with respect to management of the patient; and (2) notified that he or she may decline to receive medical services by telemedicine and may withdraw from such care at any time.      CHIEF COMPLAINT:  This is a 44-year-old female complaining of inability to focus.    SUBJECTIVE:  The patient has been having difficulty focusing at work for months.  She has difficulty starting on tasks and is easily distracted.  She was given Adderall 7.5 mg by a friend and found her ability to stay focused and complete job tasks  was much improved.  Patient requests prescription for medication.  Patient has no history of ADHD prior to age 11 or problems in school through college.    The patient is currently undergoing IVF.  She takes furosemide 20 mg daily as needed for lower extremity swelling.  She has a history of anemia. She occasionally uses alprazolam for anxiety.  She takes zolpidem 5 mg for sleeplessness.  She takes pseudoephedrine and Zyrtec for allergic rhinitis.  The patient often has numbness and discoloration of finger tips especially when exposed to cold. Patient has positive serology for herpes  simplex 2.  She  reports no past history of herpetic outbreaks.  Patient sees pain management for bulging discs and chronic back pain for which she takes opioids as needed. She is status post myomectomy in October 2021 and May 2024.       ROS:  GENERAL: Patient denies fever, chills, night sweats. Patient denies weight gain or loss. Patient denies anorexia, fatigue, weakness or swollen glands.  SKIN: Patient denies rash or hair loss.  HEENT: Patient denies sore throat, ear pain, hearing loss, nasal congestion, or runny nose. Patient denies visual disturbance, eye irritation or discharge.  LUNGS: Patient denies cough, wheeze or hemoptysis.  CARDIOVASCULAR: Patient denies chest pain, shortness of breath, palpitations, syncope or lower extremity edema.  GI: Patient denies abdominal pain, nausea, vomiting, diarrhea, constipation, blood in stool or melena.  GENITOURINARY: Patient denies pelvic pain, vaginal discharge or odor.  Patient denies irregular vaginal bleeding. Patient denies dysuria, frequency, hematuria, nocturia, urgency or incontinence.  Positive for vaginal itching and irritation.  BREASTS: Patient denies breast pain, mass or nipple discharge.  MUSCULOSKELETAL: Patient denies joint pain, swelling, redness or warmth.  NEUROLOGIC: Patient denies headache, vertigo, paresthesias, weakness in limb, dysarthria, dysphagia or abnormality of gait.  PSYCHIATRIC: Patient denies anxiety, depression, or memory loss. Positive for insomnia.  Positive for decreased concentration.     OBJECTIVE:   GENERAL: Well-developed well-nourished black female alert and oriented x3, in no acute distress. Memory, judgment and cognition without deficit.  Normal affect.  No hallucinations, delusions or flight of ideas.  SKIN: Clear without rash. Normal color and tone.  HEENT: Eyes: Clear conjunctivae. No scleral icterus.  Nose:  Not congested-sounding.    NECK: Supple, normal range of motion.  LUNGS:  Normal respiratory effort.  No audible  wheezing.  EXTREMITIES:  Normal range of motion in all extremities.  NEUROLOGIC:  Gait without abnormality.  No tremor.    Discussed the criteria for diagnosis of ADHD.  Patient fits the following DSM-IV criteria:  She often has difficulty sustaining attention in tasks.  She often does not seem to listen when spoken to directly.  She often does not follow through on instructions and fails to finish chores or duties in the workplace.  She often loses things necessary for tasks or activities.  She is often easily distracted by extraneous stimuli.  She is often forgetful in daily activities.  Impairment occurs at work and at home.  There is clear evidence of clinically significant impairment in social, academic or occupational functioning.    ASSESSMENT:  1. Attention deficit hyperactivity disorder (ADHD), predominantly inattentive type    2. MAYE (generalized anxiety disorder)    3. Primary insomnia    4. Chronic bilateral low back pain with right-sided sciatica      PLAN:  1. Adderall 7.5 mg daily.  Discussed pharmacology of medication including side effects and adverse reactions.  Cautioned regarding elevated blood pressure and pulse.  2. Follow-up in 2-4 weeks to assess effectiveness and any side effects.    This note is generated with speech recognition software and is subject to transcription error and sound alike phrases that may be missed by proofreading.

## 2025-03-07 ENCOUNTER — OFFICE VISIT (OUTPATIENT)
Dept: FAMILY MEDICINE | Facility: CLINIC | Age: 45
End: 2025-03-07
Payer: COMMERCIAL

## 2025-03-07 VITALS
OXYGEN SATURATION: 100 % | TEMPERATURE: 99 F | HEART RATE: 68 BPM | BODY MASS INDEX: 24.88 KG/M2 | SYSTOLIC BLOOD PRESSURE: 137 MMHG | HEIGHT: 67 IN | DIASTOLIC BLOOD PRESSURE: 79 MMHG | WEIGHT: 158.5 LBS

## 2025-03-07 DIAGNOSIS — L73.9 FOLLICULITIS: Primary | ICD-10-CM

## 2025-03-07 DIAGNOSIS — Z86.19 HISTORY OF HPV INFECTION: ICD-10-CM

## 2025-03-07 PROCEDURE — 99999 PR PBB SHADOW E&M-EST. PATIENT-LVL V: CPT | Mod: PBBFAC,,, | Performed by: STUDENT IN AN ORGANIZED HEALTH CARE EDUCATION/TRAINING PROGRAM

## 2025-03-07 RX ORDER — MUPIROCIN 20 MG/G
OINTMENT TOPICAL 3 TIMES DAILY
Qty: 30 G | Refills: 1 | Status: SHIPPED | OUTPATIENT
Start: 2025-03-07

## 2025-03-07 NOTE — PROGRESS NOTES
Patient ID: Shasta Rivera is a 44 y.o. female.    Chief Complaint: Female  Problem    History of Present Illness    CHIEF COMPLAINT:  Ms. Rivera presents today for evaluation of a painful genital lesion    HISTORY OF PRESENT ILLNESS:  She presents with a painful genital lesion on the external vaginal area with associated bleeding noted on underwear. She reports difficulty with healing as movement disrupts scab formation. The size of the lesion has remained constant. She denies purulent discharge.    GYNECOLOGIC HISTORY:  She has a history of HPV diagnosed in 2006 with subsequent normal pap smears, most recent HPV test negative in 2021. She has a history of herpes without any recent outbreaks. She reports cyclic boils associated with menstrual cycle since pre-sexual activity, which typically resolve spontaneously. She maintains regular OBGYN care with routine pap smears and is currently undergoing fertility treatment.    MEDICAL CARE:  She has a primary care physician whom she sees annually.      ROS:  General: -fever, -chills, -fatigue, -weight gain, -weight loss  Eyes: -vision changes, -redness, -discharge  ENT: -ear pain, -nasal congestion, -sore throat  Cardiovascular: -chest pain, -palpitations, -lower extremity edema  Respiratory: -cough, -shortness of breath  Gastrointestinal: -abdominal pain, -nausea, -vomiting, -diarrhea, -constipation, -blood in stool  Genitourinary: -dysuria, -hematuria, -frequency  Musculoskeletal: -joint pain, -muscle pain  Skin: -rash, -lesion  Neurological: -headache, -dizziness, -numbness, -tingling  Psychiatric: -anxiety, -depression, -sleep difficulty         Pmh, Psh, Family Hx, Social Hx updated in Epic Tabs today.       3/7/2025     8:54 AM 2/15/2024    10:25 AM 5/23/2023     1:01 PM 3/1/2022    12:59 PM 11/9/2018     4:38 PM   Depression Patient Health Questionnaire   Over the last two weeks how often have you been bothered by little interest or pleasure in doing things  Not at all Not at all Not at all Not at all  Not at all    Over the last two weeks how often have you been bothered by feeling down, depressed or hopeless Not at all Not at all Not at all Not at all Not at all    PHQ-2 Total Score 0 0 0 0 0       Data saved with a previous flowsheet row definition       Active Problem List with Overview Notes    Diagnosis Date Noted    Chronic bilateral low back pain with right-sided sciatica 05/24/2024    Herpes simplex type 2 infection 05/23/2023    S/P myomectomy 10/11/2021    Menorrhagia with regular cycle 06/30/2021    MAYE (generalized anxiety disorder) 05/04/2020    SAMIRA (obstructive sleep apnea) 01/16/2020     Autopap      Primary insomnia 01/30/2018    Uterine fibroid     Allergic rhinitis        Past Medical History:   Diagnosis Date    Abnormal cervical Pap smear with positive HPV DNA test     Allergic rhinitis     Anemia     Herpes simplex type 2 infection     Onychomycosis     SAMIRA on CPAP     DOES NOT USE    Raynaud's phenomenon     Uterine fibroid        Past Surgical History:   Procedure Laterality Date    COLONOSCOPY N/A 7/12/2024    Procedure: COLONOSCOPY;  Surgeon: Juliann Beltre MD;  Location: [facility];  Service: Gastroenterology;  Laterality: N/A;    DILATION AND CURETTAGE OF UTERUS      for EAB    LUMBAR EPIDURAL INJECTION      MYOMECTOMY N/A 10/11/2021    Procedure: MYOMECTOMY;  Surgeon: Carol Ann Casarez MD;  Location: [facility];  Service: OB/GYN;  Laterality: N/A;       Family History   Problem Relation Name Age of Onset    Diabetes Mother      Hypertension Mother      Hyperlipidemia Mother      Colon cancer Father      Hyperlipidemia Father      Prostate cancer Father      Breast cancer Maternal Grandmother      Cancer Maternal Grandmother      Colon cancer Maternal Grandmother      Stroke Maternal Grandfather      Breast cancer Paternal Grandmother      Diabetes Paternal Grandmother      Hypertension Paternal Grandmother      Uterine cancer Paternal  Grandmother      Sleep apnea Brother      Breast cancer Other Great Aunt        Social History     Socioeconomic History    Marital status:    Occupational History     Employer: [employer]   Tobacco Use    Smoking status: Never    Smokeless tobacco: Never   Substance and Sexual Activity    Alcohol use: Yes     Alcohol/week: 7.0 standard drinks of alcohol     Types: 7 Glasses of wine per week    Drug use: No    Sexual activity: Yes     Partners: Male   Social History Narrative    The patient is  who lives alone and works as a .  She has a Masters in Civil Engineering.     Social Drivers of Health     Financial Resource Strain: Low Risk  (2/25/2025)    Overall Financial Resource Strain (CARDIA)     Difficulty of Paying Living Expenses: Not hard at all   Food Insecurity: No Food Insecurity (2/25/2025)    Hunger Vital Sign     Worried About Running Out of Food in the Last Year: Never true     Ran Out of Food in the Last Year: Never true   Transportation Needs: No Transportation Needs (2/25/2025)    PRAPARE - Transportation     Lack of Transportation (Medical): No     Lack of Transportation (Non-Medical): No   Physical Activity: Unknown (2/25/2025)    Exercise Vital Sign     Days of Exercise per Week: 0 days   Stress: No Stress Concern Present (2/25/2025)    [employer] [place] [employer] - Occupational Stress Questionnaire     Feeling of Stress : Not at all   Housing Stability: Low Risk  (2/25/2025)    Housing Stability Vital Sign     Unable to Pay for Housing in the Last Year: No     Homeless in the Last Year: No       Medications Ordered Prior to Encounter[1]    Review of patient's allergies indicates:  No Known Allergies      Review of Systems        Physical Exam  Exam conducted with a chaperone present.   Genitourinary:     Comments: Around 0.1 cm papular lesion with some blood  mons pubis  Neurological:      Mental Status: She is alert.      Assessment:     1. Folliculitis    2.  History of HPV infection        LABS:   Lab Results   Component Value Date    HGBA1C 5.3 02/23/2024    HGBA1C 5.1 05/23/2023      Lab Results   Component Value Date    CHOL 188 02/23/2024    CHOL 201 (H) 05/23/2023    CHOL 176 07/31/2021     Lab Results   Component Value Date    LDLCALC 109.2 02/23/2024    LDLCALC 117.6 05/23/2023    LDLCALC 104.0 07/31/2021     Lab Results   Component Value Date    WBC 6.85 02/23/2024    HGB 12.3 02/23/2024    HCT 37.9 02/23/2024     02/23/2024    CHOL 188 02/23/2024    TRIG 39 02/23/2024    HDL 71 02/23/2024    ALT 33 05/24/2024    AST 23 05/24/2024     05/24/2024    K 4.5 05/24/2024     05/24/2024    CREATININE 0.9 05/24/2024    BUN 14 05/24/2024    CO2 23 05/24/2024    TSH 0.616 02/23/2024    HGBA1C 5.3 02/23/2024       Plan:   Shasta was seen today for female gu problem.    Diagnoses and all orders for this visit:    Folliculitis  -     mupirocin (BACTROBAN) 2 % ointment; Apply topically 3 (three) times daily.    History of HPV infection        Assessment & Plan    IMPRESSION:  - Assessed painful bump on external genitalia, likely folliculitis rather than HPV-related wart based on appearance and symptoms  - Reviewed patient's history of HPV and normal Pap smears, noting recent negative HPV test in 2021 suggests immune system clearance  - Considered differential diagnosis including ingrown hair, given patient's shaving habits  - Determined topical cream appropriate for treating suspected folliculitis    FOLLICULITIS:  - Noted the patient's report of blood associated with the bump on the exterior of the vagina and on underwear.  - Examined the area and observed it appears better than anticipated.  - Assessed the lesion as painful and indicative of folliculitis rather than a wart.  - Prescribed a topical cream for treatment.  - Advised the patient to avoid shaving the affected area temporarily.  - Instructed the patient to follow up if pain intensifies or  lesion size changes.  - Examined the painful bump on the exterior of the vagina that has not healed as expected post-menstrual cycle.  - Diagnosed the condition as folliculitis based on the lesion's characteristics: painful, blood discharge, small bump associated with a hair follicle.  - Differentiated the appearance of typical genital warts versus folliculitis.  - Prescribed a topical cream for application to the affected area.  - Instructed the patient to avoid shaving the affected area until resolved.  - Advised the patient to contact the office if pain increases or lesion grows in size.    HPV HISTORY:  - Reviewed the patient's history of HPV diagnosis in 2006 and regular Pap smears since then.  - Confirmed no current HPV infection based on recent Pap smear results from 2021.  - Explained the difference between cervical HPV and external genital warts.  - Discussed HPV clearance by the immune system and the importance of regular Pap smears.  - Clarified that the recent negative HPV test suggests low risk for recurrence.  - Educated the patient on the prevalence and transmission of HPV.  - Recommend continued regular Pap smears to monitor for any recurrence of HPV.    HERPES HISTORY:  - Noted the patient's history of herpes diagnosis with no reported outbreaks.    FOLLOW UP:  - Recommend follow-up if painless bumps or lesions develop in the future.           Face to Face time with patient:  9:00 AM CST -      Each patient to whom he or she provides medical services by telemedicine is:  (1) informed of the relationship between the physician and patient and the respective role of any other health care provider with respect to management of the patient; and (2) notified that he or she may decline to receive medical services by telemedicine and may withdraw from such care at any time.    I spent a total of   32    minutes face to face and non-face to face on the date of this visit.This includes time preparing to see  the patient (eg, review of tests, notes), obtaining and/or reviewing additional history from an independent historian and/or outside medical records, documenting clinical information in the electronic health record, independently interpreting results and/or communicating results to the patient/family/caregiver, or care coordinator.  Visit today included increased complexity associated with the care of the episodic problem addressed and managing the longitudinal care of the patient due to the serious and/or complex managed problem(s).    There are no Patient Instructions on file for this visit.    No follow-ups on file.  The 10-year ASCVD risk score (Roldan CEJA, et al., 2019) is: 0.9%    Values used to calculate the score:      Age: 44 years      Sex: Female      Is Non- : Yes      Diabetic: No      Tobacco smoker: No      Systolic Blood Pressure: 137 mmHg      Is BP treated: No      HDL Cholesterol: 71 mg/dL      Total Cholesterol: 188 mg/dL    This note was generated with the assistance of ambient listening technology. Verbal consent was obtained by the patient and accompanying visitor(s) for the recording of patient appointment to facilitate this note. I attest to having reviewed and edited the generated note for accuracy, though some syntax or spelling errors may persist. Please contact the author of this note for any clarification.         [1]   Current Outpatient Medications on File Prior to Visit   Medication Sig Dispense Refill    ALPRAZolam (XANAX) 0.25 MG tablet TAKE 1-2 TABLETS BY MOUTH 3 TIMES DAILY AS NEEDED FOR ANXIETY. 30 tablet 3    clobetasoL (CLOBEX) 0.05 % shampoo Apply topically.      dextroamphetamine-amphetamine (ADDERALL) 7.5 mg Tab Take 1 tablet by mouth once daily. 30 tablet 0    fluticasone propionate (FLONASE) 50 mcg/actuation nasal spray USE 2 SPRAYS IN EACH NOSTRIL DAILY AT 6 AM 16 mL 3    furosemide (LASIX) 20 MG tablet TAKE 1 TABLET BY MOUTH DAILY AS NEEDED FOR  LEG SWELLING 20 tablet 0    HYDROcodone-acetaminophen (NORCO)  mg per tablet Take 1 tablet by mouth 4 (four) times daily as needed.      ibuprofen (ADVIL,MOTRIN) 800 MG tablet Take 1 tablet (800 mg total) by mouth every 8 (eight) hours. 30 tablet 0    levocetirizine (XYZAL) 5 MG tablet Take 1 tablet (5 mg total) by mouth every evening. 90 tablet 1    metoprolol succinate (TOPROL-XL) 25 MG 24 hr tablet TAKE 1 TABLET BY MOUTH DAILY AS NEEDED. 90 tablet 1    multivitamin capsule Take 1 capsule by mouth once daily.      RETIN-A MICRO PUMP 0.06 % GlwP Apply 1 application topically nightly.      valACYclovir (VALTREX) 500 MG tablet Take 1 tablet (500 mg total) by mouth once daily. 90 tablet 3    zolpidem (AMBIEN) 5 MG Tab TAKE 1 TABLET BY MOUTH EVERY DAY AT NIGHT AS NEEDED FOR SLEEP 30 tablet 5    12 HOUR DECONGESTANT 120 mg 12 hr tablet TAKE 1 TABLET BY MOUTH TWICE A DAY 60 tablet 5    benzonatate (TESSALON) 200 MG capsule Take 1 capsule (200 mg total) by mouth 3 (three) times daily as needed for Cough. 30 capsule 0    buPROPion (WELLBUTRIN) 100 MG tablet Take 1 tablet (100 mg total) by mouth once daily. 30 tablet 5    docusate sodium (COLACE) 100 MG capsule Take 1 capsule (100 mg total) by mouth 2 (two) times daily. 30 capsule 0    polyethylene glycol (GLYCOLAX) 17 gram PwPk Take 17 g by mouth daily as needed.  0     No current facility-administered medications on file prior to visit.

## 2025-03-10 DIAGNOSIS — F51.01 PRIMARY INSOMNIA: Primary | ICD-10-CM

## 2025-03-10 RX ORDER — ZOLPIDEM TARTRATE 5 MG/1
TABLET ORAL
Qty: 30 TABLET | Refills: 5 | Status: SHIPPED | OUTPATIENT
Start: 2025-03-10

## 2025-03-10 NOTE — TELEPHONE ENCOUNTER
Lov: 2/25/2025    I will leave the country very early Wednesday morning if there is anyway you can fill this prescription today. It would help make sure I have it for my trip. Thank you so much for your help and I hope you have a great day.

## 2025-03-10 NOTE — TELEPHONE ENCOUNTER
No care due was identified.  [facility] Embedded Care Due Messages. Reference number: [id number].   3/10/2025 2:03:50 PM CDT

## 2025-04-07 RX ORDER — FLUTICASONE PROPIONATE 50 MCG
SPRAY, SUSPENSION (ML) NASAL
Qty: 16 ML | Refills: 11 | Status: SHIPPED | OUTPATIENT
Start: 2025-04-07

## 2025-04-07 NOTE — TELEPHONE ENCOUNTER
No care due was identified.  [facility] Embedded Care Due Messages. Reference number: [id number].   4/07/2025 12:01:30 AM CDT

## 2025-04-07 NOTE — TELEPHONE ENCOUNTER
Refill Decision Note   Shasta Rivera  is requesting a refill authorization.  Brief Assessment and Rationale for Refill:  Approve     Medication Therapy Plan:        Comments:     Note composed:6:31 AM 04/07/2025

## 2025-04-08 ENCOUNTER — PATIENT MESSAGE (OUTPATIENT)
Dept: FAMILY MEDICINE | Facility: CLINIC | Age: 45
End: 2025-04-08
Payer: COMMERCIAL

## 2025-04-08 DIAGNOSIS — F90.0 ATTENTION DEFICIT HYPERACTIVITY DISORDER (ADHD), PREDOMINANTLY INATTENTIVE TYPE: ICD-10-CM

## 2025-04-08 RX ORDER — DEXTROAMPHETAMINE SACCHARATE, AMPHETAMINE ASPARTATE, DEXTROAMPHETAMINE SULFATE AND AMPHETAMINE SULFATE 1.875; 1.875; 1.875; 1.875 MG/1; MG/1; MG/1; MG/1
1 TABLET ORAL DAILY
Qty: 30 TABLET | Refills: 0 | Status: SHIPPED | OUTPATIENT
Start: 2025-04-08 | End: 2025-04-09 | Stop reason: SDUPTHER

## 2025-04-08 NOTE — TELEPHONE ENCOUNTER
They are out of the meds at the [facility] are you able to send it to [facility] please? They just confirmed it is there. Sorry for the change thanks for all your help   Pt sent above portal msg and needs her med recent to the [facility] on [address]. Please advise.

## 2025-04-08 NOTE — TELEPHONE ENCOUNTER
Pt is asking for update on refill request for dextroamphetamine-amphetamine (ADDERALL) 7.5 mg. Refill request was sent on 4/4 but has not yet been approved or denied. Please advise. Pt states she only has one pill left.

## 2025-04-09 RX ORDER — DEXTROAMPHETAMINE SACCHARATE, AMPHETAMINE ASPARTATE, DEXTROAMPHETAMINE SULFATE AND AMPHETAMINE SULFATE 1.875; 1.875; 1.875; 1.875 MG/1; MG/1; MG/1; MG/1
1 TABLET ORAL DAILY
Qty: 30 TABLET | Refills: 0 | Status: SHIPPED | OUTPATIENT
Start: 2025-04-09

## 2025-05-05 DIAGNOSIS — F90.0 ATTENTION DEFICIT HYPERACTIVITY DISORDER (ADHD), PREDOMINANTLY INATTENTIVE TYPE: ICD-10-CM

## 2025-05-05 RX ORDER — DEXTROAMPHETAMINE SACCHARATE, AMPHETAMINE ASPARTATE, DEXTROAMPHETAMINE SULFATE AND AMPHETAMINE SULFATE 1.875; 1.875; 1.875; 1.875 MG/1; MG/1; MG/1; MG/1
1 TABLET ORAL DAILY
Qty: 30 TABLET | Refills: 0 | OUTPATIENT
Start: 2025-05-05

## 2025-05-05 NOTE — TELEPHONE ENCOUNTER
No care due was identified.  [facility] Embedded Care Due Messages. Reference number: [id number].   5/05/2025 6:35:45 AM CDT

## 2025-05-05 NOTE — TELEPHONE ENCOUNTER
Please see the attached refill request.    Good morning,  I am finding that it stops working after lunch.  I was wondering if i need to take it twice a day.  Extended reslease may also be a solution.  thanks in advance for maria eugenia castaneda

## 2025-05-06 RX ORDER — DEXTROAMPHETAMINE SACCHARATE, AMPHETAMINE ASPARTATE, DEXTROAMPHETAMINE SULFATE AND AMPHETAMINE SULFATE 2.5; 2.5; 2.5; 2.5 MG/1; MG/1; MG/1; MG/1
1 TABLET ORAL EVERY MORNING
Qty: 30 TABLET | Refills: 0 | Status: SHIPPED | OUTPATIENT
Start: 2025-05-06

## 2025-05-26 DIAGNOSIS — F90.0 ATTENTION DEFICIT HYPERACTIVITY DISORDER (ADHD), PREDOMINANTLY INATTENTIVE TYPE: ICD-10-CM

## 2025-05-26 RX ORDER — DEXTROAMPHETAMINE SACCHARATE, AMPHETAMINE ASPARTATE, DEXTROAMPHETAMINE SULFATE AND AMPHETAMINE SULFATE 2.5; 2.5; 2.5; 2.5 MG/1; MG/1; MG/1; MG/1
1 TABLET ORAL EVERY MORNING
Qty: 30 TABLET | Refills: 0 | Status: SHIPPED | OUTPATIENT
Start: 2025-05-26

## 2025-05-26 NOTE — TELEPHONE ENCOUNTER
Care Due:                  Date            Visit Type   Department     Provider  --------------------------------------------------------------------------------                                ESTABLISHED                              PATIENT -    [facility]  Last Visit: 02-      Spokeable      MEDICINE       Mary Redmond  Next Visit: None Scheduled  None         None Found                                                            Last  Test          Frequency    Reason                     Performed    Due Date  --------------------------------------------------------------------------------    Cr..........  12 months..  valACYclovir.............  05- 05-    Health [facility] Embedded Care Due Messages. Reference number: [id number].   5/26/2025 11:07:53 AM CDT

## 2025-06-11 ENCOUNTER — OFFICE VISIT (OUTPATIENT)
Dept: ALLERGY | Facility: CLINIC | Age: 45
End: 2025-06-11
Payer: COMMERCIAL

## 2025-06-11 ENCOUNTER — OFFICE VISIT (OUTPATIENT)
Dept: PRIMARY CARE CLINIC | Facility: CLINIC | Age: 45
End: 2025-06-11
Payer: COMMERCIAL

## 2025-06-11 VITALS
BODY MASS INDEX: 25.9 KG/M2 | OXYGEN SATURATION: 98 % | SYSTOLIC BLOOD PRESSURE: 110 MMHG | HEART RATE: 74 BPM | WEIGHT: 165.38 LBS | TEMPERATURE: 98 F | DIASTOLIC BLOOD PRESSURE: 74 MMHG

## 2025-06-11 VITALS
HEART RATE: 81 BPM | DIASTOLIC BLOOD PRESSURE: 74 MMHG | BODY MASS INDEX: 25.81 KG/M2 | HEIGHT: 67 IN | WEIGHT: 164.44 LBS | OXYGEN SATURATION: 100 % | TEMPERATURE: 99 F | SYSTOLIC BLOOD PRESSURE: 115 MMHG

## 2025-06-11 DIAGNOSIS — L50.9 HIVES OF UNKNOWN ORIGIN: Primary | ICD-10-CM

## 2025-06-11 DIAGNOSIS — L28.2 PRURITIC RASH: ICD-10-CM

## 2025-06-11 DIAGNOSIS — Z3A.01 LESS THAN 8 WEEKS GESTATION OF PREGNANCY: Primary | ICD-10-CM

## 2025-06-11 DIAGNOSIS — L50.9 HIVES OF UNKNOWN ORIGIN: ICD-10-CM

## 2025-06-11 PROCEDURE — 3074F SYST BP LT 130 MM HG: CPT | Mod: CPTII,S$GLB,, | Performed by: STUDENT IN AN ORGANIZED HEALTH CARE EDUCATION/TRAINING PROGRAM

## 2025-06-11 PROCEDURE — 3008F BODY MASS INDEX DOCD: CPT | Mod: CPTII,S$GLB,, | Performed by: STUDENT IN AN ORGANIZED HEALTH CARE EDUCATION/TRAINING PROGRAM

## 2025-06-11 PROCEDURE — 3078F DIAST BP <80 MM HG: CPT | Mod: CPTII,S$GLB,, | Performed by: STUDENT IN AN ORGANIZED HEALTH CARE EDUCATION/TRAINING PROGRAM

## 2025-06-11 PROCEDURE — 1160F RVW MEDS BY RX/DR IN RCRD: CPT | Mod: CPTII,S$GLB,, | Performed by: STUDENT IN AN ORGANIZED HEALTH CARE EDUCATION/TRAINING PROGRAM

## 2025-06-11 PROCEDURE — 99999 PR PBB SHADOW E&M-EST. PATIENT-LVL V: CPT | Mod: PBBFAC,,, | Performed by: NURSE PRACTITIONER

## 2025-06-11 PROCEDURE — 1159F MED LIST DOCD IN RCRD: CPT | Mod: CPTII,S$GLB,, | Performed by: STUDENT IN AN ORGANIZED HEALTH CARE EDUCATION/TRAINING PROGRAM

## 2025-06-11 PROCEDURE — 99204 OFFICE O/P NEW MOD 45 MIN: CPT | Mod: S$GLB,,, | Performed by: STUDENT IN AN ORGANIZED HEALTH CARE EDUCATION/TRAINING PROGRAM

## 2025-06-11 PROCEDURE — 99999 PR PBB SHADOW E&M-EST. PATIENT-LVL III: CPT | Mod: PBBFAC,,, | Performed by: STUDENT IN AN ORGANIZED HEALTH CARE EDUCATION/TRAINING PROGRAM

## 2025-06-11 RX ORDER — PROGESTERONE 50 MG/ML
50 INJECTION, SOLUTION INTRAMUSCULAR
COMMUNITY
Start: 2025-06-06

## 2025-06-11 RX ORDER — CETIRIZINE HYDROCHLORIDE 10 MG/1
20 TABLET ORAL
Status: SHIPPED | OUTPATIENT
Start: 2025-06-11

## 2025-06-11 RX ORDER — ESTRADIOL 0.1 MG/D
PATCH, EXTENDED RELEASE TRANSDERMAL
COMMUNITY
Start: 2025-06-06

## 2025-06-11 RX ORDER — ESTRADIOL 2 MG/1
2 TABLET ORAL 3 TIMES DAILY
COMMUNITY
Start: 2025-05-01

## 2025-06-11 RX ORDER — CETIRIZINE HYDROCHLORIDE 10 MG/1
10 TABLET ORAL DAILY
Qty: 90 TABLET | Refills: 3 | Status: SHIPPED | OUTPATIENT
Start: 2025-06-11 | End: 2026-06-11

## 2025-06-11 NOTE — PROGRESS NOTES
Patient ID: Shasta Rivera is a 45 y.o. female.    Chief Complaint: Allergic Reaction    History of Present Illness    Ms. Rivera presents today for evaluation of hives    She developed hives this morning that are progressively worsening, located under her arms, on her thighs, and forehead. She describes a sensation of being bitten while at work. She also reports eye irritation and itching that persisted despite rinsing and replacing her contacts. This is her first time experiencing these symptoms.    She consumed eggs from a new batch today, which was her only food intake today. The previous night, she had a chicken sandwich with fries for dinner. She denies any recent fast food consumption or unusual dietary changes.    She is currently pregnant and denies any known allergies.    She takes Allegra 50 mg and prenatal vitamins.      ROS:  General: -fever, -chills, -fatigue, -weight gain, -weight loss  Eyes: -vision changes, -redness, -discharge, +eye irritation, +eye itchiness  ENT: -ear pain, -nasal congestion, -sore throat  Cardiovascular: -chest pain, -palpitations, -lower extremity edema  Respiratory: -cough, -shortness of breath  Gastrointestinal: -abdominal pain, -nausea, -vomiting, -diarrhea, -constipation, -blood in stool  Genitourinary: -dysuria, -hematuria, -frequency  Musculoskeletal: -joint pain, -muscle pain  Skin: -rash, -lesion, +hives, +urticaria/ hives, +insect bites  Neurological: -headache, -dizziness, -numbness, -tingling  Psychiatric: -anxiety, -depression, -sleep difficulty         Wt Readings from Last 10 Encounters:   06/11/25 74.6 kg (164 lb 7.4 oz)   06/11/25 75 kg (165 lb 5.5 oz)   03/07/25 71.9 kg (158 lb 8.2 oz)   12/23/24 69.9 kg (154 lb)   12/23/24 70.7 kg (155 lb 13.8 oz)   12/11/24 70.1 kg (154 lb 6.9 oz)   11/21/24 70.8 kg (156 lb)   11/14/24 71 kg (156 lb 8.4 oz)   11/13/24 71.2 kg (157 lb)   10/08/24 77 kg (169 lb 12.1 oz)       The 10-year ASCVD risk score ([place] DK, et al.,  2019) is: 0.5%    Values used to calculate the score:      Age: 45 years      Sex: Female      Is Non- : Yes      Diabetic: No      Tobacco smoker: No      Systolic Blood Pressure: 115 mmHg      Is BP treated: No      HDL Cholesterol: 71 mg/dL      Total Cholesterol: 188 mg/dL    PAST MEDICAL HISTORY:  Past Medical History:   Diagnosis Date    Abnormal cervical Pap smear with positive HPV DNA test     Allergic rhinitis     Anemia     Herpes simplex type 2 infection     Onychomycosis     SAMIRA on CPAP     DOES NOT USE    Raynaud's phenomenon     Uterine fibroid        PAST SURGICAL HISTORY:  Past Surgical History:   Procedure Laterality Date    COLONOSCOPY N/A 7/12/2024    Procedure: COLONOSCOPY;  Surgeon: Juliann Beltre MD;  Location: [facility];  Service: Gastroenterology;  Laterality: N/A;    DILATION AND CURETTAGE OF UTERUS      for EAB    LUMBAR EPIDURAL INJECTION      MYOMECTOMY N/A 10/11/2021    Procedure: MYOMECTOMY;  Surgeon: Carol Ann Casarez MD;  [facility];  Service: OB/GYN;  Laterality: N/A;       SOCIAL HISTORY:  Social History[1]    FAMILY HISTORY:  Family History   Problem Relation Name Age of Onset    Diabetes Mother      Hypertension Mother      Hyperlipidemia Mother      Colon cancer Father      Hyperlipidemia Father      Prostate cancer Father      Breast cancer Maternal Grandmother      Cancer Maternal Grandmother      Colon cancer Maternal Grandmother      Stroke Maternal Grandfather      Breast cancer Paternal Grandmother      Diabetes Paternal Grandmother      Hypertension Paternal Grandmother      Uterine cancer Paternal Grandmother      Sleep apnea Brother      Breast cancer Other Great Aunt        ALLERGIES AND MEDICATIONS: updated and reviewed.  Review of patient's allergies indicates:  No Known Allergies  Current Medications[2]      Physical Exam  Constitutional:       Appearance: Normal appearance. She is normal weight.   HENT:      Head: Normocephalic  and atraumatic.      Right Ear: Tympanic membrane normal.      Left Ear: Tympanic membrane normal.      Nose: Nose normal.      Mouth/Throat:      Mouth: Mucous membranes are moist.   Cardiovascular:      Rate and Rhythm: Normal rate.      Pulses: Normal pulses.   Pulmonary:      Effort: Pulmonary effort is normal.   Abdominal:      General: Bowel sounds are normal.   Musculoskeletal:         General: Normal range of motion.      Cervical back: Normal range of motion.   Skin:     General: Skin is dry.      Capillary Refill: Capillary refill takes less than 2 seconds.      Findings: Rash present. Rash is macular, papular and urticarial.   Neurological:      Mental Status: She is alert and oriented to person, place, and time.   Psychiatric:         Mood and Affect: Mood is anxious.          Assessment:   LABS:   Lab Results   Component Value Date    HGBA1C 5.3 02/23/2024    HGBA1C 5.1 05/23/2023      Lab Results   Component Value Date    CHOL 188 02/23/2024    CHOL 201 (H) 05/23/2023    CHOL 176 07/31/2021     Lab Results   Component Value Date    LDLCALC 109.2 02/23/2024    LDLCALC 117.6 05/23/2023    LDLCALC 104.0 07/31/2021     Lab Results   Component Value Date    WBC 6.85 02/23/2024    HGB 12.3 02/23/2024    HCT 37.9 02/23/2024     02/23/2024    CHOL 188 02/23/2024    TRIG 39 02/23/2024    HDL 71 02/23/2024    ALT 33 05/24/2024    AST 23 05/24/2024     05/24/2024    K 4.5 05/24/2024     05/24/2024    CREATININE 0.9 05/24/2024    BUN 14 05/24/2024    CO2 23 05/24/2024    TSH 0.616 02/23/2024    HGBA1C 5.3 02/23/2024       Plan:   Shasta was seen today for allergic reaction.    Diagnoses and all orders for this visit:    Hives of unknown origin  -     Ambulatory referral/consult to Allergy; Future    Pruritic rash  -     Ambulatory referral/consult to Allergy; Future        Assessment & Plan      IMPRESSION:  -Suspect allergic reaction presenting with diffuse hives and eye  irritation.  -Potential triggers including recent dietary changes, new clothing, or environmental exposures.  -Ruled out respiratory involvement.  -Limited treatment options due to pregnancy status.    URTICARIA (HIVES):  - Examined the patient and found diffuse hives under arms, on thighs, and forehead with no respiratory symptoms.  - Discussed potential causes of hives, including food and environmental factors.  - Recommend topical diphenhydramine and topical steroids for treatment.    ALLERGIC REACTION:  - Referred the patient to Dr. Dumont (allergist) for immediate evaluation and treatment of suspected allergic reaction.  - Informed the patient that appointment will populate in patient's MyChart.  - Noted the patient has no known allergies and is pregnant.    ALLERGIC DERMATITIS OF RIGHT EYE:  - Ms. Rivera reports irritation and itching in the right eye, with a bump on the side of the eye, and feeling of being bitten.  - Examination confirmed irritation and bump on the right eye.  - Noted no new medications or changes in personal care products.  - Discussed potential environmental factors as possible causes.  - This condition will also be evaluated during the scheduled allergist appointment.       FOLLOW-UP:  - Referred to Allergy for work-up.    I spent a total of 21 minutes face to face and non-face to face on the date of this visit.This includes time preparing to see the patient (eg, review of tests, notes), obtaining and/or reviewing additional history from an independent historian and/or outside medical records, documenting clinical information in the electronic health record, independently interpreting results and/or communicating results to the patient/family/caregiver, or care coordinator.  Visit today included increased complexity associated with the care of the episodic problem addressed and managing the longitudinal care of the patient due to the serious and/or complex managed problem(s).      This  note was generated with the assistance of ambient listening technology. Verbal consent was obtained by the patient and accompanying visitor(s) for the recording of patient appointment to facilitate this note. I attest to having reviewed and edited the generated note for accuracy, though some syntax or spelling errors may persist. Please contact the author of this note for any clarification.        Sincerely,  Gallo Bonilla NP          [1]   Social History  Socioeconomic History    Marital status:    Occupational History     Employer: [employer]   Tobacco Use    Smoking status: Never    Smokeless tobacco: Never   Substance and Sexual Activity    Alcohol use: Yes     Alcohol/week: 7.0 standard drinks of alcohol     Types: 7 Glasses of wine per week    Drug use: No    Sexual activity: Yes     Partners: Male   Social History Narrative    The patient is  who lives alone and works as a .  She has a Masters in Civil Engineering.     Social Drivers of Health     Financial Resource Strain: Low Risk  (2/25/2025)    Overall Financial Resource Strain (CARDIA)     Difficulty of Paying Living Expenses: Not hard at all   Food Insecurity: No Food Insecurity (2/25/2025)    Hunger Vital Sign     Worried About Running Out of Food in the Last Year: Never true     Ran Out of Food in the Last Year: Never true   Transportation Needs: No Transportation Needs (2/25/2025)    PRAPARE - Transportation     Lack of Transportation (Medical): No     Lack of Transportation (Non-Medical): No   Physical Activity: Unknown (2/25/2025)    Exercise Vital Sign     Days of Exercise per Week: 0 days   Stress: No Stress Concern Present (2/25/2025)    [employer] - Occupational Stress Questionnaire     Feeling of Stress : Not at all   Housing Stability: Low Risk  (2/25/2025)    Housing Stability Vital Sign     Unable to Pay for Housing in the Last Year: No     Homeless in the Last Year: No   [2]   Current  Outpatient Medications   Medication Sig Dispense Refill    ALPRAZolam (XANAX) 0.25 MG tablet TAKE 1-2 TABLETS BY MOUTH 3 TIMES DAILY AS NEEDED FOR ANXIETY. 30 tablet 3    benzonatate (TESSALON) 200 MG capsule Take 1 capsule (200 mg total) by mouth 3 (three) times daily as needed for Cough. 30 capsule 0    buPROPion (WELLBUTRIN) 100 MG tablet Take 1 tablet (100 mg total) by mouth once daily. 30 tablet 5    cetirizine (ZYRTEC) 10 MG tablet Take 1 tablet (10 mg total) by mouth once daily. 90 tablet 3    clobetasoL (CLOBEX) 0.05 % shampoo Apply topically.      dextroamphetamine-amphetamine 10 mg Tab Take 1 tablet (10 mg total) by mouth every morning. 30 tablet 0    docusate sodium (COLACE) 100 MG capsule Take 1 capsule (100 mg total) by mouth 2 (two) times daily. 30 capsule 0    estradioL (ESTRACE) 2 MG tablet Take 2 mg by mouth 3 (three) times daily.      fluticasone propionate (FLONASE) 50 mcg/actuation nasal spray USE 2 SPRAYS IN EACH NOSTRIL DAILY AT 6 AM 16 mL 11    furosemide (LASIX) 20 MG tablet TAKE 1 TABLET BY MOUTH DAILY AS NEEDED FOR LEG SWELLING 20 tablet 0    HYDROcodone-acetaminophen (NORCO)  mg per tablet Take 1 tablet by mouth 4 (four) times daily as needed.      levocetirizine (XYZAL) 5 MG tablet Take 1 tablet (5 mg total) by mouth every evening. 90 tablet 1    LYLLANA 0.1 mg/24 hr PTSW SMARTSI Patch(s) Topical Every 3 Days      metoprolol succinate (TOPROL-XL) 25 MG 24 hr tablet TAKE 1 TABLET BY MOUTH DAILY AS NEEDED. 90 tablet 1    multivitamin capsule Take 1 capsule by mouth once daily.      mupirocin (BACTROBAN) 2 % ointment Apply topically 3 (three) times daily. 30 g 1    polyethylene glycol (GLYCOLAX) 17 gram PwPk Take 17 g by mouth daily as needed.  0    progesterone (PROGESTERONE IN OIL) 50 mg/mL injection Inject 50 mg into the muscle.      valACYclovir (VALTREX) 500 MG tablet Take 1 tablet (500 mg total) by mouth once daily. 90 tablet 3    zolpidem (AMBIEN) 5 MG Tab TAKE 1 TABLET BY  MOUTH EVERY DAY AT NIGHT AS NEEDED FOR SLEEP 30 tablet 5     Current Facility-Administered Medications   Medication Dose Route Frequency Provider Last Rate Last Admin    cetirizine tablet 20 mg  20 mg Oral 1 time in Clinic/HOD

## 2025-06-11 NOTE — PROGRESS NOTES
Allergy and Immunology  New Patient Clinic Note    Date: 6/11/2025  Chief Complaint   Patient presents with    Urticaria     HIVES        [place]: [place]  [id number] [place] [address]    History  Shasta Rivera is a 45 y.o. female being seen as a New Patient today.    Acute urticaria  Dermatographism   Pregnancy less than 8 weeks  Patient presented to this appointment with diffuse urticaria and angioedema around right eye   Etiology may be hormone driven versus viral versus stress   Patient is early in pregnancy, approximately 4 weeks  Date of implantation is known as patient is IVF patient   Treating only with high-dose antihistamines at this time   Spoke with patient's fertility doctor about the potential of using prednisone and or intramuscular methylprednisolone  After shared decision-making we will proceed with only oral antihistamines at this time  Patient is a high-risk pregnancy due to age   Patient is leaving for the [place] tomorrow    Allergies, PMH, PSH, Social, and Family History were reviewed.    Review of patient's allergies indicates:  No Known Allergies   Past Medical History:   Diagnosis Date    Abnormal cervical Pap smear with positive HPV DNA test     Allergic rhinitis     Anemia     Herpes simplex type 2 infection     Onychomycosis     SAMIRA on CPAP     DOES NOT USE    Raynaud's phenomenon     Uterine fibroid      Past Surgical History:   Procedure Laterality Date    COLONOSCOPY N/A 7/12/2024    Procedure: COLONOSCOPY;  Surgeon: Juliann Beltre MD;  Location: [facility];  Service: Gastroenterology;  Laterality: N/A;    DILATION AND CURETTAGE OF UTERUS      for EAB    LUMBAR EPIDURAL INJECTION      MYOMECTOMY N/A 10/11/2021    Procedure: MYOMECTOMY;  Surgeon: Carol Ann Casarez MD;  Location: [facility] OR;  Service: OB/GYN;  Laterality: N/A;     Social History     Social History Narrative    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     S/he reports that she has never smoked. She has never used smokeless tobacco. She reports current alcohol use of about 7.0 standard drinks of alcohol per week. She reports that she does not use drugs.    Medications Ordered Prior to Encounter[1]    Physical Examination  Vitals:    06/11/25 1452   BP: 115/74   Pulse: 81   Temp: 98.7 °F (37.1 °C)     GENERAL:  female in no apparent distress and well developed and well nourished  HEAD:  Normocephalic, without obvious abnormality, atraumatic  EYES: sclera anicteric, conjunctiva normochromic  EARS: normal TM's and external ear canals both ears  NOSE: without erythema or discharge, clear discharge, turbinates normal    OROPHARYNX: moist mucous membranes without erythema, exudates or petechiae  LYMPH NODES: normal, supple, no lymphadenopathy  LUNGS: clear to auscultation, no wheezes, rales or rhonchi, symmetric air entry.  HEART: normal rate, regular rhythm, normal S1, S2, no murmurs, rubs, clicks or gallops.  ABDOMEN: soft, nontender, nondistended, no masses or organomegaly.  MUSCULOSKELETAL: no gross joint deformity or swelling.  NEURO: alert, oriented, normal speech, no focal findings or movement disorder noted.  SKIN: Diffuse urticaria - right periocular edema.  Dermatographism positive.      Assessment/Plan:   Problem List Items Addressed This Visit    None  Visit Diagnoses         Less than 8 weeks gestation of pregnancy    -  Primary      Hives of unknown origin          Pruritic rash              Acute urticaria   Dermatographism   Unlikely due to an acute allergic reaction   Educated on the condition   High-dose antihistamines   Return to clinic in a few weeks for blood testing   Holding off on steroids due to shared decision-making with fertility doctor   High-risk pregnancy early in pregnancy-age greater than 35 years  ED precautions discussed at length    Follow up:  No follow-ups on file.    DISCLAIMER:  This note was prepared with [employer] voice recognition transcription software. Garbled syntax, mangled pronouns, and other bizarre constructions may be attributed to that software system. While efforts were made to correct any mistakes made by this voice recognition program, some errors and/or omissions may remain in the note that were missed when the note was originally created.     Brayan Lambert MD   [facility]  Allergy and Immunology        [1]   Current Outpatient Medications on File Prior to Visit   Medication Sig Dispense Refill    ALPRAZolam (XANAX) 0.25 MG tablet TAKE 1-2 TABLETS BY MOUTH 3 TIMES DAILY AS NEEDED FOR ANXIETY. 30 tablet 3    benzonatate (TESSALON) 200 MG capsule Take 1 capsule (200 mg total) by mouth 3 (three) times daily as needed for Cough. 30 capsule 0    buPROPion (WELLBUTRIN) 100 MG tablet Take 1 tablet (100 mg total) by mouth once daily. 30 tablet 5    clobetasoL (CLOBEX) 0.05 % shampoo Apply topically.      dextroamphetamine-amphetamine 10 mg Tab Take 1 tablet (10 mg total) by mouth every morning. 30 tablet 0    docusate sodium (COLACE) 100 MG capsule Take 1 capsule (100 mg total) by mouth 2 (two) times daily. 30 capsule 0    estradioL (ESTRACE) 2 MG tablet Take 2 mg by mouth 3 (three) times daily.      fluticasone propionate (FLONASE) 50 mcg/actuation nasal spray USE 2 SPRAYS IN EACH NOSTRIL DAILY AT 6 AM 16 mL 11    furosemide (LASIX) 20 MG tablet TAKE 1 TABLET BY MOUTH DAILY AS NEEDED FOR LEG SWELLING 20 tablet 0    HYDROcodone-acetaminophen (NORCO)  mg per tablet Take 1 tablet by mouth 4 (four) times daily as needed.      LYLLANA 0.1 mg/24 hr PTSW SMARTSI Patch(s) Topical Every 3 Days      metoprolol succinate (TOPROL-XL) 25 MG 24 hr tablet TAKE 1 TABLET BY MOUTH DAILY AS NEEDED. 90 tablet 1    multivitamin capsule Take 1 capsule by mouth once daily.      mupirocin (BACTROBAN) 2 % ointment Apply topically 3 (three) times daily. 30 g 1    polyethylene glycol  (GLYCOLAX) 17 gram PwPk Take 17 g by mouth daily as needed.  0    progesterone (PROGESTERONE IN OIL) 50 mg/mL injection Inject 50 mg into the muscle.      valACYclovir (VALTREX) 500 MG tablet Take 1 tablet (500 mg total) by mouth once daily. 90 tablet 3    zolpidem (AMBIEN) 5 MG Tab TAKE 1 TABLET BY MOUTH EVERY DAY AT NIGHT AS NEEDED FOR SLEEP 30 tablet 5    [DISCONTINUED] 12 HOUR DECONGESTANT 120 mg 12 hr tablet TAKE 1 TABLET BY MOUTH TWICE A DAY 60 tablet 5    [DISCONTINUED] ibuprofen (ADVIL,MOTRIN) 800 MG tablet Take 1 tablet (800 mg total) by mouth every 8 (eight) hours. 30 tablet 0    [DISCONTINUED] RETIN-A MICRO PUMP 0.06 % GlwP Apply 1 application topically nightly.      levocetirizine (XYZAL) 5 MG tablet Take 1 tablet (5 mg total) by mouth every evening. 90 tablet 1     No current facility-administered medications on file prior to visit.

## 2025-06-12 ENCOUNTER — PATIENT MESSAGE (OUTPATIENT)
Dept: ALLERGY | Facility: CLINIC | Age: 45
End: 2025-06-12

## 2025-06-12 ENCOUNTER — TELEPHONE (OUTPATIENT)
Dept: ALLERGY | Facility: CLINIC | Age: 45
End: 2025-06-12

## 2025-06-12 ENCOUNTER — OFFICE VISIT (OUTPATIENT)
Dept: ALLERGY | Facility: CLINIC | Age: 45
End: 2025-06-12
Payer: COMMERCIAL

## 2025-06-12 DIAGNOSIS — L50.8 ACUTE URTICARIA: Primary | ICD-10-CM

## 2025-06-12 DIAGNOSIS — L28.2 PRURITIC RASH: ICD-10-CM

## 2025-06-12 PROCEDURE — 1159F MED LIST DOCD IN RCRD: CPT | Mod: CPTII,95,, | Performed by: STUDENT IN AN ORGANIZED HEALTH CARE EDUCATION/TRAINING PROGRAM

## 2025-06-12 PROCEDURE — 1160F RVW MEDS BY RX/DR IN RCRD: CPT | Mod: CPTII,95,, | Performed by: STUDENT IN AN ORGANIZED HEALTH CARE EDUCATION/TRAINING PROGRAM

## 2025-06-12 PROCEDURE — 98006 SYNCH AUDIO-VIDEO EST MOD 30: CPT | Mod: 95,,, | Performed by: STUDENT IN AN ORGANIZED HEALTH CARE EDUCATION/TRAINING PROGRAM

## 2025-06-12 RX ORDER — PREDNISONE 20 MG/1
TABLET ORAL
Qty: 9 TABLET | Refills: 0 | Status: SHIPPED | OUTPATIENT
Start: 2025-06-12 | End: 2025-06-18

## 2025-06-12 RX ORDER — PREDNISONE 20 MG/1
TABLET ORAL
Qty: 9 TABLET | Refills: 0 | Status: CANCELLED | OUTPATIENT
Start: 2025-06-12 | End: 2025-06-18

## 2025-06-14 PROBLEM — L50.8 ACUTE URTICARIA: Status: ACTIVE | Noted: 2025-06-14

## 2025-06-14 PROBLEM — L28.2 PRURITIC RASH: Status: ACTIVE | Noted: 2025-06-14

## 2025-06-14 NOTE — PROGRESS NOTES
The patient location is: [place] - [place]   The chief complaint leading to consultation is: Continued hives    Visit type: audiovisual    Face to Face time with patient: 10  30 minutes of total time spent on the encounter, which includes face to face time and non-face to face time preparing to see the patient (eg, review of tests), Obtaining and/or reviewing separately obtained history, Documenting clinical information in the electronic or other health record, Independently interpreting results (not separately reported) and communicating results to the patient/family/caregiver, or Care coordination (not separately reported).     Each patient to whom he or she provides medical services by telemedicine is:  (1) informed of the relationship between the physician and patient and the respective role of any other health care provider with respect to management of the patient; and (2) notified that he or she may decline to receive medical services by telemedicine and may withdraw from such care at any time.    Allergy and Immunology  Established Patient Clinic Note    Date: 6/14/2025  Chief Complaint   Patient presents with    Urticaria     History  Shasta Rivera is a 45 y.o. female being seen for follow-up today.    Acute urticaria  Patient with worsening flares since yesterday's appointment   Likely due to immune system   And not consistent with an allergic reaction   Does not have systemic symptoms   Risk versus benefit especially with [place] leaving the [place] shared decision-making for oral steroids at this time  Patient to continue high-dose antihistamines as well  If Xolair is needed in the future there is a registry for pregnant patients as well as research and outcomes on Xolair use in pregnancy    Allergies, PMH, PSH, Social, and Family History were reviewed.    Medications Ordered Prior to Encounter[1]    Physical Examination  There were no vitals filed for this visit.  GENERAL:  female in no apparent distress  and well developed and well nourished  HEAD:  Normocephalic, without obvious abnormality, atraumatic  EYES: sclera anicteric, conjunctiva normochromic  LUNGS: no tachypnea, retractions or cyanosis.  HEART: not examined.  ABDOMEN: not examined.  MUSCULOSKELETAL: no gross joint deformity or swelling.  NEURO: not examined.  SKIN: Diffuse Urticaria     Assessment/Plan:   Problem List Items Addressed This Visit       Acute urticaria - Primary    Pruritic rash     Acute urticaria  Angioedema  Pruritic condition  Patient with worsening flares since yesterday's appointment   Likely due to immune system   And not consistent with an allergic reaction   Does not have systemic symptoms   Risk versus benefit especially with [place] leaving the [place] shared decision-making for oral steroids at this time  Patient to continue high-dose antihistamines as well  If Xolair is needed in the future there is a registry for pregnant patients as well as research and outcomes on Xolair use in pregnancy    Follow up:  Follow up in about 3 months (around 9/12/2025), or Per prior plan.    DISCLAIMER: This note was prepared with [employer] voice recognition transcription software. Garbled syntax, mangled pronouns, and other bizarre constructions may be attributed to that software system. While efforts were made to correct any mistakes made by this voice recognition program, some errors and/or omissions may remain in the note that were missed when the note was originally created.     Brayan Lambert MD   [facility]  Allergy and Immunology       [1]   Current Outpatient Medications on File Prior to Visit   Medication Sig Dispense Refill    ALPRAZolam (XANAX) 0.25 MG tablet TAKE 1-2 TABLETS BY MOUTH 3 TIMES DAILY AS NEEDED FOR ANXIETY. 30 tablet 3    benzonatate (TESSALON) 200 MG capsule Take 1 capsule (200 mg total) by mouth 3 (three) times daily as needed for Cough. 30 capsule 0    buPROPion (WELLBUTRIN) 100 MG tablet Take 1 tablet (100  mg total) by mouth once daily. 30 tablet 5    cetirizine (ZYRTEC) 10 MG tablet Take 1 tablet (10 mg total) by mouth once daily. 90 tablet 3    clobetasoL (CLOBEX) 0.05 % shampoo Apply topically.      dextroamphetamine-amphetamine 10 mg Tab Take 1 tablet (10 mg total) by mouth every morning. 30 tablet 0    docusate sodium (COLACE) 100 MG capsule Take 1 capsule (100 mg total) by mouth 2 (two) times daily. 30 capsule 0    estradioL (ESTRACE) 2 MG tablet Take 2 mg by mouth 3 (three) times daily.      fluticasone propionate (FLONASE) 50 mcg/actuation nasal spray USE 2 SPRAYS IN EACH NOSTRIL DAILY AT 6 AM 16 mL 11    furosemide (LASIX) 20 MG tablet TAKE 1 TABLET BY MOUTH DAILY AS NEEDED FOR LEG SWELLING 20 tablet 0    HYDROcodone-acetaminophen (NORCO)  mg per tablet Take 1 tablet by mouth 4 (four) times daily as needed.      levocetirizine (XYZAL) 5 MG tablet Take 1 tablet (5 mg total) by mouth every evening. 90 tablet 1    LYLLANA 0.1 mg/24 hr PTSW SMARTSI Patch(s) Topical Every 3 Days      metoprolol succinate (TOPROL-XL) 25 MG 24 hr tablet TAKE 1 TABLET BY MOUTH DAILY AS NEEDED. 90 tablet 1    multivitamin capsule Take 1 capsule by mouth once daily.      mupirocin (BACTROBAN) 2 % ointment Apply topically 3 (three) times daily. 30 g 1    polyethylene glycol (GLYCOLAX) 17 gram PwPk Take 17 g by mouth daily as needed.  0    progesterone (PROGESTERONE IN OIL) 50 mg/mL injection Inject 50 mg into the muscle.      valACYclovir (VALTREX) 500 MG tablet Take 1 tablet (500 mg total) by mouth once daily. 90 tablet 3    zolpidem (AMBIEN) 5 MG Tab TAKE 1 TABLET BY MOUTH EVERY DAY AT NIGHT AS NEEDED FOR SLEEP 30 tablet 5     Current Facility-Administered Medications on File Prior to Visit   Medication Dose Route Frequency Provider Last Rate Last Admin    cetirizine tablet 20 mg  20 mg Oral 1 time in Clinic/HOD

## 2025-06-15 NOTE — TELEPHONE ENCOUNTER
Care Due:                  Date            Visit Type   Department     Provider  --------------------------------------------------------------------------------                                ESTABLISHED                              PATIENT -    [facility]  Last Visit: 02-      [facility]      MEDICINE       Mary Redmond  Next Visit: None Scheduled  None         None Found                                                            Last  Test          Frequency    Reason                     Performed    Due Date  --------------------------------------------------------------------------------    CBC.........  12 months..  valACYclovir.............  02- 02-    [facility] Embedded Care Due Messages. Reference number: [id number].   6/15/2025 12:20:28 AM CDT

## 2025-06-15 NOTE — TELEPHONE ENCOUNTER
Refill Routing Note   Medication(s) are not appropriate for processing by [facility] for the following reason(s):        Required labs outdated    [facility] action(s):  Defer     Requires labs : Yes             Appointments  past 12m or future 3m with PCP    Date Provider   Last Visit   2/25/2025 Mary Redmond MD   Next Visit   Visit date not found Mary Redmond MD   ED visits in past 90 days: 0        Note composed:4:31 PM 06/15/2025

## 2025-06-16 RX ORDER — VALACYCLOVIR HYDROCHLORIDE 500 MG/1
500 TABLET, FILM COATED ORAL
Qty: 30 TABLET | Refills: 0 | Status: SHIPPED | OUTPATIENT
Start: 2025-06-16

## 2025-06-19 ENCOUNTER — PATIENT MESSAGE (OUTPATIENT)
Dept: SURGERY | Facility: CLINIC | Age: 45
End: 2025-06-19
Payer: COMMERCIAL

## 2025-06-20 ENCOUNTER — TELEPHONE (OUTPATIENT)
Dept: SURGERY | Facility: CLINIC | Age: 45
End: 2025-06-20
Payer: COMMERCIAL

## 2025-06-20 ENCOUNTER — PATIENT MESSAGE (OUTPATIENT)
Dept: SURGERY | Facility: CLINIC | Age: 45
End: 2025-06-20
Payer: COMMERCIAL

## 2025-06-20 NOTE — TELEPHONE ENCOUNTER
Called pt to give time date and location of new MRI and exam with DR. Camacho. Pt verbalized understanding of all things discussed.

## 2025-07-11 ENCOUNTER — TELEPHONE (OUTPATIENT)
Dept: SURGERY | Facility: CLINIC | Age: 45
End: 2025-07-11
Payer: COMMERCIAL

## 2025-07-11 NOTE — TELEPHONE ENCOUNTER
Called pt in response to msg sent asking to get mammo results back over the phone. Let her know staff is gone but the message has been sent to them . Pt verbalized understanding of all things discussed

## 2025-07-17 ENCOUNTER — PATIENT MESSAGE (OUTPATIENT)
Dept: SURGERY | Facility: CLINIC | Age: 45
End: 2025-07-17
Payer: COMMERCIAL

## 2025-07-18 ENCOUNTER — HOSPITAL ENCOUNTER (OUTPATIENT)
Dept: RADIOLOGY | Facility: HOSPITAL | Age: 45
Discharge: HOME | End: 2025-07-18
Attending: SURGERY
Payer: COMMERCIAL

## 2025-07-18 DIAGNOSIS — Z91.89 AT HIGH RISK FOR BREAST CANCER: ICD-10-CM

## 2025-07-20 RX ORDER — VALACYCLOVIR HYDROCHLORIDE 500 MG/1
500 TABLET, FILM COATED ORAL
Qty: 30 TABLET | Refills: 0 | Status: SHIPPED | OUTPATIENT
Start: 2025-07-20

## 2025-07-20 NOTE — TELEPHONE ENCOUNTER
No care due was identified.  [facility] Embedded Care Due Messages. Reference number: [id number].   7/20/2025 12:24:10 AM CDT

## 2025-07-20 NOTE — TELEPHONE ENCOUNTER
Refill Routing Note   Medication(s) are not appropriate for processing by [facility] for the following reason(s):        Required labs outdated    [facility] action(s):  Defer               Appointments  past 12m or future 3m with PCP    Date Provider   Last Visit   2/25/2025 Mary Redmond MD   Next Visit   Visit date not found Mary Redmond MD   ED visits in past 90 days: 0        Note composed:3:52 PM 07/20/2025

## 2025-07-28 ENCOUNTER — PATIENT MESSAGE (OUTPATIENT)
Dept: SURGERY | Facility: CLINIC | Age: 45
End: 2025-07-28
Payer: COMMERCIAL

## 2025-07-31 ENCOUNTER — OFFICE VISIT (OUTPATIENT)
Dept: ALLERGY | Facility: CLINIC | Age: 45
End: 2025-07-31
Payer: COMMERCIAL

## 2025-07-31 VITALS
DIASTOLIC BLOOD PRESSURE: 77 MMHG | WEIGHT: 184.31 LBS | RESPIRATION RATE: 18 BRPM | SYSTOLIC BLOOD PRESSURE: 125 MMHG | BODY MASS INDEX: 29.62 KG/M2 | HEIGHT: 66 IN | OXYGEN SATURATION: 100 % | TEMPERATURE: 98 F | HEART RATE: 96 BPM

## 2025-07-31 DIAGNOSIS — L50.8 ACUTE URTICARIA: Primary | ICD-10-CM

## 2025-07-31 DIAGNOSIS — L28.2 PRURITIC RASH: ICD-10-CM

## 2025-07-31 PROCEDURE — 99999 PR PBB SHADOW E&M-EST. PATIENT-LVL V: CPT | Mod: PBBFAC,,, | Performed by: STUDENT IN AN ORGANIZED HEALTH CARE EDUCATION/TRAINING PROGRAM

## 2025-07-31 RX ORDER — AZELASTINE 1 MG/ML
1 SPRAY, METERED NASAL 2 TIMES DAILY
Qty: 30 ML | Refills: 11 | Status: SHIPPED | OUTPATIENT
Start: 2025-07-31 | End: 2026-07-31

## 2025-07-31 RX ORDER — CETIRIZINE HYDROCHLORIDE 10 MG/1
10 TABLET ORAL DAILY
Qty: 90 TABLET | Refills: 3 | Status: SHIPPED | OUTPATIENT
Start: 2025-07-31 | End: 2026-07-31

## 2025-07-31 RX ORDER — FLUTICASONE PROPIONATE 50 MCG
1 SPRAY, SUSPENSION (ML) NASAL 2 TIMES DAILY
Qty: 16 G | Refills: 11 | Status: SHIPPED | OUTPATIENT
Start: 2025-07-31 | End: 2026-07-31

## 2025-08-01 NOTE — PROGRESS NOTES
Allergy and Immunology  Established Patient Clinic Note    Date: 7/31/2025  Chief Complaint   Patient presents with    Urticaria     History  Shasta Rivera is a 45 y.o. female being seen for follow-up today.    Acute Urticaria   Improvement since oral steroids   Taking Zyrtec 10 mg daily   Educated on condition and possible triggers   Management is complicated by pregnancy     Allergies, PMH, PSH, Social, and Family History were reviewed.    Medications Ordered Prior to Encounter[1]    Physical Examination  Vitals:    07/31/25 1513   BP: 125/77   Pulse: 96   Resp: 18   Temp: 98.4 °F (36.9 °C)     GENERAL:  female in no apparent distress and well developed and well nourished  HEAD:  Normocephalic, without obvious abnormality, atraumatic  EYES: sclera anicteric, conjunctiva normochromic  EARS: normal TM's and external ear canals both ears  NOSE: without erythema or discharge, clear discharge, turbinates normal    OROPHARYNX: moist mucous membranes without erythema, exudates or petechiae  LYMPH NODES: normal, supple, no lymphadenopathy  LUNGS: clear to auscultation, no wheezes, rales or rhonchi, symmetric air entry.  HEART: normal rate, regular rhythm, normal S1, S2, no murmurs, rubs, clicks or gallops.  ABDOMEN: soft, nontender, nondistended, no masses or organomegaly.  MUSCULOSKELETAL: no gross joint deformity or swelling.  NEURO: alert, oriented, normal speech, no focal findings or movement disorder noted.  SKIN: normal coloration and turgor, no rashes, no suspicious skin lesions noted.     Assessment/Plan:   Problem List Items Addressed This Visit       Acute urticaria - Primary    Pruritic rash     Acute Urticaria   Improvement since oral steroids   Taking Zyrtec 10 mg daily   Educated on condition and possible triggers   Management is complicated by pregnancy     Follow up:  Follow up in about 5 months (around 12/31/2025).    DISCLAIMER: This note was prepared with [employer] voice recognition transcription  software. Garbled syntax, mangled pronouns, and other bizarre constructions may be attributed to that software system. While efforts were made to correct any mistakes made by this voice recognition program, some errors and/or omissions may remain in the note that were missed when the note was originally created.     Kesler Bourgoyne, MD [facility]  Allergy and Immunology       [1]   Current Outpatient Medications on File Prior to Visit   Medication Sig Dispense Refill    CHOLINE ORAL Take by mouth.      clobetasoL (CLOBEX) 0.05 % shampoo Apply topically.      fluticasone propionate (FLONASE) 50 mcg/actuation nasal spray USE 2 SPRAYS IN EACH NOSTRIL DAILY AT 6 AM 16 mL 11    multivitamin capsule Take 1 capsule by mouth once daily.      valACYclovir (VALTREX) 500 MG tablet TAKE 1 TABLET BY MOUTH EVERY DAY 30 tablet 0    [DISCONTINUED] cetirizine (ZYRTEC) 10 MG tablet Take 1 tablet (10 mg total) by mouth once daily. 90 tablet 3    ALPRAZolam (XANAX) 0.25 MG tablet TAKE 1-2 TABLETS BY MOUTH 3 TIMES DAILY AS NEEDED FOR ANXIETY. (Patient not taking: Reported on 7/31/2025) 30 tablet 3    benzonatate (TESSALON) 200 MG capsule Take 1 capsule (200 mg total) by mouth 3 (three) times daily as needed for Cough. (Patient not taking: Reported on 7/31/2025) 30 capsule 0    buPROPion (WELLBUTRIN) 100 MG tablet Take 1 tablet (100 mg total) by mouth once daily. (Patient not taking: Reported on 7/31/2025) 30 tablet 5    dextroamphetamine-amphetamine 10 mg Tab Take 1 tablet (10 mg total) by mouth every morning. (Patient not taking: Reported on 7/31/2025) 30 tablet 0    docusate sodium (COLACE) 100 MG capsule Take 1 capsule (100 mg total) by mouth 2 (two) times daily. (Patient not taking: Reported on 7/31/2025) 30 capsule 0    estradioL (ESTRACE) 2 MG tablet Take 2 mg by mouth 3 (three) times daily. (Patient not taking: Reported on 7/31/2025)      furosemide (LASIX) 20 MG tablet TAKE 1 TABLET BY MOUTH DAILY AS NEEDED FOR LEG  SWELLING (Patient not taking: Reported on 2025) 20 tablet 0    HYDROcodone-acetaminophen (NORCO)  mg per tablet Take 1 tablet by mouth 4 (four) times daily as needed. (Patient not taking: Reported on 2025)      levocetirizine (XYZAL) 5 MG tablet Take 1 tablet (5 mg total) by mouth every evening. 90 tablet 1    LYLLANA 0.1 mg/24 hr PTSW SMARTSI Patch(s) Topical Every 3 Days (Patient not taking: Reported on 2025)      metoprolol succinate (TOPROL-XL) 25 MG 24 hr tablet TAKE 1 TABLET BY MOUTH DAILY AS NEEDED. (Patient not taking: Reported on 2025) 90 tablet 1    mupirocin (BACTROBAN) 2 % ointment Apply topically 3 (three) times daily. (Patient not taking: Reported on 2025) 30 g 1    polyethylene glycol (GLYCOLAX) 17 gram PwPk Take 17 g by mouth daily as needed. (Patient not taking: Reported on 2025)  0    progesterone (PROGESTERONE IN OIL) 50 mg/mL injection Inject 50 mg into the muscle. (Patient not taking: Reported on 2025)      zolpidem (AMBIEN) 5 MG Tab TAKE 1 TABLET BY MOUTH EVERY DAY AT NIGHT AS NEEDED FOR SLEEP (Patient not taking: Reported on 2025) 30 tablet 5     Current Facility-Administered Medications on File Prior to Visit   Medication Dose Route Frequency Provider Last Rate Last Admin    cetirizine tablet 20 mg  20 mg Oral 1 time in Clinic/HOD

## 2025-08-26 RX ORDER — VALACYCLOVIR HYDROCHLORIDE 500 MG/1
500 TABLET, FILM COATED ORAL
Qty: 90 TABLET | Refills: 0 | OUTPATIENT
Start: 2025-08-26

## 2025-08-29 ENCOUNTER — PATIENT MESSAGE (OUTPATIENT)
Dept: FAMILY MEDICINE | Facility: CLINIC | Age: 45
End: 2025-08-29
Payer: COMMERCIAL

## 2025-08-29 RX ORDER — VALACYCLOVIR HYDROCHLORIDE 500 MG/1
500 TABLET, FILM COATED ORAL DAILY
Qty: 30 TABLET | Refills: 2 | Status: SHIPPED | OUTPATIENT
Start: 2025-08-29

## 2025-08-29 RX ORDER — VALACYCLOVIR HYDROCHLORIDE 500 MG/1
500 TABLET, FILM COATED ORAL
Qty: 30 TABLET | Refills: 0 | OUTPATIENT
Start: 2025-08-29

## (undated) DEVICE — SEE MEDLINE ITEM 157027

## (undated) DEVICE — SUT VICRYL 4-0 ANTIBACT

## (undated) DEVICE — GLOVE SURGICAL LATEX SZ 7

## (undated) DEVICE — SUT VICRYL ANTIMICRO VIL BR

## (undated) DEVICE — SPONGE LAP 18X18 PREWASHED

## (undated) DEVICE — ADHESIVE DERMABOND ADVANCED

## (undated) DEVICE — GAUZE SPONGE 4X4 12PLY

## (undated) DEVICE — Device

## (undated) DEVICE — GLOVE SURG BIOGEL LATEX SZ 7.5

## (undated) DEVICE — SUT VICRYL 2-0 27 CT-1

## (undated) DEVICE — DRESSING AQUACEL AG 3.5X10IN

## (undated) DEVICE — DRAPE LAP TIBURON 77X122IN

## (undated) DEVICE — SEE MEDLINE ITEM 157125

## (undated) DEVICE — TRAY VAGINAL WET PREP

## (undated) DEVICE — SEE MEDLINE ITEM 152622

## (undated) DEVICE — SEE MEDLINE ITEM 157181

## (undated) DEVICE — CONTAINER SPECIMEN STRL 4OZ

## (undated) DEVICE — SUT CHROMIC 3-0 SH 27IN GUT

## (undated) DEVICE — SEE MEDLINE ITEM 157117

## (undated) DEVICE — ELECTRODE REM PLYHSV RETURN 9

## (undated) DEVICE — SUT VICRYL BR 1 GEN 27 CT-1

## (undated) DEVICE — MANIFOLD 4 PORT

## (undated) DEVICE — DRAPE LAVH LAPAROSCOPY W/FLUID

## (undated) DEVICE — SEE MEDLINE ITEM 152739

## (undated) DEVICE — ELECTRODE BLD EXT 6.50 ST DISP

## (undated) DEVICE — COVER OVERHEAD SURG LT BLUE